# Patient Record
Sex: FEMALE | Race: WHITE | NOT HISPANIC OR LATINO | Employment: OTHER | URBAN - METROPOLITAN AREA
[De-identification: names, ages, dates, MRNs, and addresses within clinical notes are randomized per-mention and may not be internally consistent; named-entity substitution may affect disease eponyms.]

---

## 2017-01-01 ENCOUNTER — APPOINTMENT (OUTPATIENT)
Dept: GENERAL RADIOLOGY | Facility: HOSPITAL | Age: 62
End: 2017-01-01
Attending: INTERNAL MEDICINE

## 2017-01-01 ENCOUNTER — HOSPITAL ENCOUNTER (INPATIENT)
Facility: HOSPITAL | Age: 62
LOS: 14 days | Discharge: HOME HEALTH | End: 2017-04-20
Attending: EMERGENCY MEDICINE | Admitting: INTERNAL MEDICINE

## 2017-01-01 ENCOUNTER — TELEPHONE (OUTPATIENT)
Dept: FAMILY MEDICINE CLINIC | Facility: CLINIC | Age: 62
End: 2017-01-01

## 2017-01-01 ENCOUNTER — APPOINTMENT (OUTPATIENT)
Dept: CT IMAGING | Facility: HOSPITAL | Age: 62
End: 2017-01-01

## 2017-01-01 ENCOUNTER — OFFICE VISIT (OUTPATIENT)
Dept: FAMILY MEDICINE CLINIC | Facility: CLINIC | Age: 62
End: 2017-01-01

## 2017-01-01 ENCOUNTER — HOSPITAL ENCOUNTER (INPATIENT)
Facility: HOSPITAL | Age: 62
LOS: 1 days | End: 2017-08-17
Attending: INTERNAL MEDICINE | Admitting: INTERNAL MEDICINE

## 2017-01-01 ENCOUNTER — HOSPITAL ENCOUNTER (INPATIENT)
Facility: HOSPITAL | Age: 62
LOS: 5 days | Discharge: HOSPICE | End: 2017-08-16
Attending: EMERGENCY MEDICINE | Admitting: HOSPITALIST

## 2017-01-01 ENCOUNTER — APPOINTMENT (OUTPATIENT)
Dept: GENERAL RADIOLOGY | Facility: HOSPITAL | Age: 62
End: 2017-01-01

## 2017-01-01 ENCOUNTER — APPOINTMENT (OUTPATIENT)
Dept: CARDIOLOGY | Facility: HOSPITAL | Age: 62
End: 2017-01-01

## 2017-01-01 VITALS
SYSTOLIC BLOOD PRESSURE: 104 MMHG | RESPIRATION RATE: 16 BRPM | DIASTOLIC BLOOD PRESSURE: 72 MMHG | HEART RATE: 94 BPM | HEIGHT: 66 IN | TEMPERATURE: 100.9 F | OXYGEN SATURATION: 92 % | BODY MASS INDEX: 26.65 KG/M2 | WEIGHT: 165.8 LBS

## 2017-01-01 VITALS
WEIGHT: 205 LBS | DIASTOLIC BLOOD PRESSURE: 86 MMHG | HEART RATE: 111 BPM | OXYGEN SATURATION: 96 % | HEIGHT: 60 IN | SYSTOLIC BLOOD PRESSURE: 128 MMHG | BODY MASS INDEX: 40.25 KG/M2

## 2017-01-01 VITALS
SYSTOLIC BLOOD PRESSURE: 78 MMHG | RESPIRATION RATE: 28 BRPM | HEART RATE: 154 BPM | TEMPERATURE: 105.4 F | DIASTOLIC BLOOD PRESSURE: 50 MMHG | OXYGEN SATURATION: 67 %

## 2017-01-01 VITALS
RESPIRATION RATE: 16 BRPM | WEIGHT: 201.7 LBS | HEIGHT: 60 IN | SYSTOLIC BLOOD PRESSURE: 106 MMHG | HEART RATE: 79 BPM | OXYGEN SATURATION: 92 % | TEMPERATURE: 98 F | DIASTOLIC BLOOD PRESSURE: 68 MMHG | BODY MASS INDEX: 39.6 KG/M2

## 2017-01-01 VITALS
SYSTOLIC BLOOD PRESSURE: 124 MMHG | HEART RATE: 103 BPM | HEIGHT: 63 IN | WEIGHT: 227 LBS | BODY MASS INDEX: 40.22 KG/M2 | TEMPERATURE: 98.9 F | OXYGEN SATURATION: 94 % | DIASTOLIC BLOOD PRESSURE: 90 MMHG

## 2017-01-01 DIAGNOSIS — J96.01 ACUTE RESPIRATORY FAILURE WITH HYPOXIA (HCC): Primary | ICD-10-CM

## 2017-01-01 DIAGNOSIS — J96.21 ACUTE ON CHRONIC RESPIRATORY FAILURE WITH HYPOXIA (HCC): ICD-10-CM

## 2017-01-01 DIAGNOSIS — R41.82 ALTERED MENTAL STATUS, UNSPECIFIED ALTERED MENTAL STATUS TYPE: Primary | ICD-10-CM

## 2017-01-01 DIAGNOSIS — J18.8 OTHER PNEUMONIA, UNSPECIFIED ORGANISM: ICD-10-CM

## 2017-01-01 DIAGNOSIS — J44.1 COPD EXACERBATION (HCC): ICD-10-CM

## 2017-01-01 DIAGNOSIS — N39.0 URINARY TRACT INFECTION WITHOUT HEMATURIA, SITE UNSPECIFIED: ICD-10-CM

## 2017-01-01 DIAGNOSIS — G93.41 METABOLIC ENCEPHALOPATHY: ICD-10-CM

## 2017-01-01 DIAGNOSIS — J43.9 PULMONARY EMPHYSEMA, UNSPECIFIED EMPHYSEMA TYPE (HCC): ICD-10-CM

## 2017-01-01 DIAGNOSIS — F17.200 SMOKING: ICD-10-CM

## 2017-01-01 DIAGNOSIS — R09.02 HYPOXIA: ICD-10-CM

## 2017-01-01 DIAGNOSIS — R09.02 HYPOXEMIA: ICD-10-CM

## 2017-01-01 DIAGNOSIS — A41.9 SEPSIS, DUE TO UNSPECIFIED ORGANISM: Primary | ICD-10-CM

## 2017-01-01 DIAGNOSIS — R13.10 SWALLOWING IMPAIRED: ICD-10-CM

## 2017-01-01 DIAGNOSIS — A41.9 SEPSIS, DUE TO UNSPECIFIED ORGANISM: ICD-10-CM

## 2017-01-01 DIAGNOSIS — R13.10 DYSPHAGIA, UNSPECIFIED TYPE: ICD-10-CM

## 2017-01-01 DIAGNOSIS — R50.9 FEVER, UNSPECIFIED FEVER CAUSE: ICD-10-CM

## 2017-01-01 DIAGNOSIS — C79.9 METASTATIC DISEASE (HCC): ICD-10-CM

## 2017-01-01 DIAGNOSIS — L30.9 DERMATITIS: Primary | ICD-10-CM

## 2017-01-01 LAB
ALBUMIN SERPL-MCNC: 2 G/DL (ref 2.9–4.4)
ALBUMIN SERPL-MCNC: 2.9 G/DL (ref 3.5–5.2)
ALBUMIN SERPL-MCNC: 3.2 G/DL (ref 3.5–5.2)
ALBUMIN SERPL-MCNC: 3.6 G/DL (ref 3.5–5.2)
ALBUMIN/GLOB SERPL: 0.5 {RATIO} (ref 0.7–1.7)
ALBUMIN/GLOB SERPL: 0.6 G/DL
ALBUMIN/GLOB SERPL: 0.9 G/DL
ALBUMIN/GLOB SERPL: 0.9 G/DL
ALP SERPL-CCNC: 109 U/L (ref 39–117)
ALP SERPL-CCNC: 131 U/L (ref 39–117)
ALP SERPL-CCNC: 198 U/L (ref 39–117)
ALPHA1 GLOB FLD ELPH-MCNC: 0.3 G/DL (ref 0–0.4)
ALPHA2 GLOB SERPL ELPH-MCNC: 1.2 G/DL (ref 0.4–1)
ALT SERPL W P-5'-P-CCNC: 17 U/L (ref 1–33)
ALT SERPL W P-5'-P-CCNC: 25 U/L (ref 1–33)
ALT SERPL W P-5'-P-CCNC: 5 U/L (ref 1–33)
AMMONIA BLD-SCNC: 32 UMOL/L (ref 11–51)
AMPHET+METHAMPHET UR QL: NEGATIVE
ANION GAP SERPL CALCULATED.3IONS-SCNC: 10.7 MMOL/L
ANION GAP SERPL CALCULATED.3IONS-SCNC: 11.5 MMOL/L
ANION GAP SERPL CALCULATED.3IONS-SCNC: 11.6 MMOL/L
ANION GAP SERPL CALCULATED.3IONS-SCNC: 12.5 MMOL/L
ANION GAP SERPL CALCULATED.3IONS-SCNC: 14.1 MMOL/L
ANION GAP SERPL CALCULATED.3IONS-SCNC: 15.1 MMOL/L
ANION GAP SERPL CALCULATED.3IONS-SCNC: 16.1 MMOL/L
ANISOCYTOSIS BLD QL: NORMAL
ARTERIAL PATENCY WRIST A: ABNORMAL
ARTERIAL PATENCY WRIST A: POSITIVE
AST SERPL-CCNC: 18 U/L (ref 1–32)
AST SERPL-CCNC: 27 U/L (ref 1–32)
AST SERPL-CCNC: 45 U/L (ref 1–32)
ATMOSPHERIC PRESS: 745.5 MMHG
ATMOSPHERIC PRESS: 746.4 MMHG
ATMOSPHERIC PRESS: 749.3 MMHG
ATMOSPHERIC PRESS: 751.3 MMHG
ATMOSPHERIC PRESS: 751.8 MMHG
B-GLOBULIN SERPL ELPH-MCNC: 1.2 G/DL (ref 0.7–1.3)
BACTERIA SPEC AEROBE CULT: ABNORMAL
BACTERIA SPEC AEROBE CULT: NO GROWTH
BACTERIA SPEC AEROBE CULT: NORMAL
BACTERIA UR QL AUTO: ABNORMAL /HPF
BACTERIA UR QL AUTO: ABNORMAL /HPF
BARBITURATES UR QL SCN: NEGATIVE
BASE EXCESS BLDA CALC-SCNC: 10.2 MMOL/L (ref 0–2)
BASE EXCESS BLDA CALC-SCNC: 2.4 MMOL/L (ref 0–2)
BASE EXCESS BLDA CALC-SCNC: 6.1 MMOL/L (ref 0–2)
BASE EXCESS BLDA CALC-SCNC: 7.2 MMOL/L (ref 0–2)
BASE EXCESS BLDA CALC-SCNC: 7.6 MMOL/L (ref 0–2)
BASOPHILS # BLD AUTO: 0 10*3/MM3 (ref 0–0.2)
BASOPHILS # BLD AUTO: 0.01 10*3/MM3 (ref 0–0.2)
BASOPHILS # BLD AUTO: 0.02 10*3/MM3 (ref 0–0.2)
BASOPHILS # BLD AUTO: 0.05 10*3/MM3 (ref 0–0.2)
BASOPHILS # BLD AUTO: 0.09 10*3/MM3 (ref 0–0.2)
BASOPHILS NFR BLD AUTO: 0 % (ref 0–1.5)
BASOPHILS NFR BLD AUTO: 0.1 % (ref 0–1.5)
BASOPHILS NFR BLD AUTO: 0.3 % (ref 0–1.5)
BASOPHILS NFR BLD AUTO: 0.5 % (ref 0–1.5)
BASOPHILS NFR BLD AUTO: 0.9 % (ref 0–1.5)
BDY SITE: ABNORMAL
BENZODIAZ UR QL SCN: NEGATIVE
BH CV LOWER VASCULAR LEFT COMMON FEMORAL AUGMENT: NORMAL
BH CV LOWER VASCULAR LEFT COMMON FEMORAL COMPETENT: NORMAL
BH CV LOWER VASCULAR LEFT COMMON FEMORAL COMPRESS: NORMAL
BH CV LOWER VASCULAR LEFT COMMON FEMORAL PHASIC: NORMAL
BH CV LOWER VASCULAR LEFT COMMON FEMORAL SPONT: NORMAL
BH CV LOWER VASCULAR LEFT DISTAL FEMORAL COMPRESS: NORMAL
BH CV LOWER VASCULAR LEFT GASTRONEMIUS COMPRESS: NORMAL
BH CV LOWER VASCULAR LEFT GREATER SAPH AK COMPRESS: NORMAL
BH CV LOWER VASCULAR LEFT GREATER SAPH BK COMPRESS: NORMAL
BH CV LOWER VASCULAR LEFT MID FEMORAL AUGMENT: NORMAL
BH CV LOWER VASCULAR LEFT MID FEMORAL COMPETENT: NORMAL
BH CV LOWER VASCULAR LEFT MID FEMORAL COMPRESS: NORMAL
BH CV LOWER VASCULAR LEFT MID FEMORAL PHASIC: NORMAL
BH CV LOWER VASCULAR LEFT MID FEMORAL SPONT: NORMAL
BH CV LOWER VASCULAR LEFT PERONEAL COMPRESS: NORMAL
BH CV LOWER VASCULAR LEFT POPLITEAL AUGMENT: NORMAL
BH CV LOWER VASCULAR LEFT POPLITEAL COMPETENT: NORMAL
BH CV LOWER VASCULAR LEFT POPLITEAL COMPRESS: NORMAL
BH CV LOWER VASCULAR LEFT POPLITEAL PHASIC: NORMAL
BH CV LOWER VASCULAR LEFT POPLITEAL SPONT: NORMAL
BH CV LOWER VASCULAR LEFT POSTERIOR TIBIAL COMPRESS: NORMAL
BH CV LOWER VASCULAR LEFT PROXIMAL FEMORAL COMPRESS: NORMAL
BH CV LOWER VASCULAR LEFT SAPHENOFEMORAL JUNCTION AUGMENT: NORMAL
BH CV LOWER VASCULAR LEFT SAPHENOFEMORAL JUNCTION COMPETENT: NORMAL
BH CV LOWER VASCULAR LEFT SAPHENOFEMORAL JUNCTION COMPRESS: NORMAL
BH CV LOWER VASCULAR LEFT SAPHENOFEMORAL JUNCTION PHASIC: NORMAL
BH CV LOWER VASCULAR LEFT SAPHENOFEMORAL JUNCTION SPONT: NORMAL
BH CV LOWER VASCULAR RIGHT COMMON FEMORAL AUGMENT: NORMAL
BH CV LOWER VASCULAR RIGHT COMMON FEMORAL COMPETENT: NORMAL
BH CV LOWER VASCULAR RIGHT COMMON FEMORAL COMPRESS: NORMAL
BH CV LOWER VASCULAR RIGHT COMMON FEMORAL PHASIC: NORMAL
BH CV LOWER VASCULAR RIGHT COMMON FEMORAL SPONT: NORMAL
BH CV LOWER VASCULAR RIGHT DISTAL FEMORAL COMPRESS: NORMAL
BH CV LOWER VASCULAR RIGHT GASTRONEMIUS COMPRESS: NORMAL
BH CV LOWER VASCULAR RIGHT GREATER SAPH AK COMPRESS: NORMAL
BH CV LOWER VASCULAR RIGHT GREATER SAPH BK COMPRESS: NORMAL
BH CV LOWER VASCULAR RIGHT MID FEMORAL AUGMENT: NORMAL
BH CV LOWER VASCULAR RIGHT MID FEMORAL COMPETENT: NORMAL
BH CV LOWER VASCULAR RIGHT MID FEMORAL COMPRESS: NORMAL
BH CV LOWER VASCULAR RIGHT MID FEMORAL PHASIC: NORMAL
BH CV LOWER VASCULAR RIGHT MID FEMORAL SPONT: NORMAL
BH CV LOWER VASCULAR RIGHT PERONEAL COMPRESS: NORMAL
BH CV LOWER VASCULAR RIGHT POPLITEAL AUGMENT: NORMAL
BH CV LOWER VASCULAR RIGHT POPLITEAL COMPETENT: NORMAL
BH CV LOWER VASCULAR RIGHT POPLITEAL COMPRESS: NORMAL
BH CV LOWER VASCULAR RIGHT POPLITEAL PHASIC: NORMAL
BH CV LOWER VASCULAR RIGHT POPLITEAL SPONT: NORMAL
BH CV LOWER VASCULAR RIGHT POSTERIOR TIBIAL COMPRESS: NORMAL
BH CV LOWER VASCULAR RIGHT PROXIMAL FEMORAL COMPRESS: NORMAL
BH CV LOWER VASCULAR RIGHT SAPHENOFEMORAL JUNCTION AUGMENT: NORMAL
BH CV LOWER VASCULAR RIGHT SAPHENOFEMORAL JUNCTION COMPETENT: NORMAL
BH CV LOWER VASCULAR RIGHT SAPHENOFEMORAL JUNCTION COMPRESS: NORMAL
BH CV LOWER VASCULAR RIGHT SAPHENOFEMORAL JUNCTION PHASIC: NORMAL
BH CV LOWER VASCULAR RIGHT SAPHENOFEMORAL JUNCTION SPONT: NORMAL
BILIRUB SERPL-MCNC: 0.2 MG/DL (ref 0.1–1.2)
BILIRUB SERPL-MCNC: 0.3 MG/DL (ref 0.1–1.2)
BILIRUB SERPL-MCNC: 0.4 MG/DL (ref 0.1–1.2)
BILIRUB UR QL STRIP: NEGATIVE
BUN BLD-MCNC: 10 MG/DL (ref 6–20)
BUN BLD-MCNC: 12 MG/DL (ref 6–20)
BUN BLD-MCNC: 12 MG/DL (ref 6–20)
BUN BLD-MCNC: 21 MG/DL (ref 8–23)
BUN BLD-MCNC: 26 MG/DL (ref 8–23)
BUN BLD-MCNC: 27 MG/DL (ref 8–23)
BUN BLD-MCNC: 9 MG/DL (ref 6–20)
BUN/CREAT SERPL: 13 (ref 7–25)
BUN/CREAT SERPL: 16.7 (ref 7–25)
BUN/CREAT SERPL: 18 (ref 7–25)
BUN/CREAT SERPL: 24 (ref 7–25)
BUN/CREAT SERPL: 28.8 (ref 7–25)
BUN/CREAT SERPL: 42.9 (ref 7–25)
BUN/CREAT SERPL: 53.1 (ref 7–25)
CALCIUM SPEC-SCNC: 7.8 MG/DL (ref 8.6–10.5)
CALCIUM SPEC-SCNC: 8.6 MG/DL (ref 8.6–10.5)
CALCIUM SPEC-SCNC: 8.7 MG/DL (ref 8.6–10.5)
CALCIUM SPEC-SCNC: 8.8 MG/DL (ref 8.6–10.5)
CALCIUM SPEC-SCNC: 8.9 MG/DL (ref 8.6–10.5)
CALCIUM SPEC-SCNC: 9 MG/DL (ref 8.6–10.5)
CALCIUM SPEC-SCNC: 9.3 MG/DL (ref 8.6–10.5)
CANNABINOIDS SERPL QL: NEGATIVE
CHLORIDE SERPL-SCNC: 100 MMOL/L (ref 98–107)
CHLORIDE SERPL-SCNC: 102 MMOL/L (ref 98–107)
CHLORIDE SERPL-SCNC: 109 MMOL/L (ref 98–107)
CHLORIDE SERPL-SCNC: 96 MMOL/L (ref 98–107)
CHLORIDE SERPL-SCNC: 98 MMOL/L (ref 98–107)
CLARITY UR: ABNORMAL
CLARITY UR: CLEAR
CO2 SERPL-SCNC: 25.5 MMOL/L (ref 22–29)
CO2 SERPL-SCNC: 26.9 MMOL/L (ref 22–29)
CO2 SERPL-SCNC: 28.9 MMOL/L (ref 22–29)
CO2 SERPL-SCNC: 29.9 MMOL/L (ref 22–29)
CO2 SERPL-SCNC: 30.4 MMOL/L (ref 22–29)
CO2 SERPL-SCNC: 31.3 MMOL/L (ref 22–29)
CO2 SERPL-SCNC: 31.5 MMOL/L (ref 22–29)
COCAINE UR QL: NEGATIVE
COLOR UR: ABNORMAL
CREAT BLD-MCNC: 0.49 MG/DL (ref 0.57–1)
CREAT BLD-MCNC: 0.5 MG/DL (ref 0.57–1)
CREAT BLD-MCNC: 0.5 MG/DL (ref 0.57–1)
CREAT BLD-MCNC: 0.63 MG/DL (ref 0.57–1)
CREAT BLD-MCNC: 0.72 MG/DL (ref 0.57–1)
CREAT BLD-MCNC: 0.73 MG/DL (ref 0.57–1)
CREAT BLD-MCNC: 0.77 MG/DL (ref 0.57–1)
D DIMER PPP FEU-MCNC: 3.45 MCGFEU/ML (ref 0–0.49)
D-LACTATE SERPL-SCNC: 0.7 MMOL/L (ref 0.5–2)
D-LACTATE SERPL-SCNC: 1 MMOL/L (ref 0.5–2)
D-LACTATE SERPL-SCNC: 1.6 MMOL/L (ref 0.5–2)
DEPRECATED RDW RBC AUTO: 48.9 FL (ref 37–54)
DEPRECATED RDW RBC AUTO: 53.3 FL (ref 37–54)
DEPRECATED RDW RBC AUTO: 53.7 FL (ref 37–54)
DEPRECATED RDW RBC AUTO: 55.5 FL (ref 37–54)
DEPRECATED RDW RBC AUTO: 58.3 FL (ref 37–54)
DEPRECATED RDW RBC AUTO: 65.3 FL (ref 37–54)
DEPRECATED RDW RBC AUTO: 66.1 FL (ref 37–54)
DEPRECATED RDW RBC AUTO: 67.7 FL (ref 37–54)
EOSINOPHIL # BLD AUTO: 0 10*3/MM3 (ref 0–0.7)
EOSINOPHIL # BLD AUTO: 0.01 10*3/MM3 (ref 0–0.7)
EOSINOPHIL # BLD AUTO: 0.02 10*3/MM3 (ref 0–0.7)
EOSINOPHIL # BLD AUTO: 0.15 10*3/MM3 (ref 0–0.7)
EOSINOPHIL # BLD AUTO: 0.31 10*3/MM3 (ref 0–0.7)
EOSINOPHIL # BLD MANUAL: 0.1 10*3/MM3 (ref 0–0.7)
EOSINOPHIL NFR BLD AUTO: 0 % (ref 0.3–6.2)
EOSINOPHIL NFR BLD AUTO: 0.1 % (ref 0.3–6.2)
EOSINOPHIL NFR BLD AUTO: 0.3 % (ref 0.3–6.2)
EOSINOPHIL NFR BLD AUTO: 1.4 % (ref 0.3–6.2)
EOSINOPHIL NFR BLD AUTO: 3 % (ref 0.3–6.2)
EOSINOPHIL NFR BLD MANUAL: 1 % (ref 0.3–6.2)
ERYTHROCYTE [DISTWIDTH] IN BLOOD BY AUTOMATED COUNT: 15.9 % (ref 11.7–13)
ERYTHROCYTE [DISTWIDTH] IN BLOOD BY AUTOMATED COUNT: 16.8 % (ref 11.7–13)
ERYTHROCYTE [DISTWIDTH] IN BLOOD BY AUTOMATED COUNT: 17 % (ref 11.7–13)
ERYTHROCYTE [DISTWIDTH] IN BLOOD BY AUTOMATED COUNT: 17.6 % (ref 11.7–13)
ERYTHROCYTE [DISTWIDTH] IN BLOOD BY AUTOMATED COUNT: 18.1 % (ref 11.7–13)
ERYTHROCYTE [DISTWIDTH] IN BLOOD BY AUTOMATED COUNT: 19.9 % (ref 11.7–13)
ERYTHROCYTE [DISTWIDTH] IN BLOOD BY AUTOMATED COUNT: 20.2 % (ref 11.7–13)
ERYTHROCYTE [DISTWIDTH] IN BLOOD BY AUTOMATED COUNT: 20.2 % (ref 11.7–13)
FERRITIN SERPL-MCNC: 402.9 NG/ML (ref 13–150)
FLUAV AG NPH QL: NEGATIVE
FLUBV AG NPH QL IA: POSITIVE
GAMMA GLOB SERPL ELPH-MCNC: 1.7 G/DL (ref 0.4–1.8)
GAS FLOW AIRWAY: 15 LPM
GAS FLOW AIRWAY: 2 LPM
GAS FLOW AIRWAY: 3.5 LPM
GAS FLOW AIRWAY: 7 LPM
GFR SERPL CREATININE-BSD FRML MDRD: 126 ML/MIN/1.73
GFR SERPL CREATININE-BSD FRML MDRD: 126 ML/MIN/1.73
GFR SERPL CREATININE-BSD FRML MDRD: 129 ML/MIN/1.73
GFR SERPL CREATININE-BSD FRML MDRD: 77 ML/MIN/1.73
GFR SERPL CREATININE-BSD FRML MDRD: 81 ML/MIN/1.73
GFR SERPL CREATININE-BSD FRML MDRD: 83 ML/MIN/1.73
GFR SERPL CREATININE-BSD FRML MDRD: 96 ML/MIN/1.73
GLOBULIN SER CALC-MCNC: 4.4 G/DL (ref 2.2–3.9)
GLOBULIN UR ELPH-MCNC: 3.5 GM/DL
GLOBULIN UR ELPH-MCNC: 4 GM/DL
GLOBULIN UR ELPH-MCNC: 5 GM/DL
GLUCOSE BLD-MCNC: 116 MG/DL (ref 65–99)
GLUCOSE BLD-MCNC: 117 MG/DL (ref 65–99)
GLUCOSE BLD-MCNC: 140 MG/DL (ref 65–99)
GLUCOSE BLD-MCNC: 85 MG/DL (ref 65–99)
GLUCOSE BLD-MCNC: 87 MG/DL (ref 65–99)
GLUCOSE BLD-MCNC: 97 MG/DL (ref 65–99)
GLUCOSE BLD-MCNC: 98 MG/DL (ref 65–99)
GLUCOSE BLDC GLUCOMTR-MCNC: 100 MG/DL (ref 70–130)
GLUCOSE BLDC GLUCOMTR-MCNC: 101 MG/DL (ref 70–130)
GLUCOSE BLDC GLUCOMTR-MCNC: 105 MG/DL (ref 70–130)
GLUCOSE BLDC GLUCOMTR-MCNC: 107 MG/DL (ref 70–130)
GLUCOSE BLDC GLUCOMTR-MCNC: 109 MG/DL (ref 70–130)
GLUCOSE BLDC GLUCOMTR-MCNC: 110 MG/DL (ref 70–130)
GLUCOSE BLDC GLUCOMTR-MCNC: 115 MG/DL (ref 70–130)
GLUCOSE BLDC GLUCOMTR-MCNC: 116 MG/DL (ref 70–130)
GLUCOSE BLDC GLUCOMTR-MCNC: 120 MG/DL (ref 70–130)
GLUCOSE BLDC GLUCOMTR-MCNC: 123 MG/DL (ref 70–130)
GLUCOSE BLDC GLUCOMTR-MCNC: 79 MG/DL (ref 70–130)
GLUCOSE BLDC GLUCOMTR-MCNC: 84 MG/DL (ref 70–130)
GLUCOSE BLDC GLUCOMTR-MCNC: 85 MG/DL (ref 70–130)
GLUCOSE BLDC GLUCOMTR-MCNC: 87 MG/DL (ref 70–130)
GLUCOSE BLDC GLUCOMTR-MCNC: 89 MG/DL (ref 70–130)
GLUCOSE BLDC GLUCOMTR-MCNC: 90 MG/DL (ref 70–130)
GLUCOSE BLDC GLUCOMTR-MCNC: 92 MG/DL (ref 70–130)
GLUCOSE BLDC GLUCOMTR-MCNC: 92 MG/DL (ref 70–130)
GLUCOSE BLDC GLUCOMTR-MCNC: 93 MG/DL (ref 70–130)
GLUCOSE BLDC GLUCOMTR-MCNC: 94 MG/DL (ref 70–130)
GLUCOSE BLDC GLUCOMTR-MCNC: 95 MG/DL (ref 70–130)
GLUCOSE BLDC GLUCOMTR-MCNC: 95 MG/DL (ref 70–130)
GLUCOSE UR STRIP-MCNC: NEGATIVE MG/DL
HCO3 BLDA-SCNC: 27.4 MMOL/L (ref 22–28)
HCO3 BLDA-SCNC: 30.1 MMOL/L (ref 22–28)
HCO3 BLDA-SCNC: 32.3 MMOL/L (ref 22–28)
HCO3 BLDA-SCNC: 32.9 MMOL/L (ref 22–28)
HCO3 BLDA-SCNC: 35.3 MMOL/L (ref 22–28)
HCT VFR BLD AUTO: 26.8 % (ref 35.6–45.5)
HCT VFR BLD AUTO: 27.4 % (ref 35.6–45.5)
HCT VFR BLD AUTO: 29.7 % (ref 35.6–45.5)
HCT VFR BLD AUTO: 32.8 % (ref 35.6–45.5)
HCT VFR BLD AUTO: 33.3 % (ref 35.6–45.5)
HCT VFR BLD AUTO: 33.5 % (ref 35.6–45.5)
HCT VFR BLD AUTO: 34.4 % (ref 35.6–45.5)
HCT VFR BLD AUTO: 36.6 % (ref 35.6–45.5)
HGB BLD-MCNC: 10.3 G/DL (ref 11.9–15.5)
HGB BLD-MCNC: 10.4 G/DL (ref 11.9–15.5)
HGB BLD-MCNC: 10.7 G/DL (ref 11.9–15.5)
HGB BLD-MCNC: 10.9 G/DL (ref 11.9–15.5)
HGB BLD-MCNC: 11.5 G/DL (ref 11.9–15.5)
HGB BLD-MCNC: 7.5 G/DL (ref 11.9–15.5)
HGB BLD-MCNC: 7.6 G/DL (ref 11.9–15.5)
HGB BLD-MCNC: 8.9 G/DL (ref 11.9–15.5)
HGB UR QL STRIP.AUTO: NEGATIVE
HOLD SPECIMEN: NORMAL
HOLD SPECIMEN: NORMAL
HYALINE CASTS UR QL AUTO: ABNORMAL /LPF
HYALINE CASTS UR QL AUTO: ABNORMAL /LPF
HYPOCHROMIA BLD QL: ABNORMAL
HYPOCHROMIA BLD QL: NORMAL
IMM GRANULOCYTES # BLD: 0 10*3/MM3 (ref 0–0.03)
IMM GRANULOCYTES # BLD: 0.03 10*3/MM3 (ref 0–0.03)
IMM GRANULOCYTES # BLD: 0.07 10*3/MM3 (ref 0–0.03)
IMM GRANULOCYTES # BLD: 0.24 10*3/MM3 (ref 0–0.03)
IMM GRANULOCYTES # BLD: 0.3 10*3/MM3 (ref 0–0.03)
IMM GRANULOCYTES NFR BLD: 0 % (ref 0–0.5)
IMM GRANULOCYTES NFR BLD: 0.3 % (ref 0–0.5)
IMM GRANULOCYTES NFR BLD: 1.1 % (ref 0–0.5)
IMM GRANULOCYTES NFR BLD: 2.2 % (ref 0–0.5)
IMM GRANULOCYTES NFR BLD: 2.9 % (ref 0–0.5)
INR PPP: 1.1 (ref 0.9–1.1)
IRON 24H UR-MRATE: 27 MCG/DL (ref 37–145)
IRON SATN MFR SERPL: 10 % (ref 20–50)
KETONES UR QL STRIP: ABNORMAL
KETONES UR QL STRIP: ABNORMAL
KETONES UR QL STRIP: NEGATIVE
KETONES UR QL STRIP: NEGATIVE
LEUKOCYTE ESTERASE UR QL STRIP.AUTO: ABNORMAL
LEUKOCYTE ESTERASE UR QL STRIP.AUTO: ABNORMAL
LEUKOCYTE ESTERASE UR QL STRIP.AUTO: NEGATIVE
LEUKOCYTE ESTERASE UR QL STRIP.AUTO: NEGATIVE
LYMPHOCYTES # BLD AUTO: 1.19 10*3/MM3 (ref 0.9–4.8)
LYMPHOCYTES # BLD AUTO: 1.24 10*3/MM3 (ref 0.9–4.8)
LYMPHOCYTES # BLD AUTO: 1.9 10*3/MM3 (ref 0.9–4.8)
LYMPHOCYTES # BLD AUTO: 2.77 10*3/MM3 (ref 0.9–4.8)
LYMPHOCYTES # BLD AUTO: 2.77 10*3/MM3 (ref 0.9–4.8)
LYMPHOCYTES # BLD MANUAL: 2.08 10*3/MM3 (ref 0.9–4.8)
LYMPHOCYTES NFR BLD AUTO: 14 % (ref 19.6–45.3)
LYMPHOCYTES NFR BLD AUTO: 17.8 % (ref 19.6–45.3)
LYMPHOCYTES NFR BLD AUTO: 25.4 % (ref 19.6–45.3)
LYMPHOCYTES NFR BLD AUTO: 26.8 % (ref 19.6–45.3)
LYMPHOCYTES NFR BLD AUTO: 30.8 % (ref 19.6–45.3)
LYMPHOCYTES NFR BLD MANUAL: 21 % (ref 19.6–45.3)
LYMPHOCYTES NFR BLD MANUAL: 8 % (ref 5–12)
Lab: ABNORMAL
M-SPIKE: ABNORMAL G/DL
MCH RBC QN AUTO: 25.5 PG (ref 26.9–32)
MCH RBC QN AUTO: 25.8 PG (ref 26.9–32)
MCH RBC QN AUTO: 27.1 PG (ref 26.9–32)
MCH RBC QN AUTO: 27.1 PG (ref 26.9–32)
MCH RBC QN AUTO: 27.2 PG (ref 26.9–32)
MCH RBC QN AUTO: 27.4 PG (ref 26.9–32)
MCH RBC QN AUTO: 27.4 PG (ref 26.9–32)
MCH RBC QN AUTO: 27.7 PG (ref 26.9–32)
MCHC RBC AUTO-ENTMCNC: 27.7 G/DL (ref 32.4–36.3)
MCHC RBC AUTO-ENTMCNC: 28 G/DL (ref 32.4–36.3)
MCHC RBC AUTO-ENTMCNC: 30 G/DL (ref 32.4–36.3)
MCHC RBC AUTO-ENTMCNC: 30.7 G/DL (ref 32.4–36.3)
MCHC RBC AUTO-ENTMCNC: 31.2 G/DL (ref 32.4–36.3)
MCHC RBC AUTO-ENTMCNC: 31.4 G/DL (ref 32.4–36.3)
MCHC RBC AUTO-ENTMCNC: 31.7 G/DL (ref 32.4–36.3)
MCHC RBC AUTO-ENTMCNC: 32.6 G/DL (ref 32.4–36.3)
MCV RBC AUTO: 85 FL (ref 80.5–98.2)
MCV RBC AUTO: 86.4 FL (ref 80.5–98.2)
MCV RBC AUTO: 86.7 FL (ref 80.5–98.2)
MCV RBC AUTO: 87.1 FL (ref 80.5–98.2)
MCV RBC AUTO: 88.4 FL (ref 80.5–98.2)
MCV RBC AUTO: 90.3 FL (ref 80.5–98.2)
MCV RBC AUTO: 91.2 FL (ref 80.5–98.2)
MCV RBC AUTO: 92.9 FL (ref 80.5–98.2)
METHADONE UR QL SCN: NEGATIVE
MODALITY: ABNORMAL
MONOCYTES # BLD AUTO: 0.23 10*3/MM3 (ref 0.2–1.2)
MONOCYTES # BLD AUTO: 0.35 10*3/MM3 (ref 0.2–1.2)
MONOCYTES # BLD AUTO: 0.72 10*3/MM3 (ref 0.2–1.2)
MONOCYTES # BLD AUTO: 0.79 10*3/MM3 (ref 0.2–1.2)
MONOCYTES # BLD AUTO: 1.67 10*3/MM3 (ref 0.2–1.2)
MONOCYTES # BLD AUTO: 1.7 10*3/MM3 (ref 0.2–1.2)
MONOCYTES NFR BLD AUTO: 11.7 % (ref 5–12)
MONOCYTES NFR BLD AUTO: 15.3 % (ref 5–12)
MONOCYTES NFR BLD AUTO: 16.5 % (ref 5–12)
MONOCYTES NFR BLD AUTO: 2.6 % (ref 5–12)
MONOCYTES NFR BLD AUTO: 5.2 % (ref 5–12)
MUCOUS THREADS URNS QL MICRO: ABNORMAL /HPF
NEUTROPHILS # BLD AUTO: 3.43 10*3/MM3 (ref 1.9–8.1)
NEUTROPHILS # BLD AUTO: 5.11 10*3/MM3 (ref 1.9–8.1)
NEUTROPHILS # BLD AUTO: 5.16 10*3/MM3 (ref 1.9–8.1)
NEUTROPHILS # BLD AUTO: 6.02 10*3/MM3 (ref 1.9–8.1)
NEUTROPHILS # BLD AUTO: 6.92 10*3/MM3 (ref 1.9–8.1)
NEUTROPHILS # BLD AUTO: 7.35 10*3/MM3 (ref 1.9–8.1)
NEUTROPHILS NFR BLD AUTO: 49.9 % (ref 42.7–76)
NEUTROPHILS NFR BLD AUTO: 55.2 % (ref 42.7–76)
NEUTROPHILS NFR BLD AUTO: 55.8 % (ref 42.7–76)
NEUTROPHILS NFR BLD AUTO: 76.8 % (ref 42.7–76)
NEUTROPHILS NFR BLD AUTO: 83.1 % (ref 42.7–76)
NEUTROPHILS NFR BLD MANUAL: 70 % (ref 42.7–76)
NITRITE UR QL STRIP: NEGATIVE
NITRITE UR QL STRIP: NEGATIVE
NITRITE UR QL STRIP: POSITIVE
NITRITE UR QL STRIP: POSITIVE
NRBC BLD MANUAL-RTO: 0 /100 WBC (ref 0–0)
NRBC BLD MANUAL-RTO: 1.7 /100 WBC (ref 0–0)
NRBC BLD MANUAL-RTO: 2.6 /100 WBC (ref 0–0)
NRBC SPEC MANUAL: 3 /100 WBC (ref 0–0)
NT-PROBNP SERPL-MCNC: 120.9 PG/ML (ref 0–900)
NT-PROBNP SERPL-MCNC: 157.5 PG/ML (ref 5–900)
NT-PROBNP SERPL-MCNC: 71.3 PG/ML (ref 0–900)
OPIATES UR QL: NEGATIVE
OXYCODONE UR QL SCN: NEGATIVE
PCO2 BLDA: 40.5 MM HG (ref 35–45)
PCO2 BLDA: 43 MM HG (ref 35–45)
PCO2 BLDA: 45.2 MM HG (ref 35–45)
PCO2 BLDA: 48.7 MM HG (ref 35–45)
PCO2 BLDA: 49.3 MM HG (ref 35–45)
PH BLDA: 7.41 PH UNITS (ref 7.35–7.45)
PH BLDA: 7.43 PH UNITS (ref 7.35–7.45)
PH BLDA: 7.46 PH UNITS (ref 7.35–7.45)
PH BLDA: 7.47 PH UNITS (ref 7.35–7.45)
PH BLDA: 7.48 PH UNITS (ref 7.35–7.45)
PH UR STRIP.AUTO: 5.5 [PH] (ref 5–8)
PH UR STRIP.AUTO: 5.5 [PH] (ref 5–8)
PH UR STRIP.AUTO: 7 [PH] (ref 5–8)
PH UR STRIP.AUTO: 7 [PH] (ref 5–8)
PLAT MORPH BLD: NORMAL
PLAT MORPH BLD: NORMAL
PLATELET # BLD AUTO: 111 10*3/MM3 (ref 140–500)
PLATELET # BLD AUTO: 113 10*3/MM3 (ref 140–500)
PLATELET # BLD AUTO: 122 10*3/MM3 (ref 140–500)
PLATELET # BLD AUTO: 124 10*3/MM3 (ref 140–500)
PLATELET # BLD AUTO: 138 10*3/MM3 (ref 140–500)
PLATELET # BLD AUTO: 346 10*3/MM3 (ref 140–500)
PLATELET # BLD AUTO: 353 10*3/MM3 (ref 140–500)
PLATELET # BLD AUTO: 359 10*3/MM3 (ref 140–500)
PMV BLD AUTO: 10.3 FL (ref 6–12)
PMV BLD AUTO: 10.3 FL (ref 6–12)
PMV BLD AUTO: 10.5 FL (ref 6–12)
PMV BLD AUTO: 10.6 FL (ref 6–12)
PMV BLD AUTO: 10.7 FL (ref 6–12)
PMV BLD AUTO: 10.9 FL (ref 6–12)
PMV BLD AUTO: 9.8 FL (ref 6–12)
PMV BLD AUTO: 9.8 FL (ref 6–12)
PO2 BLDA: 47 MM HG (ref 80–100)
PO2 BLDA: 54.3 MM HG (ref 80–100)
PO2 BLDA: 57.8 MM HG (ref 80–100)
PO2 BLDA: 59.2 MM HG (ref 80–100)
PO2 BLDA: 60.8 MM HG (ref 80–100)
POLYCHROMASIA BLD QL SMEAR: ABNORMAL
POLYCHROMASIA BLD QL SMEAR: NORMAL
POTASSIUM BLD-SCNC: 2.9 MMOL/L (ref 3.5–5.2)
POTASSIUM BLD-SCNC: 3.1 MMOL/L (ref 3.5–5.2)
POTASSIUM BLD-SCNC: 3.2 MMOL/L (ref 3.5–5.2)
POTASSIUM BLD-SCNC: 3.3 MMOL/L (ref 3.5–5.2)
POTASSIUM BLD-SCNC: 3.4 MMOL/L (ref 3.5–5.2)
POTASSIUM BLD-SCNC: 3.5 MMOL/L (ref 3.5–5.2)
POTASSIUM BLD-SCNC: 3.8 MMOL/L (ref 3.5–5.2)
POTASSIUM BLD-SCNC: 4 MMOL/L (ref 3.5–5.2)
POTASSIUM BLD-SCNC: 4.1 MMOL/L (ref 3.5–5.2)
POTASSIUM BLD-SCNC: 4.5 MMOL/L (ref 3.5–5.2)
PROCALCITONIN SERPL-MCNC: 0.07 NG/ML (ref 0.1–0.25)
PROCALCITONIN SERPL-MCNC: 0.08 NG/ML (ref 0.1–0.25)
PROCALCITONIN SERPL-MCNC: 0.09 NG/ML (ref 0.1–0.25)
PROT PATTERN SERPL ELPH-IMP: ABNORMAL
PROT SERPL-MCNC: 6.4 G/DL (ref 6–8.5)
PROT SERPL-MCNC: 6.7 G/DL (ref 6–8.5)
PROT SERPL-MCNC: 7.6 G/DL (ref 6–8.5)
PROT SERPL-MCNC: 7.9 G/DL (ref 6–8.5)
PROT UR QL STRIP: ABNORMAL
PROT UR QL STRIP: ABNORMAL
PROT UR QL STRIP: NEGATIVE
PROT UR QL STRIP: NEGATIVE
PROTHROMBIN TIME: 13.8 SECONDS (ref 11.7–14.2)
RBC # BLD AUTO: 2.94 10*6/MM3 (ref 3.9–5.2)
RBC # BLD AUTO: 2.95 10*6/MM3 (ref 3.9–5.2)
RBC # BLD AUTO: 3.29 10*6/MM3 (ref 3.9–5.2)
RBC # BLD AUTO: 3.79 10*6/MM3 (ref 3.9–5.2)
RBC # BLD AUTO: 3.84 10*6/MM3 (ref 3.9–5.2)
RBC # BLD AUTO: 3.86 10*6/MM3 (ref 3.9–5.2)
RBC # BLD AUTO: 3.98 10*6/MM3 (ref 3.9–5.2)
RBC # BLD AUTO: 4.2 10*6/MM3 (ref 3.9–5.2)
RBC # UR: ABNORMAL /HPF
RBC # UR: ABNORMAL /HPF
REF LAB TEST METHOD: ABNORMAL
REF LAB TEST METHOD: ABNORMAL
RETICS/RBC NFR AUTO: 4.13 % (ref 0.5–1.5)
SAO2 % BLDCOA: 82.8 % (ref 92–99)
SAO2 % BLDCOA: 89.2 % (ref 92–99)
SAO2 % BLDCOA: 91.2 % (ref 92–99)
SAO2 % BLDCOA: 91.3 % (ref 92–99)
SAO2 % BLDCOA: 91.4 % (ref 92–99)
SCAN SLIDE: NORMAL
SET MECH RESP RATE: 18
SODIUM BLD-SCNC: 139 MMOL/L (ref 136–145)
SODIUM BLD-SCNC: 142 MMOL/L (ref 136–145)
SODIUM BLD-SCNC: 144 MMOL/L (ref 136–145)
SODIUM BLD-SCNC: 146 MMOL/L (ref 136–145)
SODIUM BLD-SCNC: 146 MMOL/L (ref 136–145)
SP GR UR STRIP: 1.02 (ref 1–1.03)
SP GR UR STRIP: 1.02 (ref 1–1.03)
SP GR UR STRIP: 1.03 (ref 1–1.03)
SP GR UR STRIP: >=1.03 (ref 1–1.03)
SQUAMOUS #/AREA URNS HPF: ABNORMAL /HPF
SQUAMOUS #/AREA URNS HPF: ABNORMAL /HPF
STOMATOCYTES BLD QL SMEAR: NORMAL
TIBC SERPL-MCNC: 261 MCG/DL
TOTAL RATE: 16 BREATHS/MINUTE
TOTAL RATE: 16 BREATHS/MINUTE
TOTAL RATE: 21 BREATHS/MINUTE
TOTAL RATE: 38 BREATHS/MINUTE
TRANSFERRIN SERPL-MCNC: 175 MG/DL (ref 200–360)
TROPONIN T SERPL-MCNC: <0.01 NG/ML (ref 0–0.03)
TROPONIN T SERPL-MCNC: <0.01 NG/ML (ref 0–0.03)
TSH SERPL DL<=0.05 MIU/L-ACNC: 0.78 MIU/ML (ref 0.27–4.2)
UROBILINOGEN UR QL STRIP: ABNORMAL
VALPROATE SERPL-MCNC: 40 MCG/ML (ref 50–125)
VENTILATOR MODE: ABNORMAL
WBC MORPH BLD: NORMAL
WBC MORPH BLD: NORMAL
WBC NRBC COR # BLD: 10.33 10*3/MM3 (ref 4.5–10.7)
WBC NRBC COR # BLD: 10.9 10*3/MM3 (ref 4.5–10.7)
WBC NRBC COR # BLD: 5.36 10*3/MM3 (ref 4.5–10.7)
WBC NRBC COR # BLD: 6.12 10*3/MM3 (ref 4.5–10.7)
WBC NRBC COR # BLD: 6.16 10*3/MM3 (ref 4.5–10.7)
WBC NRBC COR # BLD: 6.67 10*3/MM3 (ref 4.5–10.7)
WBC NRBC COR # BLD: 8.85 10*3/MM3 (ref 4.5–10.7)
WBC NRBC COR # BLD: 9.88 10*3/MM3 (ref 4.5–10.7)
WBC UR QL AUTO: ABNORMAL /HPF
WBC UR QL AUTO: ABNORMAL /HPF
WHOLE BLOOD HOLD SPECIMEN: NORMAL
WHOLE BLOOD HOLD SPECIMEN: NORMAL

## 2017-01-01 PROCEDURE — 93010 ELECTROCARDIOGRAM REPORT: CPT | Performed by: INTERNAL MEDICINE

## 2017-01-01 PROCEDURE — 36600 WITHDRAWAL OF ARTERIAL BLOOD: CPT

## 2017-01-01 PROCEDURE — 25810000003 SODIUM CHLORIDE 0.9 % WITH KCL 40 MEQ/L 40-0.9 MEQ/L-% SOLUTION: Performed by: INTERNAL MEDICINE

## 2017-01-01 PROCEDURE — 25010000002 LORAZEPAM PER 2 MG: Performed by: INTERNAL MEDICINE

## 2017-01-01 PROCEDURE — 99285 EMERGENCY DEPT VISIT HI MDM: CPT

## 2017-01-01 PROCEDURE — 93970 EXTREMITY STUDY: CPT

## 2017-01-01 PROCEDURE — 71010 HC CHEST PA OR AP: CPT

## 2017-01-01 PROCEDURE — 80307 DRUG TEST PRSMV CHEM ANLYZR: CPT | Performed by: INTERNAL MEDICINE

## 2017-01-01 PROCEDURE — 94799 UNLISTED PULMONARY SVC/PX: CPT

## 2017-01-01 PROCEDURE — 81003 URINALYSIS AUTO W/O SCOPE: CPT | Performed by: INTERNAL MEDICINE

## 2017-01-01 PROCEDURE — 81003 URINALYSIS AUTO W/O SCOPE: CPT | Performed by: NURSE PRACTITIONER

## 2017-01-01 PROCEDURE — 25010000002 PIPERACILLIN SOD-TAZOBACTAM PER 1 G: Performed by: HOSPITALIST

## 2017-01-01 PROCEDURE — 25010000002 HYDROMORPHONE PER 4 MG: Performed by: INTERNAL MEDICINE

## 2017-01-01 PROCEDURE — 25010000002 ENOXAPARIN PER 10 MG: Performed by: INTERNAL MEDICINE

## 2017-01-01 PROCEDURE — 82962 GLUCOSE BLOOD TEST: CPT

## 2017-01-01 PROCEDURE — 85025 COMPLETE CBC W/AUTO DIFF WBC: CPT | Performed by: EMERGENCY MEDICINE

## 2017-01-01 PROCEDURE — 93005 ELECTROCARDIOGRAM TRACING: CPT | Performed by: EMERGENCY MEDICINE

## 2017-01-01 PROCEDURE — 99253 IP/OBS CNSLTJ NEW/EST LOW 45: CPT | Performed by: INTERNAL MEDICINE

## 2017-01-01 PROCEDURE — 97110 THERAPEUTIC EXERCISES: CPT

## 2017-01-01 PROCEDURE — 82803 BLOOD GASES ANY COMBINATION: CPT

## 2017-01-01 PROCEDURE — 84155 ASSAY OF PROTEIN SERUM: CPT | Performed by: INTERNAL MEDICINE

## 2017-01-01 PROCEDURE — 81003 URINALYSIS AUTO W/O SCOPE: CPT | Performed by: EMERGENCY MEDICINE

## 2017-01-01 PROCEDURE — 25010000002 FUROSEMIDE PER 20 MG: Performed by: NURSE PRACTITIONER

## 2017-01-01 PROCEDURE — 80053 COMPREHEN METABOLIC PANEL: CPT | Performed by: EMERGENCY MEDICINE

## 2017-01-01 PROCEDURE — 84145 PROCALCITONIN (PCT): CPT | Performed by: EMERGENCY MEDICINE

## 2017-01-01 PROCEDURE — 83605 ASSAY OF LACTIC ACID: CPT | Performed by: NURSE PRACTITIONER

## 2017-01-01 PROCEDURE — 25010000002 LEVOFLOXACIN PER 250 MG: Performed by: EMERGENCY MEDICINE

## 2017-01-01 PROCEDURE — 80053 COMPREHEN METABOLIC PANEL: CPT | Performed by: INTERNAL MEDICINE

## 2017-01-01 PROCEDURE — 99213 OFFICE O/P EST LOW 20 MIN: CPT | Performed by: NURSE PRACTITIONER

## 2017-01-01 PROCEDURE — 85025 COMPLETE CBC W/AUTO DIFF WBC: CPT | Performed by: NURSE PRACTITIONER

## 2017-01-01 PROCEDURE — 25010000003 POTASSIUM CHLORIDE 10 MEQ/100ML SOLUTION: Performed by: NURSE PRACTITIONER

## 2017-01-01 PROCEDURE — 83880 ASSAY OF NATRIURETIC PEPTIDE: CPT | Performed by: EMERGENCY MEDICINE

## 2017-01-01 PROCEDURE — 25010000002 METHYLPREDNISOLONE PER 125 MG: Performed by: EMERGENCY MEDICINE

## 2017-01-01 PROCEDURE — 25010000002 ENOXAPARIN PER 10 MG: Performed by: HOSPITALIST

## 2017-01-01 PROCEDURE — 87186 SC STD MICRODIL/AGAR DIL: CPT | Performed by: EMERGENCY MEDICINE

## 2017-01-01 PROCEDURE — 87040 BLOOD CULTURE FOR BACTERIA: CPT | Performed by: NURSE PRACTITIONER

## 2017-01-01 PROCEDURE — 81001 URINALYSIS AUTO W/SCOPE: CPT | Performed by: EMERGENCY MEDICINE

## 2017-01-01 PROCEDURE — 82728 ASSAY OF FERRITIN: CPT | Performed by: INTERNAL MEDICINE

## 2017-01-01 PROCEDURE — 84466 ASSAY OF TRANSFERRIN: CPT | Performed by: INTERNAL MEDICINE

## 2017-01-01 PROCEDURE — 97530 THERAPEUTIC ACTIVITIES: CPT

## 2017-01-01 PROCEDURE — 70450 CT HEAD/BRAIN W/O DYE: CPT

## 2017-01-01 PROCEDURE — 72125 CT NECK SPINE W/O DYE: CPT

## 2017-01-01 PROCEDURE — 80048 BASIC METABOLIC PNL TOTAL CA: CPT | Performed by: INTERNAL MEDICINE

## 2017-01-01 PROCEDURE — 85379 FIBRIN DEGRADATION QUANT: CPT | Performed by: INTERNAL MEDICINE

## 2017-01-01 PROCEDURE — 83880 ASSAY OF NATRIURETIC PEPTIDE: CPT | Performed by: INTERNAL MEDICINE

## 2017-01-01 PROCEDURE — 84132 ASSAY OF SERUM POTASSIUM: CPT | Performed by: INTERNAL MEDICINE

## 2017-01-01 PROCEDURE — 85045 AUTOMATED RETICULOCYTE COUNT: CPT | Performed by: INTERNAL MEDICINE

## 2017-01-01 PROCEDURE — 25010000002 FUROSEMIDE PER 20 MG: Performed by: INTERNAL MEDICINE

## 2017-01-01 PROCEDURE — 36415 COLL VENOUS BLD VENIPUNCTURE: CPT | Performed by: INTERNAL MEDICINE

## 2017-01-01 PROCEDURE — 25010000002 VANCOMYCIN 10 G RECONSTITUTED SOLUTION: Performed by: HOSPITALIST

## 2017-01-01 PROCEDURE — 97161 PT EVAL LOW COMPLEX 20 MIN: CPT

## 2017-01-01 PROCEDURE — 87040 BLOOD CULTURE FOR BACTERIA: CPT | Performed by: EMERGENCY MEDICINE

## 2017-01-01 PROCEDURE — 85007 BL SMEAR W/DIFF WBC COUNT: CPT | Performed by: EMERGENCY MEDICINE

## 2017-01-01 PROCEDURE — 85025 COMPLETE CBC W/AUTO DIFF WBC: CPT | Performed by: HOSPITALIST

## 2017-01-01 PROCEDURE — 25010000002 ONDANSETRON PER 1 MG: Performed by: INTERNAL MEDICINE

## 2017-01-01 PROCEDURE — 80048 BASIC METABOLIC PNL TOTAL CA: CPT | Performed by: HOSPITALIST

## 2017-01-01 PROCEDURE — 25010000002 LEVOFLOXACIN PER 250 MG: Performed by: INTERNAL MEDICINE

## 2017-01-01 PROCEDURE — 25010000002 PIPERACILLIN SOD-TAZOBACTAM PER 1 G: Performed by: EMERGENCY MEDICINE

## 2017-01-01 PROCEDURE — 82140 ASSAY OF AMMONIA: CPT | Performed by: HOSPITALIST

## 2017-01-01 PROCEDURE — 85025 COMPLETE CBC W/AUTO DIFF WBC: CPT | Performed by: INTERNAL MEDICINE

## 2017-01-01 PROCEDURE — 84484 ASSAY OF TROPONIN QUANT: CPT | Performed by: EMERGENCY MEDICINE

## 2017-01-01 PROCEDURE — 83605 ASSAY OF LACTIC ACID: CPT | Performed by: EMERGENCY MEDICINE

## 2017-01-01 PROCEDURE — 92610 EVALUATE SWALLOWING FUNCTION: CPT

## 2017-01-01 PROCEDURE — 85007 BL SMEAR W/DIFF WBC COUNT: CPT | Performed by: INTERNAL MEDICINE

## 2017-01-01 PROCEDURE — 84165 PROTEIN E-PHORESIS SERUM: CPT | Performed by: INTERNAL MEDICINE

## 2017-01-01 PROCEDURE — 87086 URINE CULTURE/COLONY COUNT: CPT | Performed by: EMERGENCY MEDICINE

## 2017-01-01 PROCEDURE — 92610 EVALUATE SWALLOWING FUNCTION: CPT | Performed by: SPEECH-LANGUAGE PATHOLOGIST

## 2017-01-01 PROCEDURE — 71250 CT THORAX DX C-: CPT

## 2017-01-01 PROCEDURE — 80164 ASSAY DIPROPYLACETIC ACD TOT: CPT | Performed by: EMERGENCY MEDICINE

## 2017-01-01 PROCEDURE — 84443 ASSAY THYROID STIM HORMONE: CPT | Performed by: HOSPITALIST

## 2017-01-01 PROCEDURE — 85027 COMPLETE CBC AUTOMATED: CPT | Performed by: INTERNAL MEDICINE

## 2017-01-01 PROCEDURE — 94640 AIRWAY INHALATION TREATMENT: CPT

## 2017-01-01 PROCEDURE — 85610 PROTHROMBIN TIME: CPT | Performed by: EMERGENCY MEDICINE

## 2017-01-01 PROCEDURE — 84145 PROCALCITONIN (PCT): CPT | Performed by: INTERNAL MEDICINE

## 2017-01-01 PROCEDURE — 84145 PROCALCITONIN (PCT): CPT | Performed by: NURSE PRACTITIONER

## 2017-01-01 PROCEDURE — 87804 INFLUENZA ASSAY W/OPTIC: CPT | Performed by: EMERGENCY MEDICINE

## 2017-01-01 PROCEDURE — 93005 ELECTROCARDIOGRAM TRACING: CPT

## 2017-01-01 PROCEDURE — 99231 SBSQ HOSP IP/OBS SF/LOW 25: CPT | Performed by: INTERNAL MEDICINE

## 2017-01-01 PROCEDURE — 71020 HC CHEST PA AND LATERAL: CPT

## 2017-01-01 PROCEDURE — 25010000003 POTASSIUM CHLORIDE 10 MEQ/100ML SOLUTION: Performed by: INTERNAL MEDICINE

## 2017-01-01 PROCEDURE — 83540 ASSAY OF IRON: CPT | Performed by: INTERNAL MEDICINE

## 2017-01-01 RX ORDER — ACETAMINOPHEN 650 MG/1
650 SUPPOSITORY RECTAL EVERY 4 HOURS PRN
Status: CANCELLED | OUTPATIENT
Start: 2017-01-01

## 2017-01-01 RX ORDER — LORAZEPAM 2 MG/ML
0.5 CONCENTRATE ORAL
Status: DISCONTINUED | OUTPATIENT
Start: 2017-01-01 | End: 2017-01-01 | Stop reason: HOSPADM

## 2017-01-01 RX ORDER — LORAZEPAM 2 MG/ML
1 CONCENTRATE ORAL
Status: CANCELLED | OUTPATIENT
Start: 2017-01-01 | End: 2017-08-25

## 2017-01-01 RX ORDER — ACETAMINOPHEN 650 MG/1
650 SUPPOSITORY RECTAL ONCE
Status: COMPLETED | OUTPATIENT
Start: 2017-01-01 | End: 2017-01-01

## 2017-01-01 RX ORDER — SCOLOPAMINE TRANSDERMAL SYSTEM 1 MG/1
1 PATCH, EXTENDED RELEASE TRANSDERMAL
Status: CANCELLED | OUTPATIENT
Start: 2017-01-01

## 2017-01-01 RX ORDER — HALOPERIDOL 2 MG/ML
1 SOLUTION ORAL EVERY 4 HOURS PRN
Status: DISCONTINUED | OUTPATIENT
Start: 2017-01-01 | End: 2017-01-01 | Stop reason: HOSPADM

## 2017-01-01 RX ORDER — MELATONIN
Qty: 28 TABLET | Refills: 3 | Status: SHIPPED | OUTPATIENT
Start: 2017-01-01

## 2017-01-01 RX ORDER — HALOPERIDOL 5 MG/ML
2 INJECTION INTRAMUSCULAR EVERY 4 HOURS PRN
Status: DISCONTINUED | OUTPATIENT
Start: 2017-01-01 | End: 2017-01-01 | Stop reason: HOSPADM

## 2017-01-01 RX ORDER — SERTRALINE HYDROCHLORIDE 100 MG/1
100 TABLET, FILM COATED ORAL DAILY
Status: DISCONTINUED | OUTPATIENT
Start: 2017-01-01 | End: 2017-01-01

## 2017-01-01 RX ORDER — GLYCOPYRROLATE 0.2 MG/ML
0.2 INJECTION INTRAMUSCULAR; INTRAVENOUS
Status: DISCONTINUED | OUTPATIENT
Start: 2017-01-01 | End: 2017-01-01 | Stop reason: HOSPADM

## 2017-01-01 RX ORDER — ARFORMOTEROL TARTRATE 15 UG/2ML
15 SOLUTION RESPIRATORY (INHALATION)
Status: DISCONTINUED | OUTPATIENT
Start: 2017-01-01 | End: 2017-01-01

## 2017-01-01 RX ORDER — SCOLOPAMINE TRANSDERMAL SYSTEM 1 MG/1
1 PATCH, EXTENDED RELEASE TRANSDERMAL
Status: DISCONTINUED | OUTPATIENT
Start: 2017-01-01 | End: 2017-01-01 | Stop reason: HOSPADM

## 2017-01-01 RX ORDER — HALOPERIDOL 1 MG/1
1 TABLET ORAL EVERY 4 HOURS PRN
Status: DISCONTINUED | OUTPATIENT
Start: 2017-01-01 | End: 2017-01-01 | Stop reason: HOSPADM

## 2017-01-01 RX ORDER — POTASSIUM CHLORIDE 7.45 MG/ML
10 INJECTION INTRAVENOUS
Status: DISCONTINUED | OUTPATIENT
Start: 2017-01-01 | End: 2017-01-01 | Stop reason: HOSPADM

## 2017-01-01 RX ORDER — HYDROMORPHONE HCL 110MG/55ML
2 PATIENT CONTROLLED ANALGESIA SYRINGE INTRAVENOUS
Status: DISCONTINUED | OUTPATIENT
Start: 2017-01-01 | End: 2017-01-01 | Stop reason: HOSPADM

## 2017-01-01 RX ORDER — PANTOPRAZOLE SODIUM 40 MG/1
40 TABLET, DELAYED RELEASE ORAL DAILY
Qty: 30 TABLET | Refills: 3 | Status: SHIPPED | OUTPATIENT
Start: 2017-01-01

## 2017-01-01 RX ORDER — HALOPERIDOL 1 MG/1
1 TABLET ORAL EVERY 4 HOURS PRN
Status: CANCELLED | OUTPATIENT
Start: 2017-01-01

## 2017-01-01 RX ORDER — HYDROMORPHONE HCL 110MG/55ML
1.5 PATIENT CONTROLLED ANALGESIA SYRINGE INTRAVENOUS
Status: DISCONTINUED | OUTPATIENT
Start: 2017-01-01 | End: 2017-01-01 | Stop reason: HOSPADM

## 2017-01-01 RX ORDER — PROMETHAZINE HYDROCHLORIDE 25 MG/1
25 SUPPOSITORY RECTAL EVERY 4 HOURS PRN
Status: CANCELLED | OUTPATIENT
Start: 2017-01-01

## 2017-01-01 RX ORDER — HALOPERIDOL 1 MG/1
2 TABLET ORAL EVERY 4 HOURS PRN
Status: DISCONTINUED | OUTPATIENT
Start: 2017-01-01 | End: 2017-01-01 | Stop reason: HOSPADM

## 2017-01-01 RX ORDER — IPRATROPIUM BROMIDE AND ALBUTEROL SULFATE 2.5; .5 MG/3ML; MG/3ML
3 SOLUTION RESPIRATORY (INHALATION)
Status: DISCONTINUED | OUTPATIENT
Start: 2017-01-01 | End: 2017-01-01 | Stop reason: HOSPADM

## 2017-01-01 RX ORDER — NICOTINE 21 MG/24HR
1 PATCH, TRANSDERMAL 24 HOURS TRANSDERMAL EVERY 24 HOURS
Qty: 30 PATCH | Refills: 1 | Status: SHIPPED | OUTPATIENT
Start: 2017-01-01

## 2017-01-01 RX ORDER — PROMETHAZINE HYDROCHLORIDE 25 MG/ML
25 INJECTION, SOLUTION INTRAMUSCULAR; INTRAVENOUS EVERY 4 HOURS PRN
Status: CANCELLED | OUTPATIENT
Start: 2017-01-01

## 2017-01-01 RX ORDER — MELATONIN
Qty: 30 TABLET | Refills: 3 | Status: SHIPPED | OUTPATIENT
Start: 2017-01-01 | End: 2017-01-01 | Stop reason: SDUPTHER

## 2017-01-01 RX ORDER — SERTRALINE HYDROCHLORIDE 100 MG/1
100 TABLET, FILM COATED ORAL DAILY
Status: DISCONTINUED | OUTPATIENT
Start: 2017-01-01 | End: 2017-01-01 | Stop reason: HOSPADM

## 2017-01-01 RX ORDER — LORAZEPAM 2 MG/ML
2 INJECTION INTRAMUSCULAR
Status: DISCONTINUED | OUTPATIENT
Start: 2017-01-01 | End: 2017-01-01 | Stop reason: HOSPADM

## 2017-01-01 RX ORDER — HYDROMORPHONE HCL 110MG/55ML
2 PATIENT CONTROLLED ANALGESIA SYRINGE INTRAVENOUS
Status: CANCELLED | OUTPATIENT
Start: 2017-01-01 | End: 2017-08-25

## 2017-01-01 RX ORDER — POTASSIUM CHLORIDE 7.45 MG/ML
10 INJECTION INTRAVENOUS
Status: DISCONTINUED | OUTPATIENT
Start: 2017-01-01 | End: 2017-01-01

## 2017-01-01 RX ORDER — LORAZEPAM 2 MG/ML
2 INJECTION INTRAMUSCULAR
Status: CANCELLED | OUTPATIENT
Start: 2017-01-01 | End: 2017-08-25

## 2017-01-01 RX ORDER — HALOPERIDOL 2 MG/ML
2 SOLUTION ORAL EVERY 4 HOURS PRN
Status: CANCELLED | OUTPATIENT
Start: 2017-01-01

## 2017-01-01 RX ORDER — SODIUM CHLORIDE 0.9 % (FLUSH) 0.9 %
1-10 SYRINGE (ML) INJECTION AS NEEDED
Status: DISCONTINUED | OUTPATIENT
Start: 2017-01-01 | End: 2017-01-01

## 2017-01-01 RX ORDER — LORAZEPAM 2 MG/ML
1 CONCENTRATE ORAL
Status: DISCONTINUED | OUTPATIENT
Start: 2017-01-01 | End: 2017-01-01 | Stop reason: HOSPADM

## 2017-01-01 RX ORDER — DEXTROSE AND SODIUM CHLORIDE 5; .45 G/100ML; G/100ML
100 INJECTION, SOLUTION INTRAVENOUS CONTINUOUS
Status: DISCONTINUED | OUTPATIENT
Start: 2017-01-01 | End: 2017-01-01

## 2017-01-01 RX ORDER — PROMETHAZINE HYDROCHLORIDE 25 MG/1
25 TABLET ORAL EVERY 4 HOURS PRN
Status: DISCONTINUED | OUTPATIENT
Start: 2017-01-01 | End: 2017-01-01 | Stop reason: HOSPADM

## 2017-01-01 RX ORDER — GLYCOPYRROLATE 0.2 MG/ML
0.4 INJECTION INTRAMUSCULAR; INTRAVENOUS
Status: CANCELLED | OUTPATIENT
Start: 2017-01-01

## 2017-01-01 RX ORDER — LORAZEPAM 1 MG/1
2 TABLET ORAL
Status: DISCONTINUED | OUTPATIENT
Start: 2017-01-01 | End: 2017-01-01 | Stop reason: HOSPADM

## 2017-01-01 RX ORDER — OMEPRAZOLE 20 MG/1
20 CAPSULE, DELAYED RELEASE ORAL DAILY
COMMUNITY

## 2017-01-01 RX ORDER — LORAZEPAM 2 MG/ML
0.5 INJECTION INTRAMUSCULAR
Status: CANCELLED | OUTPATIENT
Start: 2017-01-01 | End: 2017-08-25

## 2017-01-01 RX ORDER — LORAZEPAM 2 MG/ML
0.5 INJECTION INTRAMUSCULAR
Status: DISCONTINUED | OUTPATIENT
Start: 2017-01-01 | End: 2017-01-01 | Stop reason: HOSPADM

## 2017-01-01 RX ORDER — FERROUS SULFATE 325(65) MG
325 TABLET ORAL
COMMUNITY

## 2017-01-01 RX ORDER — SODIUM CHLORIDE AND POTASSIUM CHLORIDE 300; 900 MG/100ML; MG/100ML
100 INJECTION, SOLUTION INTRAVENOUS CONTINUOUS
Status: DISCONTINUED | OUTPATIENT
Start: 2017-01-01 | End: 2017-01-01

## 2017-01-01 RX ORDER — PROMETHAZINE HYDROCHLORIDE 6.25 MG/5ML
12.5 SYRUP ORAL EVERY 4 HOURS PRN
Status: DISCONTINUED | OUTPATIENT
Start: 2017-01-01 | End: 2017-01-01 | Stop reason: HOSPADM

## 2017-01-01 RX ORDER — ACETAMINOPHEN 325 MG/1
650 TABLET ORAL EVERY 4 HOURS PRN
Status: DISCONTINUED | OUTPATIENT
Start: 2017-01-01 | End: 2017-01-01

## 2017-01-01 RX ORDER — PROMETHAZINE HYDROCHLORIDE 12.5 MG/1
12.5 SUPPOSITORY RECTAL EVERY 4 HOURS PRN
Status: DISCONTINUED | OUTPATIENT
Start: 2017-01-01 | End: 2017-01-01 | Stop reason: HOSPADM

## 2017-01-01 RX ORDER — POTASSIUM CHLORIDE 1.5 G/1.77G
40 POWDER, FOR SOLUTION ORAL AS NEEDED
Status: DISCONTINUED | OUTPATIENT
Start: 2017-01-01 | End: 2017-01-01 | Stop reason: CLARIF

## 2017-01-01 RX ORDER — PROMETHAZINE HYDROCHLORIDE 25 MG/ML
25 INJECTION, SOLUTION INTRAMUSCULAR; INTRAVENOUS EVERY 4 HOURS PRN
Status: DISCONTINUED | OUTPATIENT
Start: 2017-01-01 | End: 2017-01-01 | Stop reason: HOSPADM

## 2017-01-01 RX ORDER — GLYCOPYRROLATE 0.2 MG/ML
0.4 INJECTION INTRAMUSCULAR; INTRAVENOUS
Status: DISCONTINUED | OUTPATIENT
Start: 2017-01-01 | End: 2017-01-01 | Stop reason: HOSPADM

## 2017-01-01 RX ORDER — SODIUM CHLORIDE 0.9 % (FLUSH) 0.9 %
1-10 SYRINGE (ML) INJECTION AS NEEDED
Status: DISCONTINUED | OUTPATIENT
Start: 2017-01-01 | End: 2017-01-01 | Stop reason: HOSPADM

## 2017-01-01 RX ORDER — BENZTROPINE MESYLATE 1 MG/1
1 TABLET ORAL 2 TIMES DAILY
Status: DISCONTINUED | OUTPATIENT
Start: 2017-01-01 | End: 2017-01-01 | Stop reason: HOSPADM

## 2017-01-01 RX ORDER — ACETAMINOPHEN 160 MG/5ML
650 SOLUTION ORAL EVERY 4 HOURS PRN
Status: CANCELLED | OUTPATIENT
Start: 2017-01-01

## 2017-01-01 RX ORDER — LORATADINE 10 MG/1
TABLET ORAL
Qty: 30 TABLET | Refills: 3 | Status: SHIPPED | OUTPATIENT
Start: 2017-01-01 | End: 2017-01-01 | Stop reason: HOSPADM

## 2017-01-01 RX ORDER — LORAZEPAM 2 MG/ML
1 INJECTION INTRAMUSCULAR
Status: DISCONTINUED | OUTPATIENT
Start: 2017-01-01 | End: 2017-01-01 | Stop reason: HOSPADM

## 2017-01-01 RX ORDER — PROMETHAZINE HYDROCHLORIDE 6.25 MG/5ML
12.5 SYRUP ORAL EVERY 4 HOURS PRN
Status: CANCELLED | OUTPATIENT
Start: 2017-01-01

## 2017-01-01 RX ORDER — LETROZOLE 2.5 MG/1
2.5 TABLET, FILM COATED ORAL DAILY
Status: DISCONTINUED | OUTPATIENT
Start: 2017-01-01 | End: 2017-01-01

## 2017-01-01 RX ORDER — NICOTINE 21 MG/24HR
1 PATCH, TRANSDERMAL 24 HOURS TRANSDERMAL EVERY 24 HOURS
Status: CANCELLED | OUTPATIENT
Start: 2017-01-01

## 2017-01-01 RX ORDER — LORATADINE 10 MG/1
10 TABLET ORAL DAILY
Qty: 30 TABLET | Refills: 2 | Status: SHIPPED | OUTPATIENT
Start: 2017-01-01 | End: 2017-01-01 | Stop reason: SDUPTHER

## 2017-01-01 RX ORDER — SODIUM CHLORIDE 0.9 % (FLUSH) 0.9 %
10 SYRINGE (ML) INJECTION AS NEEDED
Status: DISCONTINUED | OUTPATIENT
Start: 2017-01-01 | End: 2017-01-01

## 2017-01-01 RX ORDER — HYDROMORPHONE HCL 110MG/55ML
1.5 PATIENT CONTROLLED ANALGESIA SYRINGE INTRAVENOUS
Status: CANCELLED | OUTPATIENT
Start: 2017-01-01 | End: 2017-08-25

## 2017-01-01 RX ORDER — HALOPERIDOL 5 MG/ML
1 INJECTION INTRAMUSCULAR EVERY 4 HOURS PRN
Status: CANCELLED | OUTPATIENT
Start: 2017-01-01

## 2017-01-01 RX ORDER — ACETAMINOPHEN 325 MG/1
650 TABLET ORAL EVERY 4 HOURS PRN
Status: DISCONTINUED | OUTPATIENT
Start: 2017-01-01 | End: 2017-01-01 | Stop reason: HOSPADM

## 2017-01-01 RX ORDER — PROMETHAZINE HYDROCHLORIDE 25 MG/1
25 SUPPOSITORY RECTAL EVERY 4 HOURS PRN
Status: DISCONTINUED | OUTPATIENT
Start: 2017-01-01 | End: 2017-01-01 | Stop reason: HOSPADM

## 2017-01-01 RX ORDER — PROMETHAZINE HYDROCHLORIDE 6.25 MG/5ML
25 SYRUP ORAL EVERY 4 HOURS PRN
Status: CANCELLED | OUTPATIENT
Start: 2017-01-01

## 2017-01-01 RX ORDER — HALOPERIDOL 2 MG/ML
1 SOLUTION ORAL EVERY 4 HOURS PRN
Status: CANCELLED | OUTPATIENT
Start: 2017-01-01

## 2017-01-01 RX ORDER — LORAZEPAM 1 MG/1
1 TABLET ORAL
Status: DISCONTINUED | OUTPATIENT
Start: 2017-01-01 | End: 2017-01-01 | Stop reason: HOSPADM

## 2017-01-01 RX ORDER — GLYCOPYRROLATE 0.2 MG/ML
0.2 INJECTION INTRAMUSCULAR; INTRAVENOUS
Status: CANCELLED | OUTPATIENT
Start: 2017-01-01

## 2017-01-01 RX ORDER — POTASSIUM CHLORIDE 1.5 G/1.77G
40 POWDER, FOR SOLUTION ORAL AS NEEDED
Status: DISCONTINUED | OUTPATIENT
Start: 2017-01-01 | End: 2017-01-01 | Stop reason: HOSPADM

## 2017-01-01 RX ORDER — MIDODRINE HYDROCHLORIDE 5 MG/1
5 TABLET ORAL 3 TIMES DAILY
Status: DISCONTINUED | OUTPATIENT
Start: 2017-01-01 | End: 2017-01-01

## 2017-01-01 RX ORDER — LORAZEPAM 2 MG/ML
2 CONCENTRATE ORAL
Status: DISCONTINUED | OUTPATIENT
Start: 2017-01-01 | End: 2017-01-01 | Stop reason: HOSPADM

## 2017-01-01 RX ORDER — ANASTROZOLE 1 MG/1
TABLET ORAL
Qty: 30 TABLET | Refills: 1 | Status: SHIPPED | OUTPATIENT
Start: 2017-01-01 | End: 2017-01-01 | Stop reason: SDUPTHER

## 2017-01-01 RX ORDER — LORAZEPAM 2 MG/ML
2 CONCENTRATE ORAL
Status: CANCELLED | OUTPATIENT
Start: 2017-01-01 | End: 2017-08-25

## 2017-01-01 RX ORDER — LORAZEPAM 0.5 MG/1
0.5 TABLET ORAL
Status: DISCONTINUED | OUTPATIENT
Start: 2017-01-01 | End: 2017-01-01 | Stop reason: HOSPADM

## 2017-01-01 RX ORDER — OSELTAMIVIR PHOSPHATE 6 MG/ML
75 FOR SUSPENSION ORAL EVERY 12 HOURS SCHEDULED
Status: DISCONTINUED | OUTPATIENT
Start: 2017-01-01 | End: 2017-01-01

## 2017-01-01 RX ORDER — BUDESONIDE 0.25 MG/2ML
0.5 INHALANT ORAL 2 TIMES DAILY
COMMUNITY

## 2017-01-01 RX ORDER — FUROSEMIDE 10 MG/ML
20 INJECTION INTRAMUSCULAR; INTRAVENOUS ONCE
Status: COMPLETED | OUTPATIENT
Start: 2017-01-01 | End: 2017-01-01

## 2017-01-01 RX ORDER — ASPIRIN 81 MG/1
TABLET, CHEWABLE ORAL
Qty: 30 TABLET | Refills: 6 | Status: SHIPPED | OUTPATIENT
Start: 2017-01-01

## 2017-01-01 RX ORDER — ATORVASTATIN CALCIUM 20 MG/1
40 TABLET, FILM COATED ORAL DAILY
Status: DISCONTINUED | OUTPATIENT
Start: 2017-01-01 | End: 2017-01-01

## 2017-01-01 RX ORDER — NICOTINE 21 MG/24HR
1 PATCH, TRANSDERMAL 24 HOURS TRANSDERMAL EVERY 24 HOURS
Status: DISCONTINUED | OUTPATIENT
Start: 2017-01-01 | End: 2017-01-01 | Stop reason: HOSPADM

## 2017-01-01 RX ORDER — CLOZAPINE 100 MG/1
100 TABLET ORAL NIGHTLY
Qty: 30 TABLET | Refills: 0 | Status: SHIPPED | OUTPATIENT
Start: 2017-01-01

## 2017-01-01 RX ORDER — PROMETHAZINE HYDROCHLORIDE 25 MG/1
12.5 TABLET ORAL EVERY 4 HOURS PRN
Status: CANCELLED | OUTPATIENT
Start: 2017-01-01

## 2017-01-01 RX ORDER — ACETAMINOPHEN 650 MG/1
650 SUPPOSITORY RECTAL EVERY 4 HOURS PRN
Status: DISCONTINUED | OUTPATIENT
Start: 2017-01-01 | End: 2017-01-01

## 2017-01-01 RX ORDER — HYDROCODONE BITARTRATE AND ACETAMINOPHEN 5; 325 MG/1; MG/1
1 TABLET ORAL EVERY 6 HOURS PRN
Status: DISCONTINUED | OUTPATIENT
Start: 2017-01-01 | End: 2017-01-01

## 2017-01-01 RX ORDER — HALOPERIDOL 5 MG/ML
1 INJECTION INTRAMUSCULAR EVERY 4 HOURS PRN
Status: DISCONTINUED | OUTPATIENT
Start: 2017-01-01 | End: 2017-01-01 | Stop reason: HOSPADM

## 2017-01-01 RX ORDER — PROMETHAZINE HYDROCHLORIDE 25 MG/ML
12.5 INJECTION, SOLUTION INTRAMUSCULAR; INTRAVENOUS EVERY 4 HOURS PRN
Status: CANCELLED | OUTPATIENT
Start: 2017-01-01

## 2017-01-01 RX ORDER — PROMETHAZINE HYDROCHLORIDE 25 MG/ML
12.5 INJECTION, SOLUTION INTRAMUSCULAR; INTRAVENOUS EVERY 4 HOURS PRN
Status: DISCONTINUED | OUTPATIENT
Start: 2017-01-01 | End: 2017-01-01 | Stop reason: HOSPADM

## 2017-01-01 RX ORDER — PANTOPRAZOLE SODIUM 40 MG/1
TABLET, DELAYED RELEASE ORAL
Qty: 30 TABLET | Refills: 3 | Status: SHIPPED | OUTPATIENT
Start: 2017-01-01 | End: 2017-01-01 | Stop reason: SDUPTHER

## 2017-01-01 RX ORDER — ACETAMINOPHEN 325 MG/1
650 TABLET ORAL EVERY 4 HOURS PRN
Status: CANCELLED | OUTPATIENT
Start: 2017-01-01

## 2017-01-01 RX ORDER — ACETAMINOPHEN 650 MG/1
650 SUPPOSITORY RECTAL EVERY 4 HOURS PRN
Status: DISCONTINUED | OUTPATIENT
Start: 2017-01-01 | End: 2017-01-01 | Stop reason: HOSPADM

## 2017-01-01 RX ORDER — PROMETHAZINE HYDROCHLORIDE 6.25 MG/5ML
25 SYRUP ORAL EVERY 4 HOURS PRN
Status: DISCONTINUED | OUTPATIENT
Start: 2017-01-01 | End: 2017-01-01 | Stop reason: HOSPADM

## 2017-01-01 RX ORDER — LORAZEPAM 2 MG/ML
1 INJECTION INTRAMUSCULAR
Status: CANCELLED | OUTPATIENT
Start: 2017-01-01 | End: 2017-08-25

## 2017-01-01 RX ORDER — ASPIRIN 81 MG/1
81 TABLET, CHEWABLE ORAL DAILY
Status: DISCONTINUED | OUTPATIENT
Start: 2017-01-01 | End: 2017-01-01

## 2017-01-01 RX ORDER — SODIUM CHLORIDE 0.9 % (FLUSH) 0.9 %
10 SYRINGE (ML) INJECTION AS NEEDED
Status: DISCONTINUED | OUTPATIENT
Start: 2017-01-01 | End: 2017-01-01 | Stop reason: HOSPADM

## 2017-01-01 RX ORDER — DEXTROSE AND SODIUM CHLORIDE 5; .45 G/100ML; G/100ML
75 INJECTION, SOLUTION INTRAVENOUS CONTINUOUS
Status: DISCONTINUED | OUTPATIENT
Start: 2017-01-01 | End: 2017-01-01

## 2017-01-01 RX ORDER — ATORVASTATIN CALCIUM 40 MG/1
TABLET, FILM COATED ORAL
Qty: 90 TABLET | Refills: 1 | Status: SHIPPED | OUTPATIENT
Start: 2017-01-01

## 2017-01-01 RX ORDER — PROMETHAZINE HYDROCHLORIDE 25 MG/1
12.5 TABLET ORAL EVERY 4 HOURS PRN
Status: DISCONTINUED | OUTPATIENT
Start: 2017-01-01 | End: 2017-01-01 | Stop reason: HOSPADM

## 2017-01-01 RX ORDER — OLANZAPINE 10 MG/1
7.5 INJECTION, POWDER, LYOPHILIZED, FOR SOLUTION INTRAMUSCULAR ONCE AS NEEDED
Status: DISCONTINUED | OUTPATIENT
Start: 2017-01-01 | End: 2017-01-01

## 2017-01-01 RX ORDER — LORAZEPAM 1 MG/1
1 TABLET ORAL EVERY 6 HOURS PRN
Status: DISCONTINUED | OUTPATIENT
Start: 2017-01-01 | End: 2017-01-01

## 2017-01-01 RX ORDER — CLOZAPINE 100 MG/1
100 TABLET ORAL NIGHTLY
Status: DISCONTINUED | OUTPATIENT
Start: 2017-01-01 | End: 2017-01-01 | Stop reason: HOSPADM

## 2017-01-01 RX ORDER — PANTOPRAZOLE SODIUM 40 MG/1
40 TABLET, DELAYED RELEASE ORAL EVERY MORNING
Status: CANCELLED | OUTPATIENT
Start: 2017-01-01

## 2017-01-01 RX ORDER — SODIUM CHLORIDE 9 MG/ML
100 INJECTION, SOLUTION INTRAVENOUS CONTINUOUS
Status: DISCONTINUED | OUTPATIENT
Start: 2017-01-01 | End: 2017-01-01

## 2017-01-01 RX ORDER — DIVALPROEX SODIUM 500 MG/1
500 TABLET, DELAYED RELEASE ORAL NIGHTLY
Status: DISCONTINUED | OUTPATIENT
Start: 2017-01-01 | End: 2017-01-01

## 2017-01-01 RX ORDER — LEVOFLOXACIN 5 MG/ML
500 INJECTION, SOLUTION INTRAVENOUS EVERY 24 HOURS
Status: DISCONTINUED | OUTPATIENT
Start: 2017-01-01 | End: 2017-01-01

## 2017-01-01 RX ORDER — MONTELUKAST SODIUM 10 MG/1
TABLET ORAL
Qty: 28 TABLET | Refills: 1 | Status: SHIPPED | OUTPATIENT
Start: 2017-01-01 | End: 2017-01-01 | Stop reason: HOSPADM

## 2017-01-01 RX ORDER — BENZTROPINE MESYLATE 1 MG/1
1 TABLET ORAL 2 TIMES DAILY
Status: DISCONTINUED | OUTPATIENT
Start: 2017-01-01 | End: 2017-01-01

## 2017-01-01 RX ORDER — HALOPERIDOL 5 MG/ML
2 INJECTION INTRAMUSCULAR EVERY 4 HOURS PRN
Status: CANCELLED | OUTPATIENT
Start: 2017-01-01

## 2017-01-01 RX ORDER — DIPHENOXYLATE HYDROCHLORIDE AND ATROPINE SULFATE 2.5; .025 MG/1; MG/1
1 TABLET ORAL
Status: DISCONTINUED | OUTPATIENT
Start: 2017-01-01 | End: 2017-01-01 | Stop reason: HOSPADM

## 2017-01-01 RX ORDER — MONTELUKAST SODIUM 10 MG/1
TABLET ORAL
Qty: 30 TABLET | Refills: 2 | Status: SHIPPED | OUTPATIENT
Start: 2017-01-01 | End: 2017-01-01 | Stop reason: SDUPTHER

## 2017-01-01 RX ORDER — ONDANSETRON 2 MG/ML
4 INJECTION INTRAMUSCULAR; INTRAVENOUS EVERY 6 HOURS PRN
Status: DISCONTINUED | OUTPATIENT
Start: 2017-01-01 | End: 2017-01-01 | Stop reason: HOSPADM

## 2017-01-01 RX ORDER — ALENDRONATE SODIUM 10 MG/1
TABLET ORAL
Qty: 12 TABLET | Refills: 3 | Status: SHIPPED | OUTPATIENT
Start: 2017-01-01

## 2017-01-01 RX ORDER — POTASSIUM CHLORIDE 750 MG/1
40 CAPSULE, EXTENDED RELEASE ORAL AS NEEDED
Status: DISCONTINUED | OUTPATIENT
Start: 2017-01-01 | End: 2017-01-01

## 2017-01-01 RX ORDER — LORAZEPAM 0.5 MG/1
0.5 TABLET ORAL
Status: CANCELLED | OUTPATIENT
Start: 2017-01-01 | End: 2017-08-25

## 2017-01-01 RX ORDER — ALBUTEROL SULFATE 2.5 MG/3ML
2.5 SOLUTION RESPIRATORY (INHALATION)
Status: COMPLETED | OUTPATIENT
Start: 2017-01-01 | End: 2017-01-01

## 2017-01-01 RX ORDER — IPRATROPIUM BROMIDE AND ALBUTEROL SULFATE 2.5; .5 MG/3ML; MG/3ML
3 SOLUTION RESPIRATORY (INHALATION) ONCE
Status: COMPLETED | OUTPATIENT
Start: 2017-01-01 | End: 2017-01-01

## 2017-01-01 RX ORDER — POTASSIUM CHLORIDE 750 MG/1
40 CAPSULE, EXTENDED RELEASE ORAL AS NEEDED
Status: DISCONTINUED | OUTPATIENT
Start: 2017-01-01 | End: 2017-01-01 | Stop reason: HOSPADM

## 2017-01-01 RX ORDER — OSELTAMIVIR PHOSPHATE 6 MG/ML
75 FOR SUSPENSION ORAL EVERY 12 HOURS SCHEDULED
Status: COMPLETED | OUTPATIENT
Start: 2017-01-01 | End: 2017-01-01

## 2017-01-01 RX ORDER — ACETAMINOPHEN 160 MG/5ML
650 SOLUTION ORAL EVERY 4 HOURS PRN
Status: DISCONTINUED | OUTPATIENT
Start: 2017-01-01 | End: 2017-01-01 | Stop reason: HOSPADM

## 2017-01-01 RX ORDER — PROMETHAZINE HYDROCHLORIDE 12.5 MG/1
12.5 SUPPOSITORY RECTAL EVERY 4 HOURS PRN
Status: CANCELLED | OUTPATIENT
Start: 2017-01-01

## 2017-01-01 RX ORDER — PANTOPRAZOLE SODIUM 40 MG/1
40 TABLET, DELAYED RELEASE ORAL EVERY MORNING
Status: DISCONTINUED | OUTPATIENT
Start: 2017-01-01 | End: 2017-01-01 | Stop reason: HOSPADM

## 2017-01-01 RX ORDER — AMOXICILLIN AND CLAVULANATE POTASSIUM 875; 125 MG/1; MG/1
1 TABLET, FILM COATED ORAL EVERY 12 HOURS SCHEDULED
Status: DISCONTINUED | OUTPATIENT
Start: 2017-01-01 | End: 2017-01-01

## 2017-01-01 RX ORDER — ANASTROZOLE 1 MG/1
TABLET ORAL
Qty: 30 TABLET | Refills: 2 | Status: SHIPPED | OUTPATIENT
Start: 2017-01-01

## 2017-01-01 RX ORDER — LEVOTHYROXINE SODIUM 112 UG/1
TABLET ORAL
Qty: 30 TABLET | Refills: 5 | Status: SHIPPED | OUTPATIENT
Start: 2017-01-01

## 2017-01-01 RX ORDER — DOCUSATE SODIUM 100 MG/1
CAPSULE, LIQUID FILLED ORAL
Qty: 60 CAPSULE | Refills: 1 | Status: SHIPPED | OUTPATIENT
Start: 2017-01-01

## 2017-01-01 RX ORDER — BENZTROPINE MESYLATE 1 MG/1
1 TABLET ORAL 2 TIMES DAILY
COMMUNITY

## 2017-01-01 RX ORDER — BUDESONIDE 0.25 MG/2ML
0.5 INHALANT ORAL 2 TIMES DAILY
Status: DISCONTINUED | OUTPATIENT
Start: 2017-01-01 | End: 2017-01-01 | Stop reason: CLARIF

## 2017-01-01 RX ORDER — DIPHENOXYLATE HYDROCHLORIDE AND ATROPINE SULFATE 2.5; .025 MG/1; MG/1
1 TABLET ORAL
Status: CANCELLED | OUTPATIENT
Start: 2017-01-01

## 2017-01-01 RX ORDER — HALOPERIDOL 2 MG/ML
2 SOLUTION ORAL EVERY 4 HOURS PRN
Status: DISCONTINUED | OUTPATIENT
Start: 2017-01-01 | End: 2017-01-01 | Stop reason: HOSPADM

## 2017-01-01 RX ORDER — DIVALPROEX SODIUM 500 MG/1
500 TABLET, DELAYED RELEASE ORAL DAILY
Status: DISCONTINUED | OUTPATIENT
Start: 2017-01-01 | End: 2017-01-01 | Stop reason: HOSPADM

## 2017-01-01 RX ORDER — LORAZEPAM 1 MG/1
2 TABLET ORAL
Status: CANCELLED | OUTPATIENT
Start: 2017-01-01 | End: 2017-08-25

## 2017-01-01 RX ORDER — ANASTROZOLE 1 MG/1
1 TABLET ORAL DAILY
Status: DISCONTINUED | OUTPATIENT
Start: 2017-01-01 | End: 2017-01-01

## 2017-01-01 RX ORDER — LEVOTHYROXINE SODIUM 112 UG/1
112 TABLET ORAL
Status: DISCONTINUED | OUTPATIENT
Start: 2017-01-01 | End: 2017-01-01

## 2017-01-01 RX ORDER — METHYLPREDNISOLONE SODIUM SUCCINATE 125 MG/2ML
125 INJECTION, POWDER, LYOPHILIZED, FOR SOLUTION INTRAMUSCULAR; INTRAVENOUS ONCE
Status: COMPLETED | OUTPATIENT
Start: 2017-01-01 | End: 2017-01-01

## 2017-01-01 RX ORDER — LORAZEPAM 2 MG/ML
0.5 CONCENTRATE ORAL
Status: CANCELLED | OUTPATIENT
Start: 2017-01-01 | End: 2017-08-25

## 2017-01-01 RX ORDER — LEVOFLOXACIN 5 MG/ML
750 INJECTION, SOLUTION INTRAVENOUS ONCE
Status: COMPLETED | OUTPATIENT
Start: 2017-01-01 | End: 2017-01-01

## 2017-01-01 RX ORDER — DIVALPROEX SODIUM 500 MG/1
1000 TABLET, DELAYED RELEASE ORAL NIGHTLY
Status: DISCONTINUED | OUTPATIENT
Start: 2017-01-01 | End: 2017-01-01 | Stop reason: HOSPADM

## 2017-01-01 RX ORDER — HALOPERIDOL 1 MG/1
2 TABLET ORAL EVERY 4 HOURS PRN
Status: CANCELLED | OUTPATIENT
Start: 2017-01-01

## 2017-01-01 RX ORDER — MIDODRINE HYDROCHLORIDE 5 MG/1
5 TABLET ORAL 3 TIMES DAILY
COMMUNITY

## 2017-01-01 RX ORDER — BUDESONIDE 0.5 MG/2ML
0.5 INHALANT ORAL
Status: DISCONTINUED | OUTPATIENT
Start: 2017-01-01 | End: 2017-01-01

## 2017-01-01 RX ORDER — ACETAMINOPHEN 325 MG/1
650 TABLET ORAL EVERY 6 HOURS PRN
Status: DISCONTINUED | OUTPATIENT
Start: 2017-01-01 | End: 2017-01-01 | Stop reason: HOSPADM

## 2017-01-01 RX ORDER — LORAZEPAM 1 MG/1
1 TABLET ORAL
Status: CANCELLED | OUTPATIENT
Start: 2017-01-01 | End: 2017-08-25

## 2017-01-01 RX ORDER — PROMETHAZINE HYDROCHLORIDE 25 MG/1
25 TABLET ORAL EVERY 4 HOURS PRN
Status: CANCELLED | OUTPATIENT
Start: 2017-01-01

## 2017-01-01 RX ORDER — FUROSEMIDE 10 MG/ML
40 INJECTION INTRAMUSCULAR; INTRAVENOUS ONCE
Status: COMPLETED | OUTPATIENT
Start: 2017-01-01 | End: 2017-01-01

## 2017-01-01 RX ORDER — IPRATROPIUM BROMIDE AND ALBUTEROL SULFATE 2.5; .5 MG/3ML; MG/3ML
3 SOLUTION RESPIRATORY (INHALATION)
Status: CANCELLED | OUTPATIENT
Start: 2017-01-01

## 2017-01-01 RX ADMIN — BENZTROPINE MESYLATE 1 MG: 1 TABLET ORAL at 17:31

## 2017-01-01 RX ADMIN — IPRATROPIUM BROMIDE AND ALBUTEROL SULFATE 3 ML: .5; 3 SOLUTION RESPIRATORY (INHALATION) at 03:48

## 2017-01-01 RX ADMIN — TAZOBACTAM SODIUM AND PIPERACILLIN SODIUM 3.38 G: 375; 3 INJECTION, SOLUTION INTRAVENOUS at 01:20

## 2017-01-01 RX ADMIN — POTASSIUM CHLORIDE 10 MEQ: 7.46 INJECTION, SOLUTION INTRAVENOUS at 08:38

## 2017-01-01 RX ADMIN — POTASSIUM CHLORIDE 10 MEQ: 7.46 INJECTION, SOLUTION INTRAVENOUS at 09:26

## 2017-01-01 RX ADMIN — NYSTATIN 500000 UNITS: 100000 SUSPENSION ORAL at 20:45

## 2017-01-01 RX ADMIN — IPRATROPIUM BROMIDE AND ALBUTEROL SULFATE 3 ML: .5; 3 SOLUTION RESPIRATORY (INHALATION) at 15:47

## 2017-01-01 RX ADMIN — ALBUTEROL SULFATE 2.5 MG: 2.5 SOLUTION RESPIRATORY (INHALATION) at 19:37

## 2017-01-01 RX ADMIN — ASPIRIN 81 MG: 81 TABLET, CHEWABLE ORAL at 11:35

## 2017-01-01 RX ADMIN — LORAZEPAM 1 MG: 1 TABLET ORAL at 11:37

## 2017-01-01 RX ADMIN — ACETAMINOPHEN 650 MG: 325 TABLET ORAL at 12:13

## 2017-01-01 RX ADMIN — LORAZEPAM 2 MG: 2 INJECTION INTRAMUSCULAR; INTRAVENOUS at 19:26

## 2017-01-01 RX ADMIN — NYSTATIN 500000 UNITS: 100000 SUSPENSION ORAL at 18:10

## 2017-01-01 RX ADMIN — IPRATROPIUM BROMIDE AND ALBUTEROL SULFATE 3 ML: .5; 3 SOLUTION RESPIRATORY (INHALATION) at 08:15

## 2017-01-01 RX ADMIN — IPRATROPIUM BROMIDE AND ALBUTEROL SULFATE 3 ML: .5; 3 SOLUTION RESPIRATORY (INHALATION) at 11:00

## 2017-01-01 RX ADMIN — TAZOBACTAM SODIUM AND PIPERACILLIN SODIUM 3.38 G: 375; 3 INJECTION, SOLUTION INTRAVENOUS at 08:55

## 2017-01-01 RX ADMIN — CLOZAPINE 100 MG: 100 TABLET ORAL at 20:21

## 2017-01-01 RX ADMIN — NICOTINE 1 PATCH: 21 PATCH, EXTENDED RELEASE TRANSDERMAL at 14:02

## 2017-01-01 RX ADMIN — BENZTROPINE MESYLATE 1 MG: 1 TABLET ORAL at 17:15

## 2017-01-01 RX ADMIN — MIDODRINE HYDROCHLORIDE 5 MG: 5 TABLET ORAL at 10:42

## 2017-01-01 RX ADMIN — LEVOFLOXACIN 500 MG: 500 INJECTION, SOLUTION INTRAVENOUS at 18:44

## 2017-01-01 RX ADMIN — DEXTROSE AND SODIUM CHLORIDE 75 ML/HR: 5; 450 INJECTION, SOLUTION INTRAVENOUS at 06:20

## 2017-01-01 RX ADMIN — IPRATROPIUM BROMIDE AND ALBUTEROL SULFATE 3 ML: .5; 3 SOLUTION RESPIRATORY (INHALATION) at 11:43

## 2017-01-01 RX ADMIN — NYSTATIN 500000 UNITS: 100000 SUSPENSION ORAL at 12:03

## 2017-01-01 RX ADMIN — IPRATROPIUM BROMIDE AND ALBUTEROL SULFATE 3 ML: .5; 3 SOLUTION RESPIRATORY (INHALATION) at 23:12

## 2017-01-01 RX ADMIN — ARFORMOTEROL TARTRATE 15 MCG: 15 SOLUTION RESPIRATORY (INHALATION) at 19:40

## 2017-01-01 RX ADMIN — BENZTROPINE MESYLATE 1 MG: 1 TABLET ORAL at 11:36

## 2017-01-01 RX ADMIN — SERTRALINE 100 MG: 100 TABLET, FILM COATED ORAL at 09:22

## 2017-01-01 RX ADMIN — IPRATROPIUM BROMIDE AND ALBUTEROL SULFATE 3 ML: .5; 3 SOLUTION RESPIRATORY (INHALATION) at 03:54

## 2017-01-01 RX ADMIN — SERTRALINE 100 MG: 100 TABLET, FILM COATED ORAL at 10:32

## 2017-01-01 RX ADMIN — NYSTATIN 500000 UNITS: 100000 SUSPENSION ORAL at 09:19

## 2017-01-01 RX ADMIN — IPRATROPIUM BROMIDE AND ALBUTEROL SULFATE 3 ML: .5; 3 SOLUTION RESPIRATORY (INHALATION) at 06:55

## 2017-01-01 RX ADMIN — LORAZEPAM 2 MG: 2 INJECTION INTRAMUSCULAR; INTRAVENOUS at 16:44

## 2017-01-01 RX ADMIN — NYSTATIN 500000 UNITS: 100000 SUSPENSION ORAL at 13:17

## 2017-01-01 RX ADMIN — ACETAMINOPHEN 650 MG: 325 TABLET ORAL at 21:07

## 2017-01-01 RX ADMIN — LORAZEPAM 2 MG: 2 INJECTION INTRAMUSCULAR; INTRAVENOUS at 09:36

## 2017-01-01 RX ADMIN — TAZOBACTAM SODIUM AND PIPERACILLIN SODIUM 4.5 G: 500; 4 INJECTION, SOLUTION INTRAVENOUS at 21:00

## 2017-01-01 RX ADMIN — BENZTROPINE MESYLATE 1 MG: 1 TABLET ORAL at 17:34

## 2017-01-01 RX ADMIN — LORAZEPAM 2 MG: 2 INJECTION INTRAMUSCULAR; INTRAVENOUS at 21:37

## 2017-01-01 RX ADMIN — NYSTATIN 500000 UNITS: 100000 SUSPENSION ORAL at 12:06

## 2017-01-01 RX ADMIN — ARFORMOTEROL TARTRATE 15 MCG: 15 SOLUTION RESPIRATORY (INHALATION) at 08:45

## 2017-01-01 RX ADMIN — BENZTROPINE MESYLATE 1 MG: 1 TABLET ORAL at 17:21

## 2017-01-01 RX ADMIN — LEVOTHYROXINE SODIUM 112 MCG: 112 TABLET ORAL at 06:07

## 2017-01-01 RX ADMIN — VANCOMYCIN HYDROCHLORIDE 2500 MG: 1 INJECTION, POWDER, LYOPHILIZED, FOR SOLUTION INTRAVENOUS at 04:24

## 2017-01-01 RX ADMIN — NYSTATIN 500000 UNITS: 100000 SUSPENSION ORAL at 12:22

## 2017-01-01 RX ADMIN — HYDROCODONE BITARTRATE AND ACETAMINOPHEN 1 TABLET: 5; 325 TABLET ORAL at 15:40

## 2017-01-01 RX ADMIN — GLYCOPYRROLATE 0.4 MG: 0.2 INJECTION, SOLUTION INTRAMUSCULAR; INTRAVENOUS at 23:44

## 2017-01-01 RX ADMIN — IPRATROPIUM BROMIDE AND ALBUTEROL SULFATE 3 ML: .5; 3 SOLUTION RESPIRATORY (INHALATION) at 03:23

## 2017-01-01 RX ADMIN — BENZTROPINE MESYLATE 1 MG: 1 TABLET ORAL at 10:32

## 2017-01-01 RX ADMIN — DEXTROSE AND SODIUM CHLORIDE 100 ML/HR: 5; 450 INJECTION, SOLUTION INTRAVENOUS at 14:09

## 2017-01-01 RX ADMIN — ARFORMOTEROL TARTRATE 15 MCG: 15 SOLUTION RESPIRATORY (INHALATION) at 09:56

## 2017-01-01 RX ADMIN — MIDODRINE HYDROCHLORIDE 5 MG: 5 TABLET ORAL at 16:07

## 2017-01-01 RX ADMIN — OSELTAMIVIR PHOSPHATE 75 MG: 6 POWDER, FOR SUSPENSION ORAL at 11:22

## 2017-01-01 RX ADMIN — ACETAMINOPHEN 650 MG: 650 SUPPOSITORY RECTAL at 19:43

## 2017-01-01 RX ADMIN — ACETAMINOPHEN 650 MG: 325 TABLET ORAL at 23:45

## 2017-01-01 RX ADMIN — IPRATROPIUM BROMIDE AND ALBUTEROL SULFATE 3 ML: .5; 3 SOLUTION RESPIRATORY (INHALATION) at 12:00

## 2017-01-01 RX ADMIN — MIDODRINE HYDROCHLORIDE 5 MG: 5 TABLET ORAL at 22:08

## 2017-01-01 RX ADMIN — IPRATROPIUM BROMIDE AND ALBUTEROL SULFATE 3 ML: .5; 3 SOLUTION RESPIRATORY (INHALATION) at 20:17

## 2017-01-01 RX ADMIN — IPRATROPIUM BROMIDE AND ALBUTEROL SULFATE 3 ML: .5; 3 SOLUTION RESPIRATORY (INHALATION) at 16:23

## 2017-01-01 RX ADMIN — NYSTATIN 500000 UNITS: 100000 SUSPENSION ORAL at 20:27

## 2017-01-01 RX ADMIN — NYSTATIN 500000 UNITS: 100000 SUSPENSION ORAL at 17:03

## 2017-01-01 RX ADMIN — HYDROMORPHONE HYDROCHLORIDE 2 MG: 2 INJECTION, SOLUTION INTRAMUSCULAR; INTRAVENOUS; SUBCUTANEOUS at 03:14

## 2017-01-01 RX ADMIN — IPRATROPIUM BROMIDE AND ALBUTEROL SULFATE 3 ML: .5; 3 SOLUTION RESPIRATORY (INHALATION) at 04:12

## 2017-01-01 RX ADMIN — POTASSIUM CHLORIDE 40 MEQ: 750 CAPSULE, EXTENDED RELEASE ORAL at 16:06

## 2017-01-01 RX ADMIN — BENZTROPINE MESYLATE 1 MG: 1 TABLET ORAL at 08:31

## 2017-01-01 RX ADMIN — MIDODRINE HYDROCHLORIDE 5 MG: 5 TABLET ORAL at 11:36

## 2017-01-01 RX ADMIN — IPRATROPIUM BROMIDE AND ALBUTEROL SULFATE 3 ML: .5; 3 SOLUTION RESPIRATORY (INHALATION) at 14:33

## 2017-01-01 RX ADMIN — IPRATROPIUM BROMIDE AND ALBUTEROL SULFATE 3 ML: .5; 3 SOLUTION RESPIRATORY (INHALATION) at 02:40

## 2017-01-01 RX ADMIN — IPRATROPIUM BROMIDE AND ALBUTEROL SULFATE 3 ML: .5; 3 SOLUTION RESPIRATORY (INHALATION) at 20:54

## 2017-01-01 RX ADMIN — DIVALPROEX SODIUM 1000 MG: 500 TABLET, DELAYED RELEASE ORAL at 20:45

## 2017-01-01 RX ADMIN — GLYCOPYRROLATE 0.4 MG: 0.2 INJECTION, SOLUTION INTRAMUSCULAR; INTRAVENOUS at 03:13

## 2017-01-01 RX ADMIN — GLYCOPYRROLATE 0.2 MG: 0.2 INJECTION, SOLUTION INTRAMUSCULAR; INTRAVENOUS at 11:17

## 2017-01-01 RX ADMIN — IPRATROPIUM BROMIDE AND ALBUTEROL SULFATE 3 ML: .5; 3 SOLUTION RESPIRATORY (INHALATION) at 08:33

## 2017-01-01 RX ADMIN — AMOXICILLIN AND CLAVULANATE POTASSIUM 1 TABLET: 875; 125 TABLET, FILM COATED ORAL at 08:31

## 2017-01-01 RX ADMIN — NYSTATIN 500000 UNITS: 100000 SUSPENSION ORAL at 17:10

## 2017-01-01 RX ADMIN — BENZTROPINE MESYLATE 1 MG: 1 TABLET ORAL at 09:12

## 2017-01-01 RX ADMIN — BUDESONIDE 0.5 MG: 0.25 INHALANT RESPIRATORY (INHALATION) at 19:40

## 2017-01-01 RX ADMIN — ENOXAPARIN SODIUM 40 MG: 40 INJECTION SUBCUTANEOUS at 09:11

## 2017-01-01 RX ADMIN — HYDROMORPHONE HYDROCHLORIDE 2 MG: 2 INJECTION, SOLUTION INTRAMUSCULAR; INTRAVENOUS; SUBCUTANEOUS at 21:37

## 2017-01-01 RX ADMIN — METHYLPREDNISOLONE SODIUM SUCCINATE 125 MG: 125 INJECTION, POWDER, FOR SOLUTION INTRAMUSCULAR; INTRAVENOUS at 19:43

## 2017-01-01 RX ADMIN — IPRATROPIUM BROMIDE AND ALBUTEROL SULFATE 3 ML: .5; 3 SOLUTION RESPIRATORY (INHALATION) at 03:05

## 2017-01-01 RX ADMIN — SERTRALINE 100 MG: 100 TABLET, FILM COATED ORAL at 09:11

## 2017-01-01 RX ADMIN — SODIUM CHLORIDE 2721 ML: 9 INJECTION, SOLUTION INTRAVENOUS at 18:32

## 2017-01-01 RX ADMIN — BUDESONIDE 0.5 MG: 0.5 INHALANT RESPIRATORY (INHALATION) at 08:49

## 2017-01-01 RX ADMIN — AMOXICILLIN AND CLAVULANATE POTASSIUM 1 TABLET: 875; 125 TABLET, FILM COATED ORAL at 21:32

## 2017-01-01 RX ADMIN — HYDROMORPHONE HYDROCHLORIDE 1 MG: 1 INJECTION, SOLUTION INTRAMUSCULAR; INTRAVENOUS; SUBCUTANEOUS at 08:42

## 2017-01-01 RX ADMIN — POTASSIUM CHLORIDE 40 MEQ: 1.5 POWDER, FOR SOLUTION ORAL at 19:17

## 2017-01-01 RX ADMIN — ONDANSETRON 4 MG: 2 INJECTION INTRAMUSCULAR; INTRAVENOUS at 21:19

## 2017-01-01 RX ADMIN — OSELTAMIVIR PHOSPHATE 75 MG: 6 POWDER, FOR SUSPENSION ORAL at 09:11

## 2017-01-01 RX ADMIN — BENZTROPINE MESYLATE 1 MG: 1 TABLET ORAL at 18:04

## 2017-01-01 RX ADMIN — BENZTROPINE MESYLATE 1 MG: 1 TABLET ORAL at 10:42

## 2017-01-01 RX ADMIN — IPRATROPIUM BROMIDE AND ALBUTEROL SULFATE 3 ML: .5; 3 SOLUTION RESPIRATORY (INHALATION) at 04:00

## 2017-01-01 RX ADMIN — IPRATROPIUM BROMIDE AND ALBUTEROL SULFATE 3 ML: .5; 3 SOLUTION RESPIRATORY (INHALATION) at 11:13

## 2017-01-01 RX ADMIN — ENOXAPARIN SODIUM 40 MG: 40 INJECTION SUBCUTANEOUS at 12:05

## 2017-01-01 RX ADMIN — NYSTATIN 500000 UNITS: 100000 SUSPENSION ORAL at 20:25

## 2017-01-01 RX ADMIN — ACETAMINOPHEN 650 MG: 325 TABLET ORAL at 22:04

## 2017-01-01 RX ADMIN — DIVALPROEX SODIUM 1000 MG: 500 TABLET, DELAYED RELEASE ORAL at 22:35

## 2017-01-01 RX ADMIN — ENOXAPARIN SODIUM 30 MG: 30 INJECTION SUBCUTANEOUS at 10:43

## 2017-01-01 RX ADMIN — IPRATROPIUM BROMIDE AND ALBUTEROL SULFATE 3 ML: .5; 3 SOLUTION RESPIRATORY (INHALATION) at 19:01

## 2017-01-01 RX ADMIN — IPRATROPIUM BROMIDE AND ALBUTEROL SULFATE 3 ML: .5; 3 SOLUTION RESPIRATORY (INHALATION) at 07:08

## 2017-01-01 RX ADMIN — IPRATROPIUM BROMIDE AND ALBUTEROL SULFATE 3 ML: .5; 3 SOLUTION RESPIRATORY (INHALATION) at 11:28

## 2017-01-01 RX ADMIN — IPRATROPIUM BROMIDE AND ALBUTEROL SULFATE 3 ML: .5; 3 SOLUTION RESPIRATORY (INHALATION) at 15:25

## 2017-01-01 RX ADMIN — POTASSIUM CHLORIDE 10 MEQ: 7.46 INJECTION, SOLUTION INTRAVENOUS at 00:00

## 2017-01-01 RX ADMIN — NYSTATIN 500000 UNITS: 100000 SUSPENSION ORAL at 07:45

## 2017-01-01 RX ADMIN — OSELTAMIVIR PHOSPHATE 75 MG: 6 POWDER, FOR SUSPENSION ORAL at 20:52

## 2017-01-01 RX ADMIN — ACETAMINOPHEN 650 MG: 325 TABLET ORAL at 01:21

## 2017-01-01 RX ADMIN — ARFORMOTEROL TARTRATE 15 MCG: 15 SOLUTION RESPIRATORY (INHALATION) at 21:18

## 2017-01-01 RX ADMIN — MIDODRINE HYDROCHLORIDE 5 MG: 5 TABLET ORAL at 21:32

## 2017-01-01 RX ADMIN — MIDODRINE HYDROCHLORIDE 5 MG: 5 TABLET ORAL at 16:41

## 2017-01-01 RX ADMIN — HYDROCODONE BITARTRATE AND ACETAMINOPHEN 1 TABLET: 5; 325 TABLET ORAL at 18:14

## 2017-01-01 RX ADMIN — BUDESONIDE 0.5 MG: 0.25 INHALANT RESPIRATORY (INHALATION) at 22:36

## 2017-01-01 RX ADMIN — LORAZEPAM 2 MG: 2 SOLUTION, CONCENTRATE ORAL at 17:31

## 2017-01-01 RX ADMIN — BENZTROPINE MESYLATE 1 MG: 1 TABLET ORAL at 17:30

## 2017-01-01 RX ADMIN — NYSTATIN 500000 UNITS: 100000 SUSPENSION ORAL at 11:22

## 2017-01-01 RX ADMIN — ENOXAPARIN SODIUM 40 MG: 40 INJECTION SUBCUTANEOUS at 21:00

## 2017-01-01 RX ADMIN — ACETAMINOPHEN 650 MG: 325 TABLET ORAL at 20:58

## 2017-01-01 RX ADMIN — IPRATROPIUM BROMIDE AND ALBUTEROL SULFATE 3 ML: .5; 3 SOLUTION RESPIRATORY (INHALATION) at 22:51

## 2017-01-01 RX ADMIN — IPRATROPIUM BROMIDE AND ALBUTEROL SULFATE 3 ML: .5; 3 SOLUTION RESPIRATORY (INHALATION) at 15:49

## 2017-01-01 RX ADMIN — IPRATROPIUM BROMIDE AND ALBUTEROL SULFATE 3 ML: .5; 3 SOLUTION RESPIRATORY (INHALATION) at 20:23

## 2017-01-01 RX ADMIN — MIDODRINE HYDROCHLORIDE 5 MG: 5 TABLET ORAL at 21:37

## 2017-01-01 RX ADMIN — OSELTAMIVIR PHOSPHATE 75 MG: 6 POWDER, FOR SUSPENSION ORAL at 23:20

## 2017-01-01 RX ADMIN — AMOXICILLIN AND CLAVULANATE POTASSIUM 1 TABLET: 875; 125 TABLET, FILM COATED ORAL at 10:43

## 2017-01-01 RX ADMIN — NYSTATIN 500000 UNITS: 100000 SUSPENSION ORAL at 10:32

## 2017-01-01 RX ADMIN — IPRATROPIUM BROMIDE AND ALBUTEROL SULFATE 3 ML: .5; 3 SOLUTION RESPIRATORY (INHALATION) at 03:45

## 2017-01-01 RX ADMIN — TAZOBACTAM SODIUM AND PIPERACILLIN SODIUM 3.38 G: 375; 3 INJECTION, SOLUTION INTRAVENOUS at 11:36

## 2017-01-01 RX ADMIN — ACETAMINOPHEN 650 MG: 325 TABLET ORAL at 20:28

## 2017-01-01 RX ADMIN — IPRATROPIUM BROMIDE AND ALBUTEROL SULFATE 3 ML: .5; 3 SOLUTION RESPIRATORY (INHALATION) at 16:17

## 2017-01-01 RX ADMIN — ARFORMOTEROL TARTRATE 15 MCG: 15 SOLUTION RESPIRATORY (INHALATION) at 09:04

## 2017-01-01 RX ADMIN — DEXTROSE AND SODIUM CHLORIDE 75 ML/HR: 5; 450 INJECTION, SOLUTION INTRAVENOUS at 06:18

## 2017-01-01 RX ADMIN — POTASSIUM CHLORIDE 40 MEQ: 1.5 POWDER, FOR SOLUTION ORAL at 15:02

## 2017-01-01 RX ADMIN — HYDROMORPHONE HYDROCHLORIDE 2 MG: 2 INJECTION, SOLUTION INTRAMUSCULAR; INTRAVENOUS; SUBCUTANEOUS at 09:36

## 2017-01-01 RX ADMIN — DEXTROSE AND SODIUM CHLORIDE 75 ML/HR: 5; 450 INJECTION, SOLUTION INTRAVENOUS at 16:44

## 2017-01-01 RX ADMIN — NYSTATIN 500000 UNITS: 100000 SUSPENSION ORAL at 09:22

## 2017-01-01 RX ADMIN — POTASSIUM CHLORIDE 40 MEQ: 750 CAPSULE, EXTENDED RELEASE ORAL at 11:47

## 2017-01-01 RX ADMIN — OSELTAMIVIR PHOSPHATE 75 MG: 6 POWDER, FOR SUSPENSION ORAL at 10:46

## 2017-01-01 RX ADMIN — IPRATROPIUM BROMIDE AND ALBUTEROL SULFATE 3 ML: .5; 3 SOLUTION RESPIRATORY (INHALATION) at 14:53

## 2017-01-01 RX ADMIN — DIVALPROEX SODIUM 1000 MG: 500 TABLET, DELAYED RELEASE ORAL at 20:21

## 2017-01-01 RX ADMIN — ENOXAPARIN SODIUM 40 MG: 40 INJECTION SUBCUTANEOUS at 08:00

## 2017-01-01 RX ADMIN — SERTRALINE 100 MG: 100 TABLET, FILM COATED ORAL at 08:31

## 2017-01-01 RX ADMIN — AMOXICILLIN AND CLAVULANATE POTASSIUM 1 TABLET: 875; 125 TABLET, FILM COATED ORAL at 22:08

## 2017-01-01 RX ADMIN — DIVALPROEX SODIUM 500 MG: 500 TABLET, DELAYED RELEASE ORAL at 10:32

## 2017-01-01 RX ADMIN — POTASSIUM CHLORIDE 10 MEQ: 7.46 INJECTION, SOLUTION INTRAVENOUS at 21:20

## 2017-01-01 RX ADMIN — NYSTATIN 500000 UNITS: 100000 SUSPENSION ORAL at 08:02

## 2017-01-01 RX ADMIN — GLYCOPYRROLATE 0.4 MG: 0.2 INJECTION, SOLUTION INTRAMUSCULAR; INTRAVENOUS at 19:26

## 2017-01-01 RX ADMIN — IPRATROPIUM BROMIDE AND ALBUTEROL SULFATE 3 ML: .5; 3 SOLUTION RESPIRATORY (INHALATION) at 19:47

## 2017-01-01 RX ADMIN — NYSTATIN 500000 UNITS: 100000 SUSPENSION ORAL at 12:29

## 2017-01-01 RX ADMIN — LORAZEPAM 2 MG: 2 INJECTION INTRAMUSCULAR; INTRAVENOUS at 15:24

## 2017-01-01 RX ADMIN — ARFORMOTEROL TARTRATE 15 MCG: 15 SOLUTION RESPIRATORY (INHALATION) at 08:49

## 2017-01-01 RX ADMIN — ENOXAPARIN SODIUM 30 MG: 30 INJECTION SUBCUTANEOUS at 08:38

## 2017-01-01 RX ADMIN — NYSTATIN 500000 UNITS: 100000 SUSPENSION ORAL at 20:46

## 2017-01-01 RX ADMIN — IPRATROPIUM BROMIDE AND ALBUTEROL SULFATE 3 ML: .5; 3 SOLUTION RESPIRATORY (INHALATION) at 07:46

## 2017-01-01 RX ADMIN — PANTOPRAZOLE SODIUM 40 MG: 40 TABLET, DELAYED RELEASE ORAL at 07:05

## 2017-01-01 RX ADMIN — IPRATROPIUM BROMIDE AND ALBUTEROL SULFATE 3 ML: .5; 3 SOLUTION RESPIRATORY (INHALATION) at 00:09

## 2017-01-01 RX ADMIN — GLYCOPYRROLATE 0.4 MG: 0.2 INJECTION, SOLUTION INTRAMUSCULAR; INTRAVENOUS at 15:43

## 2017-01-01 RX ADMIN — SERTRALINE 100 MG: 100 TABLET, FILM COATED ORAL at 08:14

## 2017-01-01 RX ADMIN — POTASSIUM CHLORIDE 10 MEQ: 7.46 INJECTION, SOLUTION INTRAVENOUS at 06:44

## 2017-01-01 RX ADMIN — IPRATROPIUM BROMIDE AND ALBUTEROL SULFATE 3 ML: .5; 3 SOLUTION RESPIRATORY (INHALATION) at 03:40

## 2017-01-01 RX ADMIN — CLOZAPINE 100 MG: 100 TABLET ORAL at 20:45

## 2017-01-01 RX ADMIN — NYSTATIN 500000 UNITS: 100000 SUSPENSION ORAL at 12:09

## 2017-01-01 RX ADMIN — BENZTROPINE MESYLATE 1 MG: 1 TABLET ORAL at 08:14

## 2017-01-01 RX ADMIN — IPRATROPIUM BROMIDE AND ALBUTEROL SULFATE 3 ML: .5; 3 SOLUTION RESPIRATORY (INHALATION) at 00:22

## 2017-01-01 RX ADMIN — IPRATROPIUM BROMIDE AND ALBUTEROL SULFATE 3 ML: .5; 3 SOLUTION RESPIRATORY (INHALATION) at 15:29

## 2017-01-01 RX ADMIN — LETROZOLE 2.5 MG: 2.5 TABLET ORAL at 14:09

## 2017-01-01 RX ADMIN — OSELTAMIVIR PHOSPHATE 75 MG: 6 POWDER, FOR SUSPENSION ORAL at 20:47

## 2017-01-01 RX ADMIN — NYSTATIN 500000 UNITS: 100000 SUSPENSION ORAL at 20:22

## 2017-01-01 RX ADMIN — PROMETHAZINE HYDROCHLORIDE 25 MG: 25 SUPPOSITORY RECTAL at 17:26

## 2017-01-01 RX ADMIN — ENOXAPARIN SODIUM 40 MG: 40 INJECTION SUBCUTANEOUS at 08:14

## 2017-01-01 RX ADMIN — IPRATROPIUM BROMIDE AND ALBUTEROL SULFATE 3 ML: .5; 3 SOLUTION RESPIRATORY (INHALATION) at 08:40

## 2017-01-01 RX ADMIN — NYSTATIN 500000 UNITS: 100000 SUSPENSION ORAL at 11:56

## 2017-01-01 RX ADMIN — MIDODRINE HYDROCHLORIDE 5 MG: 5 TABLET ORAL at 08:31

## 2017-01-01 RX ADMIN — SERTRALINE 100 MG: 100 TABLET, FILM COATED ORAL at 10:42

## 2017-01-01 RX ADMIN — HYDROMORPHONE HYDROCHLORIDE 2 MG: 2 INJECTION, SOLUTION INTRAMUSCULAR; INTRAVENOUS; SUBCUTANEOUS at 07:39

## 2017-01-01 RX ADMIN — LORAZEPAM 2 MG: 2 INJECTION INTRAMUSCULAR; INTRAVENOUS at 07:39

## 2017-01-01 RX ADMIN — LETROZOLE 2.5 MG: 2.5 TABLET ORAL at 11:36

## 2017-01-01 RX ADMIN — IPRATROPIUM BROMIDE AND ALBUTEROL SULFATE 3 ML: .5; 3 SOLUTION RESPIRATORY (INHALATION) at 08:29

## 2017-01-01 RX ADMIN — POTASSIUM CHLORIDE 40 MEQ: 750 CAPSULE, EXTENDED RELEASE ORAL at 22:08

## 2017-01-01 RX ADMIN — HYDROMORPHONE HYDROCHLORIDE 2 MG: 2 INJECTION, SOLUTION INTRAMUSCULAR; INTRAVENOUS; SUBCUTANEOUS at 23:43

## 2017-01-01 RX ADMIN — ENOXAPARIN SODIUM 40 MG: 40 INJECTION SUBCUTANEOUS at 08:02

## 2017-01-01 RX ADMIN — LEVOFLOXACIN 750 MG: 5 INJECTION, SOLUTION INTRAVENOUS at 21:01

## 2017-01-01 RX ADMIN — HYDROMORPHONE HYDROCHLORIDE 1 MG: 1 INJECTION, SOLUTION INTRAMUSCULAR; INTRAVENOUS; SUBCUTANEOUS at 04:41

## 2017-01-01 RX ADMIN — POTASSIUM CHLORIDE 40 MEQ: 1.5 POWDER, FOR SOLUTION ORAL at 21:22

## 2017-01-01 RX ADMIN — GLYCOPYRROLATE 0.4 MG: 0.2 INJECTION, SOLUTION INTRAMUSCULAR; INTRAVENOUS at 05:54

## 2017-01-01 RX ADMIN — BENZTROPINE MESYLATE 1 MG: 1 TABLET ORAL at 22:34

## 2017-01-01 RX ADMIN — SCOPOLAMINE 1 PATCH: 1 PATCH, EXTENDED RELEASE TRANSDERMAL at 15:42

## 2017-01-01 RX ADMIN — NYSTATIN 500000 UNITS: 100000 SUSPENSION ORAL at 21:22

## 2017-01-01 RX ADMIN — IPRATROPIUM BROMIDE AND ALBUTEROL SULFATE 3 ML: .5; 3 SOLUTION RESPIRATORY (INHALATION) at 07:42

## 2017-01-01 RX ADMIN — BUDESONIDE 0.5 MG: 0.25 INHALANT RESPIRATORY (INHALATION) at 08:45

## 2017-01-01 RX ADMIN — ARFORMOTEROL TARTRATE 15 MCG: 15 SOLUTION RESPIRATORY (INHALATION) at 22:33

## 2017-01-01 RX ADMIN — NYSTATIN 500000 UNITS: 100000 SUSPENSION ORAL at 22:09

## 2017-01-01 RX ADMIN — ATORVASTATIN CALCIUM 40 MG: 20 TABLET, FILM COATED ORAL at 10:42

## 2017-01-01 RX ADMIN — IPRATROPIUM BROMIDE AND ALBUTEROL SULFATE 3 ML: .5; 3 SOLUTION RESPIRATORY (INHALATION) at 07:20

## 2017-01-01 RX ADMIN — ENOXAPARIN SODIUM 40 MG: 40 INJECTION SUBCUTANEOUS at 10:20

## 2017-01-01 RX ADMIN — ENOXAPARIN SODIUM 40 MG: 40 INJECTION SUBCUTANEOUS at 09:19

## 2017-01-01 RX ADMIN — IPRATROPIUM BROMIDE AND ALBUTEROL SULFATE 3 ML: .5; 3 SOLUTION RESPIRATORY (INHALATION) at 23:28

## 2017-01-01 RX ADMIN — IPRATROPIUM BROMIDE AND ALBUTEROL SULFATE 3 ML: .5; 3 SOLUTION RESPIRATORY (INHALATION) at 23:38

## 2017-01-01 RX ADMIN — NYSTATIN 500000 UNITS: 100000 SUSPENSION ORAL at 13:25

## 2017-01-01 RX ADMIN — IPRATROPIUM BROMIDE AND ALBUTEROL SULFATE 3 ML: .5; 3 SOLUTION RESPIRATORY (INHALATION) at 12:23

## 2017-01-01 RX ADMIN — IPRATROPIUM BROMIDE AND ALBUTEROL SULFATE 3 ML: .5; 3 SOLUTION RESPIRATORY (INHALATION) at 23:58

## 2017-01-01 RX ADMIN — ENOXAPARIN SODIUM 30 MG: 30 INJECTION SUBCUTANEOUS at 10:13

## 2017-01-01 RX ADMIN — SODIUM CHLORIDE 100 ML/HR: 9 INJECTION, SOLUTION INTRAVENOUS at 00:19

## 2017-01-01 RX ADMIN — NYSTATIN 500000 UNITS: 100000 SUSPENSION ORAL at 17:33

## 2017-01-01 RX ADMIN — IPRATROPIUM BROMIDE AND ALBUTEROL SULFATE 3 ML: .5; 3 SOLUTION RESPIRATORY (INHALATION) at 10:54

## 2017-01-01 RX ADMIN — IPRATROPIUM BROMIDE AND ALBUTEROL SULFATE 3 ML: .5; 3 SOLUTION RESPIRATORY (INHALATION) at 11:46

## 2017-01-01 RX ADMIN — HYDROCODONE BITARTRATE AND ACETAMINOPHEN 1 TABLET: 5; 325 TABLET ORAL at 17:30

## 2017-01-01 RX ADMIN — NYSTATIN 500000 UNITS: 100000 SUSPENSION ORAL at 17:21

## 2017-01-01 RX ADMIN — IPRATROPIUM BROMIDE AND ALBUTEROL SULFATE 3 ML: .5; 3 SOLUTION RESPIRATORY (INHALATION) at 13:21

## 2017-01-01 RX ADMIN — FUROSEMIDE 40 MG: 10 INJECTION, SOLUTION INTRAMUSCULAR; INTRAVENOUS at 14:57

## 2017-01-01 RX ADMIN — LORAZEPAM 2 MG: 2 INJECTION INTRAMUSCULAR; INTRAVENOUS at 19:52

## 2017-01-01 RX ADMIN — PANTOPRAZOLE SODIUM 40 MG: 40 TABLET, DELAYED RELEASE ORAL at 06:07

## 2017-01-01 RX ADMIN — GLYCOPYRROLATE 0.4 MG: 0.2 INJECTION, SOLUTION INTRAMUSCULAR; INTRAVENOUS at 21:37

## 2017-01-01 RX ADMIN — GLYCOPYRROLATE 0.4 MG: 0.2 INJECTION, SOLUTION INTRAMUSCULAR; INTRAVENOUS at 09:36

## 2017-01-01 RX ADMIN — HYDROMORPHONE HYDROCHLORIDE 2 MG: 2 INJECTION, SOLUTION INTRAMUSCULAR; INTRAVENOUS; SUBCUTANEOUS at 05:54

## 2017-01-01 RX ADMIN — ATORVASTATIN CALCIUM 40 MG: 20 TABLET, FILM COATED ORAL at 11:36

## 2017-01-01 RX ADMIN — IPRATROPIUM BROMIDE AND ALBUTEROL SULFATE 3 ML: .5; 3 SOLUTION RESPIRATORY (INHALATION) at 19:56

## 2017-01-01 RX ADMIN — DIVALPROEX SODIUM 1000 MG: 500 TABLET, DELAYED RELEASE ORAL at 22:08

## 2017-01-01 RX ADMIN — ENOXAPARIN SODIUM 30 MG: 30 INJECTION SUBCUTANEOUS at 08:55

## 2017-01-01 RX ADMIN — IPRATROPIUM BROMIDE AND ALBUTEROL SULFATE 3 ML: .5; 3 SOLUTION RESPIRATORY (INHALATION) at 06:59

## 2017-01-01 RX ADMIN — HYDROMORPHONE HYDROCHLORIDE 1 MG: 1 INJECTION, SOLUTION INTRAMUSCULAR; INTRAVENOUS; SUBCUTANEOUS at 19:52

## 2017-01-01 RX ADMIN — HYDROMORPHONE HYDROCHLORIDE 1 MG: 1 INJECTION, SOLUTION INTRAMUSCULAR; INTRAVENOUS; SUBCUTANEOUS at 15:24

## 2017-01-01 RX ADMIN — IPRATROPIUM BROMIDE AND ALBUTEROL SULFATE 3 ML: .5; 3 SOLUTION RESPIRATORY (INHALATION) at 19:31

## 2017-01-01 RX ADMIN — OSELTAMIVIR PHOSPHATE 75 MG: 6 POWDER, FOR SUSPENSION ORAL at 20:17

## 2017-01-01 RX ADMIN — NYSTATIN 500000 UNITS: 100000 SUSPENSION ORAL at 21:11

## 2017-01-01 RX ADMIN — NYSTATIN 500000 UNITS: 100000 SUSPENSION ORAL at 19:17

## 2017-01-01 RX ADMIN — IPRATROPIUM BROMIDE AND ALBUTEROL SULFATE 3 ML: .5; 3 SOLUTION RESPIRATORY (INHALATION) at 11:48

## 2017-01-01 RX ADMIN — NYSTATIN 500000 UNITS: 100000 SUSPENSION ORAL at 10:20

## 2017-01-01 RX ADMIN — LORAZEPAM 2 MG: 2 INJECTION INTRAMUSCULAR; INTRAVENOUS at 08:42

## 2017-01-01 RX ADMIN — DIVALPROEX SODIUM 500 MG: 500 TABLET, DELAYED RELEASE ORAL at 08:14

## 2017-01-01 RX ADMIN — HYDROMORPHONE HYDROCHLORIDE 2 MG: 2 INJECTION, SOLUTION INTRAMUSCULAR; INTRAVENOUS; SUBCUTANEOUS at 17:20

## 2017-01-01 RX ADMIN — NYSTATIN 500000 UNITS: 100000 SUSPENSION ORAL at 17:15

## 2017-01-01 RX ADMIN — IPRATROPIUM BROMIDE AND ALBUTEROL SULFATE 3 ML: .5; 3 SOLUTION RESPIRATORY (INHALATION) at 23:30

## 2017-01-01 RX ADMIN — IPRATROPIUM BROMIDE AND ALBUTEROL SULFATE 3 ML: .5; 3 SOLUTION RESPIRATORY (INHALATION) at 07:03

## 2017-01-01 RX ADMIN — BUDESONIDE 0.25 MG: 0.25 INHALANT RESPIRATORY (INHALATION) at 07:07

## 2017-01-01 RX ADMIN — CLOZAPINE 100 MG: 100 TABLET ORAL at 22:35

## 2017-01-01 RX ADMIN — POTASSIUM CHLORIDE 10 MEQ: 7.46 INJECTION, SOLUTION INTRAVENOUS at 02:23

## 2017-01-01 RX ADMIN — HYDROMORPHONE HYDROCHLORIDE 2 MG: 2 INJECTION, SOLUTION INTRAMUSCULAR; INTRAVENOUS; SUBCUTANEOUS at 19:26

## 2017-01-01 RX ADMIN — IPRATROPIUM BROMIDE AND ALBUTEROL SULFATE 3 ML: .5; 3 SOLUTION RESPIRATORY (INHALATION) at 20:12

## 2017-01-01 RX ADMIN — ASPIRIN 81 MG: 81 TABLET, CHEWABLE ORAL at 08:31

## 2017-01-01 RX ADMIN — NYSTATIN 500000 UNITS: 100000 SUSPENSION ORAL at 22:34

## 2017-01-01 RX ADMIN — DIVALPROEX SODIUM 500 MG: 500 TABLET, DELAYED RELEASE ORAL at 09:11

## 2017-01-01 RX ADMIN — IPRATROPIUM BROMIDE AND ALBUTEROL SULFATE 3 ML: .5; 3 SOLUTION RESPIRATORY (INHALATION) at 06:49

## 2017-01-01 RX ADMIN — LORAZEPAM 2 MG: 2 INJECTION INTRAMUSCULAR; INTRAVENOUS at 23:44

## 2017-01-01 RX ADMIN — NYSTATIN 500000 UNITS: 100000 SUSPENSION ORAL at 18:23

## 2017-01-01 RX ADMIN — LEVOTHYROXINE SODIUM 112 MCG: 112 TABLET ORAL at 07:05

## 2017-01-01 RX ADMIN — NYSTATIN 500000 UNITS: 100000 SUSPENSION ORAL at 08:13

## 2017-01-01 RX ADMIN — OSELTAMIVIR PHOSPHATE 75 MG: 6 POWDER, FOR SUSPENSION ORAL at 21:22

## 2017-01-01 RX ADMIN — VANCOMYCIN HYDROCHLORIDE 1250 MG: 10 INJECTION, POWDER, LYOPHILIZED, FOR SOLUTION INTRAVENOUS at 21:37

## 2017-01-01 RX ADMIN — LEVOFLOXACIN 500 MG: 500 INJECTION, SOLUTION INTRAVENOUS at 18:17

## 2017-01-01 RX ADMIN — ENOXAPARIN SODIUM 40 MG: 40 INJECTION SUBCUTANEOUS at 09:23

## 2017-01-01 RX ADMIN — SERTRALINE 100 MG: 100 TABLET, FILM COATED ORAL at 11:36

## 2017-01-01 RX ADMIN — FUROSEMIDE 20 MG: 10 INJECTION, SOLUTION INTRAMUSCULAR; INTRAVENOUS at 14:09

## 2017-01-01 RX ADMIN — ENOXAPARIN SODIUM 40 MG: 40 INJECTION SUBCUTANEOUS at 10:32

## 2017-01-01 RX ADMIN — IPRATROPIUM BROMIDE AND ALBUTEROL SULFATE 3 ML: .5; 3 SOLUTION RESPIRATORY (INHALATION) at 19:35

## 2017-01-01 RX ADMIN — TAZOBACTAM SODIUM AND PIPERACILLIN SODIUM 3.38 G: 375; 3 INJECTION, SOLUTION INTRAVENOUS at 17:31

## 2017-01-01 RX ADMIN — IPRATROPIUM BROMIDE AND ALBUTEROL SULFATE 3 ML: .5; 3 SOLUTION RESPIRATORY (INHALATION) at 15:26

## 2017-01-01 RX ADMIN — NICOTINE 1 PATCH: 21 PATCH, EXTENDED RELEASE TRANSDERMAL at 11:20

## 2017-01-01 RX ADMIN — IPRATROPIUM BROMIDE AND ALBUTEROL SULFATE 3 ML: .5; 3 SOLUTION RESPIRATORY (INHALATION) at 16:33

## 2017-01-01 RX ADMIN — IPRATROPIUM BROMIDE AND ALBUTEROL SULFATE 3 ML: .5; 3 SOLUTION RESPIRATORY (INHALATION) at 07:07

## 2017-01-01 RX ADMIN — ATORVASTATIN CALCIUM 40 MG: 20 TABLET, FILM COATED ORAL at 08:31

## 2017-01-01 RX ADMIN — ENOXAPARIN SODIUM 40 MG: 40 INJECTION SUBCUTANEOUS at 11:21

## 2017-01-01 RX ADMIN — LETROZOLE 2.5 MG: 2.5 TABLET ORAL at 10:43

## 2017-01-01 RX ADMIN — SERTRALINE 100 MG: 100 TABLET, FILM COATED ORAL at 22:35

## 2017-01-01 RX ADMIN — SODIUM CHLORIDE 1000 ML: 9 INJECTION, SOLUTION INTRAVENOUS at 17:32

## 2017-01-01 RX ADMIN — IPRATROPIUM BROMIDE AND ALBUTEROL SULFATE 3 ML: .5; 3 SOLUTION RESPIRATORY (INHALATION) at 20:27

## 2017-01-01 RX ADMIN — LORAZEPAM 2 MG: 2 INJECTION INTRAMUSCULAR; INTRAVENOUS at 04:41

## 2017-01-01 RX ADMIN — IPRATROPIUM BROMIDE AND ALBUTEROL SULFATE 3 ML: .5; 3 SOLUTION RESPIRATORY (INHALATION) at 11:26

## 2017-01-01 RX ADMIN — IPRATROPIUM BROMIDE AND ALBUTEROL SULFATE 3 ML: .5; 3 SOLUTION RESPIRATORY (INHALATION) at 07:16

## 2017-01-01 RX ADMIN — BUDESONIDE 0.5 MG: 0.25 INHALANT RESPIRATORY (INHALATION) at 07:10

## 2017-01-01 RX ADMIN — MIDODRINE HYDROCHLORIDE 5 MG: 5 TABLET ORAL at 17:31

## 2017-01-01 RX ADMIN — LORAZEPAM 2 MG: 2 INJECTION INTRAMUSCULAR; INTRAVENOUS at 11:17

## 2017-01-01 RX ADMIN — PROMETHAZINE HYDROCHLORIDE 25 MG: 25 SUPPOSITORY RECTAL at 09:36

## 2017-01-01 RX ADMIN — IPRATROPIUM BROMIDE AND ALBUTEROL SULFATE 3 ML: .5; 3 SOLUTION RESPIRATORY (INHALATION) at 11:17

## 2017-01-01 RX ADMIN — LETROZOLE 2.5 MG: 2.5 TABLET ORAL at 08:31

## 2017-01-01 RX ADMIN — IPRATROPIUM BROMIDE AND ALBUTEROL SULFATE 3 ML: .5; 3 SOLUTION RESPIRATORY (INHALATION) at 15:56

## 2017-01-01 RX ADMIN — CLOZAPINE 100 MG: 100 TABLET ORAL at 22:09

## 2017-01-01 RX ADMIN — IPRATROPIUM BROMIDE AND ALBUTEROL SULFATE 3 ML: .5; 3 SOLUTION RESPIRATORY (INHALATION) at 07:10

## 2017-01-01 RX ADMIN — IPRATROPIUM BROMIDE AND ALBUTEROL SULFATE 3 ML: .5; 3 SOLUTION RESPIRATORY (INHALATION) at 19:15

## 2017-01-01 RX ADMIN — ENOXAPARIN SODIUM 40 MG: 40 INJECTION SUBCUTANEOUS at 10:40

## 2017-01-01 RX ADMIN — GLYCOPYRROLATE 0.4 MG: 0.2 INJECTION, SOLUTION INTRAMUSCULAR; INTRAVENOUS at 07:39

## 2017-01-01 RX ADMIN — BENZTROPINE MESYLATE 1 MG: 1 TABLET ORAL at 09:22

## 2017-01-01 RX ADMIN — IPRATROPIUM BROMIDE AND ALBUTEROL SULFATE 3 ML: .5; 3 SOLUTION RESPIRATORY (INHALATION) at 20:40

## 2017-01-01 RX ADMIN — DIVALPROEX SODIUM 500 MG: 500 TABLET, DELAYED RELEASE ORAL at 09:23

## 2017-01-01 RX ADMIN — IPRATROPIUM BROMIDE AND ALBUTEROL SULFATE 3 ML: .5; 3 SOLUTION RESPIRATORY (INHALATION) at 03:26

## 2017-01-01 RX ADMIN — HYDROCODONE BITARTRATE AND ACETAMINOPHEN 1 TABLET: 5; 325 TABLET ORAL at 04:27

## 2017-01-01 RX ADMIN — IPRATROPIUM BROMIDE AND ALBUTEROL SULFATE 3 ML: .5; 3 SOLUTION RESPIRATORY (INHALATION) at 08:31

## 2017-01-01 RX ADMIN — IPRATROPIUM BROMIDE AND ALBUTEROL SULFATE 3 ML: .5; 3 SOLUTION RESPIRATORY (INHALATION) at 11:58

## 2017-01-01 RX ADMIN — IPRATROPIUM BROMIDE AND ALBUTEROL SULFATE 3 ML: .5; 3 SOLUTION RESPIRATORY (INHALATION) at 19:19

## 2017-01-01 RX ADMIN — OSELTAMIVIR PHOSPHATE 75 MG: 6 POWDER, FOR SUSPENSION ORAL at 10:20

## 2017-01-01 RX ADMIN — ENOXAPARIN SODIUM 40 MG: 40 INJECTION SUBCUTANEOUS at 10:16

## 2017-01-01 RX ADMIN — IPRATROPIUM BROMIDE AND ALBUTEROL SULFATE 3 ML: .5; 3 SOLUTION RESPIRATORY (INHALATION) at 16:58

## 2017-01-01 RX ADMIN — ACETAMINOPHEN 650 MG: 650 SUPPOSITORY RECTAL at 17:29

## 2017-01-01 RX ADMIN — NYSTATIN 500000 UNITS: 100000 SUSPENSION ORAL at 20:56

## 2017-01-01 RX ADMIN — VANCOMYCIN HYDROCHLORIDE 1250 MG: 10 INJECTION, POWDER, LYOPHILIZED, FOR SOLUTION INTRAVENOUS at 06:07

## 2017-01-01 RX ADMIN — IPRATROPIUM BROMIDE AND ALBUTEROL SULFATE 3 ML: .5; 3 SOLUTION RESPIRATORY (INHALATION) at 00:07

## 2017-01-01 RX ADMIN — AMOXICILLIN AND CLAVULANATE POTASSIUM 1 TABLET: 875; 125 TABLET, FILM COATED ORAL at 16:06

## 2017-01-01 RX ADMIN — ASPIRIN 81 MG: 81 TABLET, CHEWABLE ORAL at 10:42

## 2017-01-01 RX ADMIN — IPRATROPIUM BROMIDE AND ALBUTEROL SULFATE 3 ML: .5; 3 SOLUTION RESPIRATORY (INHALATION) at 23:31

## 2017-01-01 RX ADMIN — PANTOPRAZOLE SODIUM 40 MG: 40 TABLET, DELAYED RELEASE ORAL at 07:19

## 2017-01-01 RX ADMIN — POTASSIUM CHLORIDE 40 MEQ: 1.5 POWDER, FOR SOLUTION ORAL at 17:10

## 2017-01-01 RX ADMIN — POTASSIUM CHLORIDE 10 MEQ: 7.46 INJECTION, SOLUTION INTRAVENOUS at 04:41

## 2017-01-01 RX ADMIN — BUDESONIDE 0.5 MG: 0.25 INHALANT RESPIRATORY (INHALATION) at 21:18

## 2017-01-01 RX ADMIN — IPRATROPIUM BROMIDE AND ALBUTEROL SULFATE 3 ML: .5; 3 SOLUTION RESPIRATORY (INHALATION) at 11:03

## 2017-01-01 RX ADMIN — NYSTATIN 500000 UNITS: 100000 SUSPENSION ORAL at 17:34

## 2017-01-01 RX ADMIN — LEVOTHYROXINE SODIUM 112 MCG: 112 TABLET ORAL at 07:19

## 2017-01-01 RX ADMIN — POTASSIUM CHLORIDE 10 MEQ: 7.46 INJECTION, SOLUTION INTRAVENOUS at 22:42

## 2017-01-01 RX ADMIN — NYSTATIN 500000 UNITS: 100000 SUSPENSION ORAL at 09:11

## 2017-01-01 RX ADMIN — NYSTATIN 500000 UNITS: 100000 SUSPENSION ORAL at 12:21

## 2017-01-01 RX ADMIN — HYDROMORPHONE HYDROCHLORIDE 1 MG: 1 INJECTION, SOLUTION INTRAMUSCULAR; INTRAVENOUS; SUBCUTANEOUS at 11:17

## 2017-01-01 RX ADMIN — NICOTINE 1 PATCH: 21 PATCH, EXTENDED RELEASE TRANSDERMAL at 12:02

## 2017-01-01 RX ADMIN — POTASSIUM CHLORIDE AND SODIUM CHLORIDE 100 ML/HR: 900; 300 INJECTION, SOLUTION INTRAVENOUS at 01:17

## 2017-01-01 RX ADMIN — ACETAMINOPHEN 650 MG: 325 TABLET ORAL at 04:00

## 2017-01-01 RX ADMIN — ALBUTEROL SULFATE 2.5 MG: 2.5 SOLUTION RESPIRATORY (INHALATION) at 20:08

## 2017-01-01 RX ADMIN — IPRATROPIUM BROMIDE AND ALBUTEROL SULFATE 3 ML: .5; 3 SOLUTION RESPIRATORY (INHALATION) at 12:22

## 2017-01-01 RX ADMIN — OSELTAMIVIR PHOSPHATE 75 MG: 6 POWDER, FOR SUSPENSION ORAL at 08:03

## 2017-01-01 RX ADMIN — IPRATROPIUM BROMIDE AND ALBUTEROL SULFATE 3 ML: .5; 3 SOLUTION RESPIRATORY (INHALATION) at 11:25

## 2017-01-27 NOTE — MR AVS SNAPSHOT
Bharti Roche   1/27/2017 10:40 AM   Office Visit    Dept Phone:  [phone]   Encounter #:  [id number]    Provider:  James Epley, APRN   Department:  [facility]                Your Full Care Plan              Today's Medication Changes          These changes are accurate as of: 1/27/17 11:46 AM.  If you have any questions, ask your nurse or doctor.               New Medication(s)Ordered:     mupirocin 2 % ointment   Commonly known as:  BACTROBAN   Apply  topically 2 (Two) Times a Day. Small amount to sores around mouth, until cleared   Started by:  James Epley, APRN            Where to Get Your Medications      These medications were sent to [facility] - [place], [place] - [address] [facility] - [phone]  - [phone]   [address] [facility] [address]     Phone:  [phone]     mupirocin 2 % ointment                  Your Updated Medication List          This list is accurate as of: 1/27/17 11:46 AM.  Always use your most recent med list.                * albuterol 108 (90 BASE) MCG/ACT inhaler   Commonly known as:  PROVENTIL HFA;VENTOLIN HFA       * albuterol (2.5 MG/3ML) 0.083% nebulizer solution   Commonly known as:  PROVENTIL       alendronate 10 MG tablet   Commonly known as:  FOSAMAX   TAKE THREE TABLETS BY MOUTH EVERY WEDNESDAY ON EMPTY STOMACH; MUST SIT UP RIGHT FOR 30 MINUTES       anastrozole 1 MG tablet   Commonly known as:  ARIMIDEX   TAKE 1 TABLET BY MOUTH DAILY       aspirin 81 MG chewable tablet   CHEW ONE TABLET BY MOUTH DAILY       atorvastatin 40 MG tablet   Commonly known as:  LIPITOR   TAKE ONE TABLET BY MOUTH DAILY AS DIRECTED FOR HIGH CHOLESTEROL       benztropine 0.5 MG tablet   Commonly known as:  COGENTIN       budesonide-formoterol 160-4.5 MCG/ACT inhaler   Commonly known as:  SYMBICORT       calcium carbonate 600 MG tablet   Commonly known as:  OS-KATHLEEN   TAKE 1 TABLET BY MOUTH DAILY       cholecalciferol 1000 UNITS tablet   Commonly known as:  VITAMIN D3   TAKE ONE TABLET BY MOUTH DAILY       clobetasol propionate 0.05 % shampoo   Commonly known as:  CLOBEX       * cloZAPine 100 MG tablet   Commonly known as:  CLOZARIL       * cloZAPine 50 MG tablet   Commonly known as:  CLOZARIL       diphenhydrAMINE 25 MG tablet   Commonly known as:  BENADRYL       divalproex 500 MG DR tablet   Commonly known as:  DEPAKOTE       docusate sodium 100 MG capsule   Commonly known as:  COLACE   TAKE ONE CAPSULE BY MOUTH TWICE A DAY AS NEEDED       ferrous sulfate 325 (65 FE) MG tablet       ipratropium-albuterol 0.5-2.5 mg/mL nebulizer   Commonly known as:  DUO-NEB       ketotifen 0.025 % ophthalmic solution   Commonly known as:  ZADITOR   Administer 1 drop to both eyes 2 (two) times a day. As needed eye allergies, itchy irritated eyes       levothyroxine 112 MCG tablet   Commonly known as:  SYNTHROID, LEVOTHROID   TAKE ONE TABLET BY MOUTH DAILY       loratadine 10 MG tablet   Commonly known as:  CLARITIN   TAKE 1 TABLET BY MOUTH DAILY       montelukast 10 MG tablet   Commonly known as:  SINGULAIR   TAKE ONE TABLET BY MOUTH EVERY NIGHT AT BEDTIME       mupirocin 2 % ointment   Commonly known as:  BACTROBAN   Apply  topically 2 (Two) Times a Day. Small amount to sores around mouth, until cleared       nicotine 21 MG/24HR patch   Commonly known as:  NICODERM CQ   Place 1 patch on the skin every day. Do not smoke with patch, call before running out for decreasing dose       pantoprazole 40 MG EC tablet   Commonly known as:  PROTONIX   TAKE ONE TABLET BY MOUTH DAILY       PERMETHRIN EX       QUEtiapine 50 MG tablet   Commonly known as:  SEROquel       risperiDONE 1 MG tablet   Commonly known as:  risperDAL       sertraline 100 MG tablet   Commonly known as:  ZOLOFT       triamcinolone 0.1 % cream   Commonly known as:  KENALOG   Apply  topically 2 (two) times a day. For dermatitis fingers use until improved Maximum use 12  "weeks avoid face       * Notice:  This list has 4 medication(s) that are the same as other medications prescribed for you. Read the directions carefully, and ask your doctor or other care provider to review them with you.            Instructions     None    Patient Instructions History      Upcoming Appointments     Visit Type Date Time Department    OFFICE VISIT 1/27/2017 10:40 AM RUSTY GARCIA      [facility] Signup     Our records indicate that your [facility] account has been deactivated. If you would like to reactivate your account, please email [email or web address] or call [phone] to talk to our [facility] staff.             Other Info from Your Visit           Allergies     Other        Reason for Visit     Mouth Lesions sore on the chin, and a area on her upper lip      Vital Signs     Blood Pressure Pulse Temperature Height Weight Oxygen Saturation    124/90 (BP Location: Left arm, Patient Position: Sitting, Cuff Size: Large Adult) 103 98.9 °F (37.2 °C) (Oral) 63\" (160 cm) 227 lb (103 kg) 94%    Body Mass Index Smoking Status                40.21 kg/m2 Current Every Day Smoker            "

## 2017-01-28 NOTE — PROGRESS NOTES
Subjective   Bharti Roche is a 61 y.o. female.     HPI Comments: Rash left upper lip right lower lip several weeks  No pain no itching no problem  Frequently picks      Continues to smoke 2 packs a day  History of recurrent aspiration pneumonia    Continues thickened liquid does pretty good not always constant reminder with staff    Does not want to quit smoking           The following portions of the patient's history were reviewed and updated as appropriate: allergies, past family history, past medical history, past social history, past surgical history and problem list.    Review of Systems   Constitutional: Negative.    Skin: Positive for rash.   All other systems reviewed and are negative.      Objective   Physical Exam   Constitutional: She is oriented to person, place, and time.   HENT:   Several small scabs left upper lip right lower lip  Mild    Nicotine stains upper lip moderate   Eyes: Pupils are equal, round, and reactive to light.   Pulmonary/Chest: Effort normal.   Neurological: She is alert and oriented to person, place, and time.   Psychiatric:   Flat affect  Pleasant  Patient's baseline   Vitals reviewed.      Assessment/Plan   Bharti was seen today for mouth lesions.    Diagnoses and all orders for this visit:    Dermatitis    Smoking  Comments:  2 packs per day    Dysphagia, unspecified type  Comments:  Recurrent aspiration pneumonia, intermittent compliance problems with thickened liquids    Other orders  -     mupirocin (BACTROBAN) 2 % ointment; Apply  topically 2 (Two) Times a Day. Small amount to sores around mouth, until cleared                  Counseled to quit smoking  Showed  patient a video, of what 20 cigarettes does to the lungs  Recheck if worsens or not improved routine follow-up obtained evidence of Pneumovax vaccines

## 2017-04-06 PROBLEM — A41.9 SEPSIS (HCC): Status: ACTIVE | Noted: 2017-01-01

## 2017-04-06 NOTE — ED NOTES
Patient transported to ED 9. Report given and care transferred to Adriana., RN     Kristy Mejia, KARTHIKEYAN  04/06/17 3675

## 2017-04-06 NOTE — ED PROVIDER NOTES
EMERGENCY DEPARTMENT ENCOUNTER    CHIEF COMPLAINT  Chief Complaint: SOA, AMS  History given by: patient, family  History limited by: NAHOMY  Room Number: 09/09  PMD: James Epley, APRN      HPI:  Pt is a 61 y.o. female who presents complaining of SOA per family. Pt's sister states she was told by caregivers at pts small group home pt was lethargic and had low O2 sats for unknown period of time by group home staff, and states that pt told her that she had fallen. Pt's sister states that pt not at her baseline and seems less responsive than usual. Per family pt has hx of aspiration pneumonia and uses O2 and inhalers at home PRN. Family states pt has hx of schizophrenia and lives in a limited group home where someone else controls her medications.    Pt c/o vomiting but denies SOA.    Duration:  unknown  Onset: unknown  Timing: constant  Location: generalized  Radiation: n/a  Quality: unknown  Intensity/Severity: unknown  Progression: unknown  Associated Symptoms: vomiting, lethargy, decreased responsiveness  Aggravating Factors: unknown  Alleviating Factors: unknown  Previous Episodes: none  Treatment before arrival: none    PAST MEDICAL HISTORY  Active Ambulatory Problems     Diagnosis Date Noted   • Atopic rhinitis 02/09/2016   • Anemia 02/09/2016   • Aspiration pneumonia 02/09/2016   • Chronic obstructive pulmonary disease 02/09/2016   • Dysphagia 02/09/2016   • Gastroesophageal reflux disease with esophagitis 02/09/2016   • Heart murmur 02/09/2016   • Hyperglycemia 02/09/2016   • Hyperlipidemia 02/09/2016   • Hypothyroidism 02/09/2016   • Pediculosis capitis 02/09/2016   • Nicotine dependence 02/09/2016   • Osteoporosis 02/09/2016   • Shortness of breath 02/09/2016   • Swallowing impaired 05/12/2016     Resolved Ambulatory Problems     Diagnosis Date Noted   • No Resolved Ambulatory Problems     Past Medical History:   Diagnosis Date   • Abdominal pain    • Abscess of chest wall    • Acute bronchitis with  bronchospasm    • Asthma    • Breast cancer    • Chronic bronchitis    • Cryptogenic organizing pneumonia    • Diabetes mellitus    • Esophageal reflux    • High blood cholesterol level    • Influenza    • Pneumonia        PAST SURGICAL HISTORY  Past Surgical History:   Procedure Laterality Date   • MASTECTOMY RADICAL Bilateral     Breast cancer   • OTHER SURGICAL HISTORY      breats surgery mastectomy       FAMILY HISTORY  Family History   Problem Relation Age of Onset   • Other Mother      malignant neoplasm   • Other Father      malignant neoplasm of prostate       SOCIAL HISTORY  Social History     Social History   • Marital status: Single     Spouse name: N/A   • Number of children: 2   • Years of education: N/A     Occupational History   • disabled      Social History Main Topics   • Smoking status: Current Every Day Smoker     Packs/day: 2.00     Types: Cigarettes   • Smokeless tobacco: Not on file      Comment: starting smoking again   • Alcohol use No   • Drug use: No   • Sexual activity: No     Other Topics Concern   • Not on file     Social History Narrative       ALLERGIES  Other    REVIEW OF SYSTEMS  Review of Systems   Unable to perform ROS: Mental status change   Constitutional: Positive for fatigue (per group home staff).   Respiratory: Positive for shortness of breath (per group home staff).    Gastrointestinal: Positive for vomiting.       PHYSICAL EXAM  ED Triage Vitals   Temp Heart Rate Resp BP SpO2   04/06/17 1718 04/06/17 1718 04/06/17 1718 04/06/17 1718 04/06/17 1718   100.7 °F (38.2 °C) 129 22 122/75 94 %      Temp src Heart Rate Source Patient Position BP Location FiO2 (%)   04/06/17 1718 -- -- -- --   Tympanic           Physical Exam   Constitutional: She appears distressed (mild).   Somnolent but rouses to voice.   HENT:   Head: Normocephalic and atraumatic.   Mouth/Throat: Mucous membranes are dry.   Eyes: EOM are normal. Pupils are equal, round, and reactive to light.   Neck: Normal  range of motion. Neck supple.   Cardiovascular: Regular rhythm, normal heart sounds and intact distal pulses.  Tachycardia present.    No murmur heard.  Pulses:       Posterior tibial pulses are 2+ on the right side, and 2+ on the left side.   HR in 110s   Pulmonary/Chest: Effort normal. Tachypnea noted. No respiratory distress. She has decreased breath sounds in the right lower field and the left lower field. She has rhonchi in the right lower field and the left lower field.   Abdominal: Soft. Bowel sounds are normal. There is no tenderness. There is no rebound and no guarding.   Musculoskeletal: Normal range of motion. She exhibits no edema.   Neurological: She is alert. She has normal sensation and intact cranial nerves. She displays weakness (generalized, nonfocal).   Oriented to self, not time, event, place   Skin: Skin is warm and dry. Abrasion (L anterior forehead and R knee) noted.   Psychiatric: Affect normal.   Nursing note and vitals reviewed.      LAB RESULTS  Lab Results (last 24 hours)     Procedure Component Value Units Date/Time    CBC & Differential [[id number]] Collected:  04/06/17 1749    Specimen:  Blood Updated:  04/06/17 1816    Narrative:       The following orders were created for panel order CBC & Differential.  Procedure                               Abnormality         Status                     ---------                               -----------         ------                     CBC Auto Differential[[id number]]         Abnormal            Final result                 Please view results for these tests on the individual orders.    Comprehensive Metabolic Panel [[id number]]  (Abnormal) Collected:  04/06/17 1749    Specimen:  Blood Updated:  04/06/17 1828     Glucose 116 (H) mg/dL      BUN 10 mg/dL      Creatinine 0.77 mg/dL      Sodium 142 mmol/L      Potassium 3.3 (L) mmol/L      Chloride 100 mmol/L      CO2 26.9 mmol/L      Calcium 9.0 mg/dL      Total Protein 7.6 g/dL      Albumin 3.60 g/dL       ALT (SGPT) 25 U/L      AST (SGOT) 45 (H) U/L      Alkaline Phosphatase 131 (H) U/L      Total Bilirubin 0.4 mg/dL      eGFR Non African Amer 77 mL/min/1.73      Globulin 4.0 gm/dL      A/G Ratio 0.9 g/dL      BUN/Creatinine Ratio 13.0     Anion Gap 15.1 mmol/L     Lactic Acid, Plasma [[id number]]  (Normal) Collected:  04/06/17 1749    Specimen:  Blood Updated:  04/06/17 1823     Lactate 1.6 mmol/L     CBC Auto Differential [[id number]]  (Abnormal) Collected:  04/06/17 1749    Specimen:  Blood Updated:  04/06/17 1816     WBC 6.67 10*3/mm3      RBC 4.20 10*6/mm3      Hemoglobin 11.5 (L) g/dL      Hematocrit 36.6 %      MCV 87.1 fL      MCH 27.4 pg      MCHC 31.4 (L) g/dL      RDW 17.6 (H) %      RDW-SD 55.5 (H) fl      MPV 10.3 fL      Platelets 124 (L) 10*3/mm3      Neutrophil % 76.8 (H) %      Lymphocyte % 17.8 (L) %      Monocyte % 5.2 %      Eosinophil % 0.1 (L) %      Basophil % 0.1 %      Immature Grans % 0.0 %      Neutrophils, Absolute 5.11 10*3/mm3      Lymphocytes, Absolute 1.19 10*3/mm3      Monocytes, Absolute 0.35 10*3/mm3      Eosinophils, Absolute 0.01 10*3/mm3      Basophils, Absolute 0.01 10*3/mm3      Immature Grans, Absolute 0.00 10*3/mm3     BNP [[id number]]  (Normal) Collected:  04/06/17 1749    Specimen:  Blood Updated:  04/06/17 1929     proBNP 157.5 pg/mL     Narrative:       Among patients with dyspnea, NT-proBNP is highly sensitive for the detection of acute congestive heart failure. In addition NT-proBNP of <300 pg/ml effectively rules out acute congestive heart failure with 99% negative predictive value.    Protime-INR [[id number]]  (Normal) Collected:  04/06/17 1749    Specimen:  Blood Updated:  04/06/17 1940     Protime 13.8 Seconds      INR 1.10    Troponin [[id number]]  (Normal) Collected:  04/06/17 1749    Specimen:  Blood Updated:  04/06/17 1929     Troponin T <0.010 ng/mL     Narrative:       Troponin T Reference Ranges:  Less than 0.03 ng/mL:    Negative for AMI  0.03 to 0.09 ng/mL:       Indeterminant for AMI  Greater than 0.09 ng/mL: Positive for AMI    Urinalysis With / Culture If Indicated [[id number]]  (Abnormal) Collected:  04/06/17 1752    Specimen:  Urine from Urine, Catheter Updated:  04/06/17 1818     Color, UA Dark Yellow (A)     Appearance, UA Cloudy (A)     pH, UA 7.0     Specific Gravity, UA 1.020     Glucose, UA Negative     Ketones, UA Trace (A)     Bilirubin, UA Negative     Blood, UA Negative     Protein, UA 30 mg/dL (1+) (A)     Leuk Esterase, UA Moderate (2+) (A)     Nitrite, UA Positive (A)     Urobilinogen, UA 1.0 E.U./dL    Urinalysis, Microscopic Only [[id number]]  (Abnormal) Collected:  04/06/17 1752    Specimen:  Urine from Urine, Catheter Updated:  04/06/17 1831     RBC, UA None Seen /HPF      WBC, UA 6-12 (A) /HPF      Bacteria, UA 1+ (A) /HPF      Squamous Epithelial Cells, UA 0-2 /HPF      Hyaline Casts, UA None Seen /LPF      Methodology Manual Light Microscopy    Urine Culture [[id number]] Collected:  04/06/17 1752    Specimen:  Urine from Urine, Catheter Updated:  04/06/17 1815    Blood Culture [[id number]] Collected:  04/06/17 1759    Specimen:  Blood from Arm, Left Updated:  04/06/17 1759    Blood Gas, Arterial [[id number]]  (Abnormal) Collected:  04/06/17 1932    Specimen:  Arterial Blood Updated:  04/06/17 1939     Site Arterial: right radial     Ayaan's Test Positive     pH, Arterial 7.411 pH units      pCO2, Arterial 43.0 mm Hg      pO2, Arterial 47.0 (C) mm Hg       Critical:Notify KARTHIKEYAN cardona RN (06-Apr-17 19:36:47)Read back ok        HCO3, Arterial 27.4 mmol/L      Base Excess, Arterial 2.4 (H) mmol/L      O2 Saturation Calculated 82.8 (L) %      Barometric Pressure for Blood Gas 745.5 mmHg      Modality Room air     Set Mech Resp Rate 18    Narrative:       sat 84 patient came back from CT without oxygen Meter: [id number] :         I ordered the above labs and reviewed the results      RADIOLOGY  XR Chest 1 View   Final Result   FINDINGS:  1. Morbid  obesity and AP supine portable technique limit evaluation.  2. Interstitial and vascular prominence probably chronic.  3. Right axillary dissection.  4. No focal airspace process, cannot rule out mild hydrostatic edema.   CT Cervical Spine Without Contrast   Final Result   No evidence of cervical spine fracture.       This report was finalized on 4/6/2017 7:45 PM by Dr. Kalpesh Pillai MD.          CT Head Without Contrast   Final Result   Unremarkable CT scan of the head without contrast.       This report was finalized on 4/6/2017 7:32 PM by Dr. Kalpesh Pillai MD.            I ordered the above noted radiological studies. Interpreted by radiologist. Reviewed by me in PACS.       PROCEDURES  Procedures  EKG           EKG time: 1757  Rhythm/Rate: Sinus tach rate in 110s  P waves and IA: normal  QRS, axis: normal   ST and T waves: non-specific ST findings diffuse     Interpreted Contemporaneously by me, independently viewed  ST findings more prominent compared to prior 8/19/15      PROGRESS AND CONSULTS  ED Course     1857  Ordered BNP, troponin, CXR, blood cultures, PT-INR, ABG, CT head, CT c-spine for further evaluation. Ordered albuterol and solu-medrol for SOA. Ordered IA tylenol for fever.    1954  Ordered flu swab for further evaluation. Ordered Zosyn and Levaquin for presumed pneumonia/UTI.    1958  Placed call to pulmonology to admit pt.  Rechecked pt who is sleeping and is 90% on 4L O2. D/w pt and family UTI, negative CT head and C-spine, and clinical signs of pneumonia despite no pneumonia seen on CXR and plan to admit pt for further evaluation and tx. Pt and family understand and agree with the plan. All questions answered.    2010  Call returned from Dr. Thornton, pulmonology, who agrees to admit pt to telemetry.    MEDICAL DECISION MAKING  Results were reviewed/discussed with the patient and they were also made aware of online access. Pt also made aware that some labs, such as cultures, will not be  resulted during ER visit and follow up with PMD is necessary.     MDM  Number of Diagnoses or Management Options  COPD exacerbation:   Fever, unspecified fever cause:   Hypoxia:   Sepsis, due to unspecified organism:   Urinary tract infection without hematuria, site unspecified:      Amount and/or Complexity of Data Reviewed  Clinical lab tests: ordered and reviewed (paO2 = 47.0)  Tests in the radiology section of CPT®: ordered and reviewed (CT head and c-spine are negative acute.)  Tests in the medicine section of CPT®: ordered and reviewed (See EKG procedure note.)  Decide to obtain previous medical records or to obtain history from someone other than the patient: yes  Obtain history from someone other than the patient: yes  Review and summarize past medical records: yes (Last admitted 02/2016 for pneumonia.)  Discuss the patient with other providers: yes (Dr. Thornton, pulmonology)  Independent visualization of images, tracings, or specimens: yes    Patient Progress  Patient progress: stable         DIAGNOSIS  Final diagnoses:   Sepsis, due to unspecified organism   Hypoxia   Urinary tract infection without hematuria, site unspecified   Fever, unspecified fever cause   COPD exacerbation       DISPOSITION  ADMISSION TO TELEMETRY    Discussed treatment plan and reason for admission with pt/family and admitting physician.  Pt/family voiced understanding of the plan for admission for further testing/treatment as needed.       Latest Documented Vital Signs:  As of 8:10 PM  BP- 105/73 HR- 74 Temp- 98.6 °F (37 °C) (Oral) O2 sat- 94%    --  Documentation assistance provided by gonzalo Crenshaw for Dr. Palacios.  Information recorded by the scribe was done at my direction and has been verified and validated by me.     Wilman Crenshaw  04/06/17 2012       Darby Palacios MD  04/07/17 6661

## 2017-04-06 NOTE — ED NOTES
EMS reports the patient fell today and has been experiencing SOA     Kristy Mejia RN  04/06/17 5652

## 2017-04-06 NOTE — ED NOTES
"Patient exhibiting AMS. Will not answer questions and all patient states is \"I fell\"     Kristy Mejia RN  04/06/17 1044    "

## 2017-04-06 NOTE — ED NOTES
EMS reports the patient's roommate stated the patient fell backwards onto her bottom.     Kristy Mejia RN  04/06/17 7148

## 2017-04-07 NOTE — PLAN OF CARE
Problem: Patient Care Overview (Adult)  Goal: Plan of Care Review  Outcome: Ongoing (interventions implemented as appropriate)    04/07/17 1520   Coping/Psychosocial Response Interventions   Plan Of Care Reviewed With patient

## 2017-04-07 NOTE — PLAN OF CARE
Problem: Inpatient SLP  Goal: Dysphagia- Patient will safely consume diet as per recommendation with no signs/symptoms of aspiration    04/07/17 1520   Safely Consume Diet   Safely Consume Diet- SLP, Date Established 04/07/17   Safely Consume Diet- SLP, Time to Achieve by discharge

## 2017-04-07 NOTE — THERAPY DISCHARGE NOTE
Acute Care - Speech Language Pathology   Swallow Eval/Discharge [facility]     Patient Name: Bharti Roche  : 1955  MRN: [id number]  Today's Date: 2017               Admit Date: 2017    SPEECH-LANGUAGE PATHOLOGY EVALUATION - SWALLOW  Subjective: The patient was seen on this date for a Clinical Swallow evaluation.  Patient was alert and cooperative.    The patient's history is significant for long history of dysphagia with last VFSS  showing silent aspiration of thin liquids recommending mech soft with nectar thick liquids.  Seen bedside 2016 with reported non-compliance with diet modifications.   Objective: Textures given included thin liquid, nectar thick liquid, puree consistency and mechanical soft consistency.  Pt stated dentures were not present in hospital.  She reported she normally wears them to eat, but sometimes would eat without them.  Assessment: Difficulties were noted with mixed consistency and thin liquid, characterized by inconsistent wet vocal quality.  Incomplete mastication of soft solids.  SLP Findings:  Patient presents with mild to moderate oropharyngeal dysphagia, without esophageal component.   Recommendations: Diet Textures: nectar thick liquid, puree with some mashed (fork mashed) consistency food.  Medications should be taken crushed with puree. May have water and ice between meals after oral care, under staff or family supervision and with the recommended strategies for safe swallowing.  Due to history of non-compliance, patient and sister may make decision for quality of life with diet per choice.  Recommended Strategies: Upright for PO, small bites and sips and no straw. Oral care before breakfast, after all meals and PRN.      Visit Dx:    ICD-10-CM ICD-9-CM   1. Sepsis, due to unspecified organism A41.9 038.9     995.91   2. Hypoxia R09.02 799.02   3. Urinary tract infection without hematuria, site unspecified N39.0 599.0   4. Fever, unspecified fever cause  R50.9 780.60   5. COPD exacerbation J44.1 491.21     Patient Active Problem List   Diagnosis   • Atopic rhinitis   • Anemia   • Aspiration pneumonia   • Chronic obstructive pulmonary disease   • Dysphagia   • Gastroesophageal reflux disease with esophagitis   • Heart murmur   • Hyperglycemia   • Hyperlipidemia   • Hypothyroidism   • Pediculosis capitis   • Nicotine dependence   • Osteoporosis   • Shortness of breath   • Swallowing impaired   • Sepsis     Past Medical History:   Diagnosis Date   • Abdominal pain    • Abscess of chest wall    • Acute bronchitis with bronchospasm    • Asthma    • Breast cancer    • Chronic bronchitis    • Cryptogenic organizing pneumonia    • Diabetes mellitus    • Esophageal reflux    • High blood cholesterol level     reported cholesterol level was high   • Influenza    • Pneumonia      Past Surgical History:   Procedure Laterality Date   • MASTECTOMY RADICAL Bilateral     Breast cancer   • OTHER SURGICAL HISTORY      breats surgery mastectomy          SWALLOW EVALUATION (last 72 hours)      Swallow Evaluation       04/07/17 1514                Rehab Evaluation    Document Type evaluation  -SA        Symptoms Noted During/After Treatment none  -SA        General Information    Patient Profile Review yes  -SA        Pertinent History Of Current Problem long h/o dysphagia, last VFSS 2014 w/ silent asp thin and mixed; last seen 1/2016 with noncompliance with modified diet  -SA        Current Diet Limitations NPO  -SA        Prior Level of Function- Swallowing diet modifications- liquid;diet modifications- foods;other (comment)   non-compliance  -SA        Plans/Goals Discussed With patient  -SA        Barriers to Rehab previous functional deficit  -SA        Clinical Impression    Patient's Goals For Discharge return to PO diet  -SA        SLP Swallowing Diagnosis mild dysphagia;moderate dysphagia  -SA        Criteria for Skilled Therapeutic Interventions Met no significant expected  improvement in functional status  -        Therapy Frequency evaluation only  -        Predicted Duration Therapy Interv (days) until discharge  -        SLP Diet Recommendation III - pureed with some mashed;nectar/syrup-thick liquids  -        Recommended Feeding/Eating Techniques maintain upright posture during/after eating for 30 mins;no straws;small sips/bites  -        SLP Rec. for Method of Medication Administration meds crushed in pudding/applesauce  -        Monitor For Signs Of Aspiration pneumonia;right lower lobe infiltrates  -        Anticipated Discharge Disposition [facility] ([facility])  -        Pain Assessment    Pain Assessment No/denies pain  -        Cognitive Assessment/Intervention    Current Cognitive/Communication Assessment impaired  -        Oral Motor Structure and Function    Oral Motor Anatomy and Physiology patient demonstrates anatomy that is WNL  -        Dentition Assessment other (see comments)   usually eats w/ dentures; dentures not present  -          User Key  (r) = Recorded By, (t) = Taken By, (c) = Cosigned By    Initials Name Effective Dates     Elizabeth Mckeon MS [facility]-SLP 04/13/15 -         EDUCATION  The patient has been educated in the following areas:   Dysphagia (Swallowing Impairment) Modified Diet Instruction.    SLP Recommendation and Plan  SLP Swallowing Diagnosis: mild dysphagia, moderate dysphagia  SLP Diet Recommendation: III - pureed with some mashed, nectar/syrup-thick liquids  Recommended Feeding/Eating Techniques: maintain upright posture during/after eating for 30 mins, no straws, small sips/bites  SLP Rec. for Method of Medication Administration: meds crushed in pudding/applesauce  Monitor For Signs Of Aspiration: pneumonia, right lower lobe infiltrates     Criteria for Skilled Therapeutic Interventions Met: no significant expected improvement in functional status  Anticipated Discharge Disposition:  [facility] ([facility])     Therapy Frequency: evaluation only             Plan of Care Review  Plan Of Care Reviewed With: patient          IP SLP Goals       04/07/17 1520          Safely Consume Diet    Safely Consume Diet- SLP, Date Established 04/07/17  -SA      Safely Consume Diet- SLP, Time to Achieve by discharge  -        User Key  (r) = Recorded By, (t) = Taken By, (c) = Cosigned By    Initials Name Provider Type    SA Elizabeth Mckeon MS CCC-SLP Speech and Language Pathologist             SLP Outcome Measures (last 72 hours)      SLP Outcome Measures       04/07/17 1500          SLP Outcome Measures    Outcome Measure Used? Adult [facility]      FCM Scores    Swallowing FCM Score 4  -        User Key  (r) = Recorded By, (t) = Taken By, (c) = Cosigned By    Initials Name Effective Dates    MS PASCUAL Hammer 04/13/15 -            Time Calculation:         Time Calculation- SLP       04/07/17 1522          Time Calculation- SLP    SLP Start Time 1425  -      SLP Stop Time 1525  -      SLP Time Calculation (min) 60 min  -        User Key  (r) = Recorded By, (t) = Taken By, (c) = Cosigned By    Initials Name Provider Type    SA Elizabeth Mckeon MS CCC-SLP Speech and Language Pathologist          Therapy Charges for Today     Code Description Service Date Service Provider Modifiers Qty    [id number] HC ST EVAL ORAL PHARYNG SWALLOW 4 4/7/2017 Elizabeth Mckeon MS CCC-SLP GN 1               SLP Discharge Summary  Anticipated Discharge Disposition: [facility] ([facility])    MS PASCUAL Kwong  4/7/2017

## 2017-04-07 NOTE — PROGRESS NOTES
Discharge Planning Assessment  [facility]     Patient Name: Bharti Roche  MRN: [id number]  Today's Date: 4/7/2017    Admit Date: 4/6/2017          Discharge Needs Assessment       04/07/17 1130    Living Environment    Lives With other (see comments)   Lives in group home    Living Arrangements apartment    Quality Of Family Relationships supportive   Has a sister Barrie Lyman but she does not know her phone number    Able to Return to Prior Living Arrangements yes    Discharge Needs Assessment    Concerns To Be Addressed denies needs/concerns at this time    Anticipated Changes Related to Illness none    Equipment Currently Used at Home oxygen   Says she uses O2 at night time.    Equipment Needed After Discharge none    Discharge Facility/Level Of Care Needs adult foster care/group home    Transportation Available car;family or friend will provide    Discharge Planning Comments [facility] ([address]) [phone]            Discharge Plan       04/07/17 1132    Case Management/Social Work Plan    Plan Return to group home    Patient/Family In Agreement With Plan yes    Additional Comments Spoke with patient at bedside.  Patient states she lives in an apartment with 2 other people - group home with [address]).  She states she has a sister (Barrie Lyman) but does not know her phone number.  Spoke with rep at [facility] ([phone]).  She states to call that number with any questions or concerns.  She states patient is ambulatory, takes her own medicines.  But liason with [facility] checks on patient and roommates at least twice a week. Patient uses O2 at night.  CCP will follow.        Discharge Placement     No information found                Demographic Summary       04/07/17 1112    Referral Information    Admission Type inpatient    Arrived From admitted as an inpatient;home or self-care    Referral Source admission list    Reason For Consult discharge  planning    Record Reviewed history and physical    Primary Care Physician Information    Name James Epley APRN            Functional Status       04/07/17 1115    Functional Status Current    Ambulation 2-->assistive person    Transferring 2-->assistive person    Toileting 2-->assistive person    Bathing 2-->assistive person    Dressing 2-->assistive person    Eating 0-->independent    Communication 0-->understands/communicates without difficulty    Change in Functional Status Since Onset of Current Illness/Injury yes    Functional Status Prior    Ambulation 0-->independent    Transferring 0-->independent    Toileting 0-->independent    Bathing 0-->independent    Dressing 0-->independent    Eating 0-->independent    Communication 0-->understands/communicates without difficulty    IADL    Medications independent    Meal Preparation assistive person    Housekeeping assistive person    Laundry assistive person    Shopping assistive person    Oral Care independent    Activity Tolerance    Current Activity Limitations none    Usual Activity Tolerance moderate    Current Activity Tolerance fair    Cognitive/Perceptual/Developmental    Current Mental Status/Cognitive Functioning recall memory is not intact    Recent Changes in Mental Status/Cognitive Functioning no changes            Psychosocial     None            Abuse/Neglect     None            Legal     None            Substance Abuse     None            Patient Forms     None          Becky S. [place], RN

## 2017-04-07 NOTE — PLAN OF CARE
Problem: Patient Care Overview (Adult)  Goal: Plan of Care Review  Outcome: Ongoing (interventions implemented as appropriate)    04/07/17 1728   Coping/Psychosocial Response Interventions   Plan Of Care Reviewed With patient;sibling   Patient Care Overview   Progress improving   Outcome Evaluation   Outcome Summary/Follow up Plan remains on 7 liters high flow. speech saw and placed on fork mash diet with ntl. meds to be crushed with applesauce. changed to po tamiflu. will start tonight. remains in droplet isolation. infection control stated okay to take out of contact isolation. resting comfortably.         04/07/17 1728   Coping/Psychosocial Response Interventions   Plan Of Care Reviewed With patient;sibling   Patient Care Overview   Progress improving   Outcome Evaluation   Outcome Summary/Follow up Plan remains on 7 liters high flow. speech saw and placed on fork mash diet with ntl. meds to be crushed with applesauce. changed to po tamiflu. will start tonight. remains in droplet isolation. infection control stated okay to take out of contact isolation. resting comfortably.         Problem: Fall Risk (Adult)  Goal: Identify Related Risk Factors and Signs and Symptoms  Outcome: Outcome(s) achieved Date Met:  04/07/17  Goal: Absence of Falls  Outcome: Ongoing (interventions implemented as appropriate)    Problem: Respiratory Insufficiency (Adult)  Goal: Identify Related Risk Factors and Signs and Symptoms  Outcome: Ongoing (interventions implemented as appropriate)  Goal: Acid/Base Balance  Outcome: Ongoing (interventions implemented as appropriate)  Goal: Effective Ventilation  Outcome: Ongoing (interventions implemented as appropriate)

## 2017-04-07 NOTE — PROGRESS NOTES
PROGRESS NOTE   LOS: 1 day   Patient Care Team:  James Epley, APRN as PCP - General  James Epley, APRN as PCP - Family Medicine    Chief Complaint: altered mental status    Interval History: Patient was admitted on 4/6/17 with altered mental status, fall, decreased O2 sats.  In the ER she was found to be tachycardic with heart rate up to 129 and febrile with a temp of 100.7.  She was also found to be hypoxic with a normal pH and PO2 of 47 and has a history of mixed obstructive and short of lung disease.  She has a history of aspiration and is on a dysphagia diet and has previously refused PEG tube placement.  She was found to have a urinary tract infection and was treated with Levaquin to cover UTI and aspiration.  Chest x-ray showed interstitial changes, but no obvious sign of pneumonia.  She did test positive for influenza B and was treated with Tamiflu.    She did sustain a fall prior to admission and reports that she hit her head, but did not lose consciousness.  CT of head and cervical spine were unremarkable.    REVIEW OF SYSTEMS:   CARDIOVASCULAR: No chest pain, chest pressure or chest discomfort. No palpitations or edema.   RESPIRATORY: Shortness of breath.  Cough with no sputum production.  GASTROINTESTINAL: No anorexia, nausea, vomiting or diarrhea. No abdominal pain or blood.   HEMATOLOGIC: No bleeding or bruising.   When I asked the patient about why she was in the hospital, she reported that she had fallen.  She reported that she had felt dizzy and nauseated prior to falling.  She denies losing any consciousness, but reports that she did hit her head.    Ventilator/Non-Invasive Ventilation Settings     None        Vital Signs  Temp:  [98.2 °F (36.8 °C)-100.7 °F (38.2 °C)] 98.2 °F (36.8 °C)  Heart Rate:  [] 75  Resp:  [18-25] 18  BP: ()/() 124/77  SpO2:  [84 %-94 %] 93 %  on  Flow (L/min):  [4-9] 9 O2 Device: high-flow nasal cannula    Intake/Output Summary (Last 24 hours) at 04/07/17  "1343  Last data filed at 04/07/17 0117   Gross per 24 hour   Intake             1000 ml   Output              200 ml   Net              800 ml     Flowsheet Rows         First Filed Value    Admission Height  68\" (172.7 cm) Documented at 04/06/2017 1718    Admission Weight  200 lb (90.7 kg) Documented at 04/06/2017 1718        Last 3 weights    04/06/17  1718 04/06/17  2336   Weight: 200 lb (90.7 kg) 214 lb 14.4 oz (97.5 kg)       Physical Exam:  GEN:  No acute distress, alert and oriented ×3, cooperative, well developed on oxygen per nasal cannula  EYES:   Sclera clear. No icterus. PERRL. Normal EOM, dried blood on upper lip  ENT:   External ears/nose normal, no oral lesions, no thrush, mucous membranes moist  NECK:  Supple, midline trachea, no JVD  LUNGS: Normal chest on inspection,  no wheezes.  Diminished breath sounds with bilateral rhonchi. No crackles. Respirations regular, even and unlabored.   CV:  Regular rhythm and rate. Normal S1/S2. No murmurs, gallops, or rubs noted.  ABD:  Soft, non-tender and non-distended. Normal bowel sounds. No guarding  EXT:  Moves all extremities well. No cyanosis. No redness. No edema.   Skin: dry, intact, no bleeding    Results Review:        Results from last 7 days  Lab Units 04/07/17  0639 04/06/17  1749   SODIUM mmol/L 146* 142   POTASSIUM mmol/L 4.1 3.3*   CHLORIDE mmol/L 109* 100   TOTAL CO2 mmol/L 25.5 26.9   BUN mg/dL 9 10   CREATININE mg/dL 0.50* 0.77   CALCIUM mg/dL 7.8* 9.0   AST (SGOT) U/L 27 45*   ALT (SGPT) U/L 17 25       Results from last 7 days  Lab Units 04/06/17  1749   TROPONIN T ng/mL <0.010       Results from last 7 days  Lab Units 04/07/17  0639 04/06/17  1749   WBC 10*3/mm3 8.85 6.67   HEMOGLOBIN g/dL 10.3* 11.5*   HEMATOCRIT % 33.5* 36.6   PLATELETS 10*3/mm3 111* 124*   NEUTROPHIL % % 83.1* 76.8*   LYMPHOCYTE % % 14.0* 17.8*   MONOCYTES % % 2.6* 5.2   EOSINOPHIL % % 0.0* 0.1*   BASOPHIL % % 0.0 0.1   IMM GRAN % % 0.3 0.0       Results from last 7 " days  Lab Units 04/06/17  1749   INR  1.10               Results from last 7 days  Lab Units 04/06/17  1932   PH, ARTERIAL pH units 7.411   PO2 ART mm Hg 47.0*   PCO2, ARTERIAL mm Hg 43.0   HCO3 ART mmol/L 27.4         No results found for: POCGLU    Results from last 7 days  Lab Units 04/06/17  1749   LACTATE mmol/L 1.6       Results from last 7 days  Lab Units 04/06/17  1749   PROBNP pg/mL 157.5       Results from last 7 days  Lab Units 04/06/17  1759 04/06/17  1752   BLOODCX  No growth at less than 24 hours  --    URINECX   --  >100,000 CFU/mL Gram Negative Bacilli*       Results from last 7 days  Lab Units 04/06/17  1752   NITRITE UA  Positive*   WBC UA /HPF 6-12*   BACTERIA UA /HPF 1+*   SQUAM EPITHEL UA /HPF 0-2   URINECX  >100,000 CFU/mL Gram Negative Bacilli*   Results for LISET GILLIAM CHAI (MRN [id number]) as of 4/7/2017 09:24   Ref. Range 8/20/2015 01:14   Strep pneumoniae Ag, URINE Unknown Negative                Imaging Results (last 24 hours)     Procedure Component Value Units Date/Time    CT Head Without Contrast [[id number]] Collected:  04/06/17 1929     Updated:  04/06/17 1935    Narrative:       CT SCAN OF THE HEAD WITHOUT CONTRAST     CLINICAL HISTORY: Patient fell. Confusion.     TECHNIQUE: Transverse 3 mm thick images were acquired from the base of  the skull to the vertex without IV contrast.     COMPARISON: CT scan of the head dated 06/17/2011.     FINDINGS: The brain and ventricular system appears structurally normal.  There is no evidence of recent or old intracranial hemorrhage or  infarction. There are no masses. Bone window images demonstrate no  evidence of skull fracture.       Impression:       Unremarkable CT scan of the head without contrast.     This report was finalized on 4/6/2017 7:32 PM by Dr. Kalpesh Pillai MD.       CT Cervical Spine Without Contrast [[id number]] Collected:  04/06/17 1940     Updated:  04/06/17 1948    Narrative:       CT SCAN OF THE CERVICAL SPINE WITHOUT  CONTRAST     CLINICAL HISTORY: Fell. Neck pain.     TECHNIQUE: Multiple axial 2 mm thick images were acquired through the  cervical spine. Coronal and sagittal reconstructions were produced.     COMPARISON: None     FINDINGS: All of the vertebral bodies are normal in height. The facet  joints appear intact. There is normal alignment. There is no evidence of  fracture or subluxation. Mild degenerative disc changes are noted at  several levels with anterior bony spurring. There are no obvious disc  herniations.       Impression:       No evidence of cervical spine fracture.     This report was finalized on 4/6/2017 7:45 PM by Dr. Kalpesh Pillai MD.       XR Chest 1 View [[id number]] Collected:  04/06/17 1949     Updated:  04/06/17 1954    Narrative:       PORTABLE CHEST SINGLE VIEW 19:07     HISTORY: 61-year-old obese female with COPD breast cancer diabetes  tobacco abuse     COMPARISON: 01/25/2016     FINDINGS:  1. Morbid obesity and AP supine portable technique limit evaluation.  2. Interstitial and vascular prominence probably chronic.  3. Right axillary dissection.  4. No focal airspace process, cannot rule out mild hydrostatic edema.     This report was finalized on 4/6/2017 7:51 PM by Dr. Marin Chery MD.             I reviewed the patient's new clinical results.  I personally viewed and interpreted the patient's imaging results:  CXR 4/6/17 compared to 8/19/15 shows increased interstitial markings.  No obvious sign of pneumonia, no cardiomegaly, effusion or pneumothorax.    Medication Review:     enoxaparin 40 mg Subcutaneous Daily   ipratropium-albuterol 3 mL Nebulization Q4H - RT   levoFLOXacin 500 mg Intravenous Q24H   oseltamivir 75 mg Oral Q12H         sodium chloride 0.9 % with KCl 40 mEq/L 100 mL/hr Last Rate: 100 mL/hr (04/07/17 0117)       ASSESSMENT:   1. Acute hypoxic respiratory failure  2. Acute urinary tract infection  3. Influenza B positive  4. Toxic metabolic encephalopathy  5. History of  aspiration  6. Mixed obstructive and restrictive lung disease  7. Dysphagia- was on a dysphagia diet and had previously refused PEG tube placement  8. Schizophrenia  9. Suspected obstructive sleep apnea  10. Hypokalemia-improved  11. Hypernatremia  12. Anemia    PLAN:  Continue IV Levaquin   monitor urine and blood cultures.  No sign of sepsis at this time  Continue bronchodilators  Encourage pulmonary toileting with TCDB  Lovenox for DVT prophylaxis  Await swallow evaluation by speech therapy- if patient fails bedside evaluation will place score track to administer medications.  Patient's mentation is greatly improved today and she may be a little too swallow better than her bedside evaluation done previously.  If she is able to take oral medications will switch Tamiflu to capsule.  Continue IV hydration until able to pass a swallow evaluation, but switch fluids to D5W with 0.45% saline   DNR/DNI  Allow ice chips otherwise NPO    Discussed with patient and staff    Disposition: Back to nursing home.  Will need to be set up a follow-up with pulmonary.    Adelina Andres MD  04/07/17  1:43 PM    EMR Dragon/Transcription disclaimer:   Much of this encounter note is an electronic transcription/translation of spoken language to printed text. The electronic translation of spoken language may permit erroneous, or at times, nonsensical words or phrases to be inadvertently transcribed; Although I have reviewed the note for such errors, some may still exist.

## 2017-04-07 NOTE — PAYOR COMM NOTE
"Liset Roche (61 y.o. Female)                                                                                               REQUEST FOR INPATIENT                                                           CONTACT=   AVILA BELCHER                                                           FAX   [phone]                                                            [phone]        Date of Birth Social Security Number Address Home Phone MRN    1955  [id number] [place], [place] [id number] [phone] [id number]    Mandaeism Marital Status          Anglican Single       Admission Date Admission Type Admitting Provider Attending Provider Department, Room/Bed    4/6/17 Emergency Malini Thornton MD Jambeih, Rami, MD [address], 613/1    Discharge Date Discharge Disposition Discharge Destination                      Attending Provider: Malini Thornton MD     Allergies:  No Known Allergies    Isolation:  Droplet   Infection:  MRSA/History Only (04/07/17), Influenza (04/06/17)   Code Status:  Conditional    Ht:  60.3\" (153.2 cm)   Wt:  214 lb 14.4 oz (97.5 kg)    Admission Cmt:  None   Principal Problem:  None                Active Insurance as of 4/6/2017     Primary Coverage     Payor Plan Insurance Group Employer/Plan Group    PASSPORT PASSPORT      Payor Plan Address Payor Plan Phone Number Effective From Effective To    [address] [phone] 1/1/1998     [place] [address]       Subscriber Name Subscriber Birth Date Member ID       TAWANALISET CHAI [id number]                 Emergency Contacts      (Rel.) Home Phone Work Phone Mobile Phone    [place], [place] -- -- [phone]    LymanBarrie (Sister) -- -- [phone]    Heather Leyva () -- -- [phone]            Insurance Information                PASSPORT/PASSPORT Phone: [phone]    Subscriber: Liset Roche Subscriber#: [id number]    Group#:  Precert#:            "   History & Physical      Malini Thornton MD at 4/6/2017  8:09 PM              Patient Care Team:  James Epley, APRN as PCP - General  James Epley, APRN as PCP - Family Medicine    Chief complaint:  Altered mental status    History of present illness:  This is a 61-year-old female patient, smoker, known to have severe schizophrenia, who lives in a group home. She was found poorly responsive today by a visiting nurse.   There is no report of overdose.  She had no prior complaints preceeding this event. She's currently;y somnolent and could not provide with history.  Her sister is at bedside.  She does not use Oxygen at home.   She was last hospitalized in January 2016 for viral pneumonia when she was found to have dysphagia and was noted to be noncompliant with her dysphagia diet.    Upon arrival to the ED, she was noted to be tachycardic with HR up to 129 and febrile at 100.7.     Labs:  K=3.3; Bicar=27; YAC=813; ABG=7.41/43/47; WBC=6.6; Hb=11.5; Venus=76    Review of Systems:  Cannot obtain due to altered mental status    History  Past Medical History:   Diagnosis Date   • Abdominal pain    • Abscess of chest wall    • Acute bronchitis with bronchospasm    • Asthma    • Breast cancer    • Chronic bronchitis    • Cryptogenic organizing pneumonia    • Diabetes mellitus    • Esophageal reflux    • High blood cholesterol level     reported cholesterol level was high   • Influenza    • Pneumonia      Past Surgical History:   Procedure Laterality Date   • MASTECTOMY RADICAL Bilateral     Breast cancer   • OTHER SURGICAL HISTORY      breats surgery mastectomy     Family History   Problem Relation Age of Onset   • Other Mother      malignant neoplasm   • Other Father      malignant neoplasm of prostate     Social History   Substance Use Topics   • Smoking status: Current Every Day Smoker     Packs/day: 2.00     Types: Cigarettes   • Smokeless tobacco: None      Comment: starting smoking again   • Alcohol use No       (Not in a  hospital admission)  Allergies:  Other    Objective     Vital Signs  Temp:  [100.7 °F (38.2 °C)] 100.7 °F (38.2 °C)  Heart Rate:  [104-129] 107  Resp:  [19-25] 19  BP: (105-137)/() 105/73    Physical Exam:  Constitutional: Not in acute distress.  Eyes: Injected conjunctiva, EOMI.  ENMT: Caicedo 3.  Large tongue  Heart: RRR, no murmur  Lungs: Bilateral rhonchi.  No wheezing or crackles       Abdomen: Obese. Soft. No tenderness or dullness.  Extremities: No cyanosis, clubbing or pitting edema. Moves all extremities.  Neuro: Somnolent, oriented to place but not to time   Integumentary: No rash  Lymphatic: No palpable cervical or supraclavicular lymph nodes.      Diagnostic imaging:  I personally and independently reviewed the following images:   CXR 4/6/17: Increased interstitial markings.       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     Malini Thornton MD  04/06/17  8:09 PM        EMR Dragon/Transcription disclaimer:   Much of this encounter note is an electronic transcription/translation of spoken language to printed text. The electronic translation of spoken language may permit erroneous, or at times, nonsensical words or phrases to be inadvertently transcribed; Although I have reviewed the note for such errors, some  "may still exist.      Electronically signed by Malini Thornton MD at 4/6/2017 10:24 PM           Emergency Department Notes      Kristy Mejia RN at 4/6/2017  5:17 PM          EMS reports the patient fell today and has been experiencing SOA     Kristy Mejia RN  04/06/17 1718       Electronically signed by Kristy Mejia RN at 4/6/2017  5:18 PM      Kristy Mejia RN at 4/6/2017  5:20 PM          Patient exhibiting AMS. Will not answer questions and all patient states is \"I fell\"     Kristy Mejia RN  04/06/17 1720       Electronically signed by Kristy Mejia RN at 4/6/2017  5:20 PM      Kristy Mejia RN at 4/6/2017  5:21 PM          EMS reports the patient's roommate stated the patient fell backwards onto her bottom.     Kristy Mejia RN  04/06/17 1721       Electronically signed by Kristy Mejia RN at 4/6/2017  5:21 PM      Kristy Mejia RN at 4/6/2017  5:25 PM          Patient transported to ED 9. Report given and care transferred to Adriana.KARTHIKEYAN RN  04/06/17 1726       Electronically signed by Kristy Mejia RN at 4/6/2017  5:26 PM        Vital Signs (last 24 hours)       04/06 0700  -  04/07 0659 04/07 0700  -  04/07 1438   Most Recent    Temp (°F) 98.2 -  (!)100.7      98.6     98.6 (37)    Heart Rate 75 -  (!)129    75 -  82     82    Resp 18 -  25      18     18    BP 98/58 -  144/96    124/77 -  147/88     147/88    SpO2 (%) (!)84 -  94    (!)89 -  93     93          Hospital Medications (active)       Dose Frequency Start End    acetaminophen (TYLENOL) suppository 650 mg 650 mg Once 4/6/2017 4/6/2017    Sig - Route: Insert 1 suppository into the rectum 1 (One) Time. - Rectal    albuterol (PROVENTIL) nebulizer solution 0.083% 2.5 mg/3mL 2.5 mg Every 15 Minutes 4/6/2017 4/6/2017    Sig - Route: Take 2.5 mg by nebulization Every 15 (Fifteen) Minutes. - Nebulization    dextrose 5 % and sodium chloride 0.45 % infusion 75 mL/hr Continuous 4/7/2017     Sig - Route: Infuse 75 mL/hr into a " venous catheter Continuous. - Intravenous    enoxaparin (LOVENOX) syringe 40 mg 40 mg Daily 4/6/2017     Sig - Route: Inject 0.4 mL under the skin Daily. - Subcutaneous    ipratropium-albuterol (DUO-NEB) nebulizer solution 3 mL 3 mL Once 4/6/2017 4/6/2017    Sig - Route: Take 3 mL by nebulization 1 (One) Time. - Nebulization    ipratropium-albuterol (DUO-NEB) nebulizer solution 3 mL 3 mL Every 4 Hours - RT 4/6/2017     Sig - Route: Take 3 mL by nebulization Every 4 (Four) Hours. - Nebulization    levoFLOXacin (LEVAQUIN) 500 mg/100 mL D5W (premix) (LEVAQUIN) 500 mg 500 mg Every 24 Hours 4/7/2017 4/11/2017    Sig - Route: Infuse 100 mL into a venous catheter Daily. - Intravenous    levoFLOXacin (LEVAQUIN) 750 mg/150 mL D5W (premix) (LEVAQUIN) 750 mg 750 mg Once 4/6/2017 4/6/2017    Sig - Route: Infuse 150 mL into a venous catheter 1 (One) Time. - Intravenous    methylPREDNISolone sodium succinate (SOLU-Medrol) injection 125 mg 125 mg Once 4/6/2017 4/6/2017    Sig - Route: Infuse 2 mL into a venous catheter 1 (One) Time. - Intravenous    oseltamivir (TAMIFLU) 6 MG/ML suspension 75 mg 75 mg Every 12 Hours Scheduled 4/7/2017 4/11/2017    Sig - Route: Take 12.5 mL by mouth Every 12 (Twelve) Hours. - Oral    piperacillin-tazobactam (ZOSYN) 4.5 g in iso-osmotic dextrose 100 mL IVPB (premix) 4.5 g Once 4/6/2017 4/6/2017    Sig - Route: Infuse 100 mL into a venous catheter 1 (One) Time. - Intravenous    sodium chloride 0.9 % bolus 2,721 mL 30 mL/kg × 90.7 kg Once 4/6/2017 4/6/2017    Sig - Route: Infuse 2,721 mL into a venous catheter 1 (One) Time. - Intravenous    sodium chloride 0.9 % flush 1-10 mL 1-10 mL As Needed 4/6/2017     Sig - Route: Infuse 1-10 mL into a venous catheter As Needed for Line Care. - Intravenous    sodium chloride 0.9 % flush 10 mL 10 mL As Needed 4/6/2017     Sig - Route: Infuse 10 mL into a venous catheter As Needed for Line Care. - Intravenous    Cosign for Ordering: Accepted by Darby Palacios MD on  4/6/2017  7:54 PM    sodium chloride 0.9 % with KCl 40 mEq/L infusion (Discontinued) 100 mL/hr Continuous 4/6/2017 4/7/2017    Sig - Route: Infuse 100 mL/hr into a venous catheter Continuous. - Intravenous            Lab Results (last 24 hours)     Procedure Component Value Units Date/Time    CBC & Differential [[id number]] Collected:  04/06/17 1749    Specimen:  Blood Updated:  04/06/17 1816    Narrative:       The following orders were created for panel order CBC & Differential.  Procedure                               Abnormality         Status                     ---------                               -----------         ------                     CBC Auto Differential[[id number]]         Abnormal            Final result                 Please view results for these tests on the individual orders.    CBC Auto Differential [[id number]]  (Abnormal) Collected:  04/06/17 1749    Specimen:  Blood Updated:  04/06/17 1816     WBC 6.67 10*3/mm3      RBC 4.20 10*6/mm3      Hemoglobin 11.5 (L) g/dL      Hematocrit 36.6 %      MCV 87.1 fL      MCH 27.4 pg      MCHC 31.4 (L) g/dL      RDW 17.6 (H) %      RDW-SD 55.5 (H) fl      MPV 10.3 fL      Platelets 124 (L) 10*3/mm3      Neutrophil % 76.8 (H) %      Lymphocyte % 17.8 (L) %      Monocyte % 5.2 %      Eosinophil % 0.1 (L) %      Basophil % 0.1 %      Immature Grans % 0.0 %      Neutrophils, Absolute 5.11 10*3/mm3      Lymphocytes, Absolute 1.19 10*3/mm3      Monocytes, Absolute 0.35 10*3/mm3      Eosinophils, Absolute 0.01 10*3/mm3      Basophils, Absolute 0.01 10*3/mm3      Immature Grans, Absolute 0.00 10*3/mm3     Urinalysis With / Culture If Indicated [[id number]]  (Abnormal) Collected:  04/06/17 1752    Specimen:  Urine from Urine, Catheter Updated:  04/06/17 1818     Color, UA Dark Yellow (A)     Appearance, UA Cloudy (A)     pH, UA 7.0     Specific Gravity, UA 1.020     Glucose, UA Negative     Ketones, UA Trace (A)     Bilirubin, UA Negative     Blood, UA Negative      Protein, UA 30 mg/dL (1+) (A)     Leuk Esterase, UA Moderate (2+) (A)     Nitrite, UA Positive (A)     Urobilinogen, UA 1.0 E.U./dL    Lactic Acid, Plasma [[id number]]  (Normal) Collected:  04/06/17 1749    Specimen:  Blood Updated:  04/06/17 1823     Lactate 1.6 mmol/L     Comprehensive Metabolic Panel [[id number]]  (Abnormal) Collected:  04/06/17 1749    Specimen:  Blood Updated:  04/06/17 1828     Glucose 116 (H) mg/dL      BUN 10 mg/dL      Creatinine 0.77 mg/dL      Sodium 142 mmol/L      Potassium 3.3 (L) mmol/L      Chloride 100 mmol/L      CO2 26.9 mmol/L      Calcium 9.0 mg/dL      Total Protein 7.6 g/dL      Albumin 3.60 g/dL      ALT (SGPT) 25 U/L      AST (SGOT) 45 (H) U/L      Alkaline Phosphatase 131 (H) U/L      Total Bilirubin 0.4 mg/dL      eGFR Non African Amer 77 mL/min/1.73      Globulin 4.0 gm/dL      A/G Ratio 0.9 g/dL      BUN/Creatinine Ratio 13.0     Anion Gap 15.1 mmol/L     Urinalysis, Microscopic Only [[id number]]  (Abnormal) Collected:  04/06/17 1752    Specimen:  Urine from Urine, Catheter Updated:  04/06/17 1831     RBC, UA None Seen /HPF      WBC, UA 6-12 (A) /HPF      Bacteria, UA 1+ (A) /HPF      Squamous Epithelial Cells, UA 0-2 /HPF      Hyaline Casts, UA None Seen /LPF      Methodology Manual Light Microscopy    BNP [[id number]]  (Normal) Collected:  04/06/17 1749    Specimen:  Blood Updated:  04/06/17 1929     proBNP 157.5 pg/mL     Narrative:       Among patients with dyspnea, NT-proBNP is highly sensitive for the detection of acute congestive heart failure. In addition NT-proBNP of <300 pg/ml effectively rules out acute congestive heart failure with 99% negative predictive value.    Troponin [[id number]]  (Normal) Collected:  04/06/17 1749    Specimen:  Blood Updated:  04/06/17 1929     Troponin T <0.010 ng/mL     Narrative:       Troponin T Reference Ranges:  Less than 0.03 ng/mL:    Negative for AMI  0.03 to 0.09 ng/mL:      Indeterminant for AMI  Greater than 0.09 ng/mL: Positive for  AMI    Blood Gas, Arterial [[id number]]  (Abnormal) Collected:  04/06/17 1932    Specimen:  Arterial Blood Updated:  04/06/17 1939     Site Arterial: right radial     Ayaan's Test Positive     pH, Arterial 7.411 pH units      pCO2, Arterial 43.0 mm Hg      pO2, Arterial 47.0 (C) mm Hg       Critical:Notify RN mekhi cardona RN (06-Apr-17 19:36:47)Read back ok        HCO3, Arterial 27.4 mmol/L      Base Excess, Arterial 2.4 (H) mmol/L      O2 Saturation Calculated 82.8 (L) %      Barometric Pressure for Blood Gas 745.5 mmHg      Modality Room air     Set Mech Resp Rate 18    Narrative:       sat 84 patient came back from CT without oxygen Meter: [id number] :     Protime-INR [[id number]]  (Normal) Collected:  04/06/17 1749    Specimen:  Blood Updated:  04/06/17 1940     Protime 13.8 Seconds      INR 1.10    Influenza Antigen [[id number]]  (Abnormal) Collected:  04/06/17 2103    Specimen:  Swab from Nasopharynx Updated:  04/06/17 2131     Influenza A Ag, EIA Negative     Influenza B Ag, EIA Positive (A)    Gold Top - SST [[id number]] Collected:  04/06/17 1749    Specimen:  Blood Updated:  04/06/17 2201     Extra Tube Hold for add-ons.      Auto resulted.       Light Blue Top [[id number]] Collected:  04/06/17 1749    Specimen:  Blood Updated:  04/06/17 2201     Extra Tube hold for add-on      Auto resulted       Green Top (Gel) [[id number]] Collected:  04/06/17 1749    Specimen:  Blood Updated:  04/06/17 2201     Extra Tube Hold for add-ons.      Auto resulted.       Lavender Top [[id number]] Collected:  04/06/17 1749    Specimen:  Blood Updated:  04/06/17 2201     Extra Tube hold for add-on      Auto resulted       Grants Draw [[id number]] Collected:  04/06/17 1749    Specimen:  Blood Updated:  04/06/17 2201    Narrative:       The following orders were created for panel order Grants Draw.  Procedure                               Abnormality         Status                     ---------                                -----------         ------                     Light Blue Top[[id number]]                                    Final result               Green Top (Gel)[[id number]]                                   Final result               Lavender Top[[id number]]                                      Final result               Gold Top - SST[[id number]]                                    Final result                 Please view results for these tests on the individual orders.    Blood Culture [[id number]]  (Normal) Collected:  04/06/17 1759    Specimen:  Blood from Arm, Left Updated:  04/07/17 0601     Blood Culture No growth at less than 24 hours    CBC & Differential [[id number]] Collected:  04/07/17 0639    Specimen:  Blood Updated:  04/07/17 0706    Narrative:       The following orders were created for panel order CBC & Differential.  Procedure                               Abnormality         Status                     ---------                               -----------         ------                     CBC Auto Differential[[id number]]         Abnormal            Final result                 Please view results for these tests on the individual orders.    CBC Auto Differential [[id number]]  (Abnormal) Collected:  04/07/17 0639    Specimen:  Blood Updated:  04/07/17 0706     WBC 8.85 10*3/mm3      RBC 3.79 (L) 10*6/mm3      Hemoglobin 10.3 (L) g/dL      Hematocrit 33.5 (L) %      MCV 88.4 fL      MCH 27.2 pg      MCHC 30.7 (L) g/dL      RDW 18.1 (H) %      RDW-SD 58.3 (H) fl      MPV 10.3 fL      Platelets 111 (L) 10*3/mm3      Neutrophil % 83.1 (H) %      Lymphocyte % 14.0 (L) %      Monocyte % 2.6 (L) %      Eosinophil % 0.0 (L) %      Basophil % 0.0 %      Immature Grans % 0.3 %      Neutrophils, Absolute 7.35 10*3/mm3      Lymphocytes, Absolute 1.24 10*3/mm3      Monocytes, Absolute 0.23 10*3/mm3      Eosinophils, Absolute 0.00 10*3/mm3      Basophils, Absolute 0.00 10*3/mm3      Immature Grans, Absolute 0.03 10*3/mm3      Comprehensive Metabolic Panel [[id number]]  (Abnormal) Collected:  04/07/17 0639    Specimen:  Blood Updated:  04/07/17 0734     Glucose 140 (H) mg/dL      BUN 9 mg/dL      Creatinine 0.50 (L) mg/dL      Sodium 146 (H) mmol/L      Potassium 4.1 mmol/L      Chloride 109 (H) mmol/L      CO2 25.5 mmol/L      Calcium 7.8 (L) mg/dL      Total Protein 6.7 g/dL      Albumin 3.20 (L) g/dL      ALT (SGPT) 17 U/L      AST (SGOT) 27 U/L      Alkaline Phosphatase 109 U/L      Total Bilirubin 0.3 mg/dL      eGFR Non African Amer 126 mL/min/1.73      Globulin 3.5 gm/dL      A/G Ratio 0.9 g/dL      BUN/Creatinine Ratio 18.0     Anion Gap 11.5 mmol/L     Urine Culture [[id number]]  (Abnormal) Collected:  04/06/17 1752    Specimen:  Urine from Urine, Catheter Updated:  04/07/17 0956     Urine Culture >100,000 CFU/mL Gram Negative Bacilli (A)        Imaging Results (last 24 hours)     Procedure Component Value Units Date/Time    CT Head Without Contrast [[id number]] Collected:  04/06/17 1929     Updated:  04/06/17 1935    Narrative:       CT SCAN OF THE HEAD WITHOUT CONTRAST     CLINICAL HISTORY: Patient fell. Confusion.     TECHNIQUE: Transverse 3 mm thick images were acquired from the base of  the skull to the vertex without IV contrast.     COMPARISON: CT scan of the head dated 06/17/2011.     FINDINGS: The brain and ventricular system appears structurally normal.  There is no evidence of recent or old intracranial hemorrhage or  infarction. There are no masses. Bone window images demonstrate no  evidence of skull fracture.       Impression:       Unremarkable CT scan of the head without contrast.     This report was finalized on 4/6/2017 7:32 PM by Dr. Kalpesh Pillai MD.       CT Cervical Spine Without Contrast [[id number]] Collected:  04/06/17 1940     Updated:  04/06/17 1948    Narrative:       CT SCAN OF THE CERVICAL SPINE WITHOUT CONTRAST     CLINICAL HISTORY: Fell. Neck pain.     TECHNIQUE: Multiple axial 2 mm thick images were  acquired through the  cervical spine. Coronal and sagittal reconstructions were produced.     COMPARISON: None     FINDINGS: All of the vertebral bodies are normal in height. The facet  joints appear intact. There is normal alignment. There is no evidence of  fracture or subluxation. Mild degenerative disc changes are noted at  several levels with anterior bony spurring. There are no obvious disc  herniations.       Impression:       No evidence of cervical spine fracture.     This report was finalized on 4/6/2017 7:45 PM by Dr. Kalpesh Pillai MD.       XR Chest 1 View [[id number]] Collected:  04/06/17 1949     Updated:  04/06/17 1954    Narrative:       PORTABLE CHEST SINGLE VIEW 19:07     HISTORY: 61-year-old obese female with COPD breast cancer diabetes  tobacco abuse     COMPARISON: 01/25/2016     FINDINGS:  1. Morbid obesity and AP supine portable technique limit evaluation.  2. Interstitial and vascular prominence probably chronic.  3. Right axillary dissection.  4. No focal airspace process, cannot rule out mild hydrostatic edema.     This report was finalized on 4/6/2017 7:51 PM by Dr. Marin Chery MD.             ECG/EMG Results (last 24 hours)     Procedure Component Value Units Date/Time    ECG 12 Lead [[id number]] Collected:  04/06/17 1757     Updated:  04/06/17 1901    Narrative:       RR Interval= 513 ms  WA Interval= 112 ms  QRSD Interval= 76 ms  QT Interval= 312 ms  QTc Interval= 436 ms  Heart Rate= 117 ms  P Axis= 50 deg  QRS Axis= 60 deg  T Wave Axis= 259 deg  I: 40 Axis= 36 deg  T: 40 Axis= 71 deg  ST Axis= 251 deg  SINUS TACHYCARDIA  REPOL ABNRM SUGGESTS ISCHEMIA, DIFFUSE LEADS  SINCE PREVIOUS TRACING, ST-T WAVE ABNORMAILITIES ARE MORE PROUNOUNCED  Electronically Signed by:  Arturo Kang ([facility]) 06-Apr-2017 18:56:53  Date and Time of Study: 2017-04-06 17:57:08             Physician Progress Notes (last 24 hours) (Notes from 4/6/2017  2:38 PM through 4/7/2017  2:38 PM)      Adelina Andres MD at  4/7/2017  8:18 AM  Version 1 of 1           PROGRESS NOTE   LOS: 1 day   Patient Care Team:  James Epley, APRN as PCP - General  James Epley, APRN as PCP - Family Medicine    Chief Complaint: altered mental status    Interval History: Patient was admitted on 4/6/17 with altered mental status, fall, decreased O2 sats.  In the ER she was found to be tachycardic with heart rate up to 129 and febrile with a temp of 100.7.  She was also found to be hypoxic with a normal pH and PO2 of 47 and has a history of mixed obstructive and short of lung disease.  She has a history of aspiration and is on a dysphagia diet and has previously refused PEG tube placement.  She was found to have a urinary tract infection and was treated with Levaquin to cover UTI and aspiration.  Chest x-ray showed interstitial changes, but no obvious sign of pneumonia.  She did test positive for influenza B and was treated with Tamiflu.    She did sustain a fall prior to admission and reports that she hit her head, but did not lose consciousness.  CT of head and cervical spine were unremarkable.    REVIEW OF SYSTEMS:   CARDIOVASCULAR: No chest pain, chest pressure or chest discomfort. No palpitations or edema.   RESPIRATORY: Shortness of breath.  Cough with no sputum production.  GASTROINTESTINAL: No anorexia, nausea, vomiting or diarrhea. No abdominal pain or blood.   HEMATOLOGIC: No bleeding or bruising.   When I asked the patient about why she was in the hospital, she reported that she had fallen.  She reported that she had felt dizzy and nauseated prior to falling.  She denies losing any consciousness, but reports that she did hit her head.    Ventilator/Non-Invasive Ventilation Settings     None        Vital Signs  Temp:  [98.2 °F (36.8 °C)-100.7 °F (38.2 °C)] 98.2 °F (36.8 °C)  Heart Rate:  [] 75  Resp:  [18-25] 18  BP: ()/() 124/77  SpO2:  [84 %-94 %] 93 %  on  Flow (L/min):  [4-9] 9 O2 Device: high-flow nasal  "cannula    Intake/Output Summary (Last 24 hours) at 04/07/17 1343  Last data filed at 04/07/17 0117   Gross per 24 hour   Intake             1000 ml   Output              200 ml   Net              800 ml     Flowsheet Rows         First Filed Value    Admission Height  68\" (172.7 cm) Documented at 04/06/2017 1718    Admission Weight  200 lb (90.7 kg) Documented at 04/06/2017 1718        Last 3 weights    04/06/17  1718 04/06/17  2336   Weight: 200 lb (90.7 kg) 214 lb 14.4 oz (97.5 kg)       Physical Exam:  GEN:  No acute distress, alert and oriented ×3 , cooperative, well developed on oxygen per nasal cannula  EYES:   Sclera clear. No icterus. PERRL. Normal EOM, dried blood on upper lip  ENT:   External ears/nose normal, no oral lesions, no thrush, mucous membranes moist  NECK:  Supple, midline trachea, no JVD  LUNGS: Normal chest on inspection,  no wheezes.  Diminished breath sounds with bilateral rhonchi. No crackles. Respirations regular, even and unlabored.   CV:  Regular rhythm and rate. Normal S1/S2. No murmurs, gallops, or rubs noted.  ABD:  Soft, non-tender and non-distended. Normal bowel sounds. No guarding  EXT:  Moves all extremities well. No cyanosis. No redness. No edema.   Skin: dry, intact, no bleeding    Results Review:        Results from last 7 days  Lab Units 04/07/17  0639 04/06/17  1749   SODIUM mmol/L 146* 142   POTASSIUM mmol/L 4.1 3.3*   CHLORIDE mmol/L 109* 100   TOTAL CO2 mmol/L 25.5 26.9   BUN mg/dL 9 10   CREATININE mg/dL 0.50* 0.77   CALCIUM mg/dL 7.8* 9.0   AST (SGOT) U/L 27 45*   ALT (SGPT) U/L 17 25       Results from last 7 days  Lab Units 04/06/17  1749   TROPONIN T ng/mL <0.010       Results from last 7 days  Lab Units 04/07/17  0639 04/06/17  1749   WBC 10*3/mm3 8.85 6.67   HEMOGLOBIN g/dL 10.3* 11.5*   HEMATOCRIT % 33.5* 36.6   PLATELETS 10*3/mm3 111* 124*   NEUTROPHIL % % 83.1* 76.8*   LYMPHOCYTE % % 14.0* 17.8*   MONOCYTES % % 2.6* 5.2   EOSINOPHIL % % 0.0* 0.1*   BASOPHIL % % " 0.0 0.1   IMM GRAN % % 0.3 0.0       Results from last 7 days  Lab Units 04/06/17  1749   INR  1.10               Results from last 7 days  Lab Units 04/06/17  1932   PH, ARTERIAL pH units 7.411   PO2 ART mm Hg 47.0*   PCO2, ARTERIAL mm Hg 43.0   HCO3 ART mmol/L 27.4         No results found for: POCGLU    Results from last 7 days  Lab Units 04/06/17  1749   LACTATE mmol/L 1.6       Results from last 7 days  Lab Units 04/06/17  1749   PROBNP pg/mL 157.5       Results from last 7 days  Lab Units 04/06/17  1759 04/06/17  1752   BLOODCX  No growth at less than 24 hours  --    URINECX   --  >100,000 CFU/mL Gram Negative Bacilli*       Results from last 7 days  Lab Units 04/06/17  1752   NITRITE UA  Positive*   WBC UA /HPF 6-12*   BACTERIA UA /HPF 1+*   SQUAM EPITHEL UA /HPF 0-2   URINECX  >100,000 CFU/mL Gram Negative Bacilli*   Results for LISET GILLIAM (MRN [id number]) as of 4/7/2017 09:24   Ref. Range 8/20/2015 01:14   Strep pneumoniae Ag, URINE Unknown Negative                Imaging Results (last 24 hours)     Procedure Component Value Units Date/Time    CT Head Without Contrast [[id number]] Collected:  04/06/17 1929     Updated:  04/06/17 1935    Narrative:       CT SCAN OF THE HEAD WITHOUT CONTRAST     CLINICAL HISTORY: Patient fell. Confusion.     TECHNIQUE: Transverse 3 mm thick images were acquired from the base of  the skull to the vertex without IV contrast.     COMPARISON: CT scan of the head dated 06/17/2011.     FINDINGS: The brain and ventricular system appears structurally normal.  There is no evidence of recent or old intracranial hemorrhage or  infarction. There are no masses. Bone window images demonstrate no  evidence of skull fracture.       Impression:       Unremarkable CT scan of the head without contrast.     This report was finalized on 4/6/2017 7:32 PM by Dr. Kalpesh Pillai MD.       CT Cervical Spine Without Contrast [[id number]] Collected:  04/06/17 1940     Updated:  04/06/17 1948     Narrative:       CT SCAN OF THE CERVICAL SPINE WITHOUT CONTRAST     CLINICAL HISTORY: Fell. Neck pain.     TECHNIQUE: Multiple axial 2 mm thick images were acquired through the  cervical spine. Coronal and sagittal reconstructions were produced.     COMPARISON: None     FINDINGS: All of the vertebral bodies are normal in height. The facet  joints appear intact. There is normal alignment. There is no evidence of  fracture or subluxation. Mild degenerative disc changes are noted at  several levels with anterior bony spurring. There are no obvious disc  herniations.       Impression:       No evidence of cervical spine fracture.     This report was finalized on 4/6/2017 7:45 PM by Dr. Kalpesh Pillai MD.       XR Chest 1 View [[id number]] Collected:  04/06/17 1949     Updated:  04/06/17 1954    Narrative:       PORTABLE CHEST SINGLE VIEW 19:07     HISTORY: 61-year-old obese female with COPD breast cancer diabetes  tobacco abuse     COMPARISON: 01/25/2016     FINDINGS:  1. Morbid obesity and AP supine portable technique limit evaluation.  2. Interstitial and vascular prominence probably chronic.  3. Right axillary dissection.  4. No focal airspace process, cannot rule out mild hydrostatic edema.     This report was finalized on 4/6/2017 7:51 PM by Dr. Marin Chery MD.             I reviewed the patient's new clinical results.  I personally viewed and interpreted the patient's imaging results:  CXR 4/6/17 compared to 8/19/15 shows increased interstitial markings.  No obvious sign of pneumonia, no cardiomegaly, effusion or pneumothorax.    Medication Review:     enoxaparin 40 mg Subcutaneous Daily   ipratropium-albuterol 3 mL Nebulization Q4H - RT   levoFLOXacin 500 mg Intravenous Q24H   oseltamivir 75 mg Oral Q12H         sodium chloride 0.9 % with KCl 40 mEq/L 100 mL/hr Last Rate: 100 mL/hr (04/07/17 0117)       ASSESSMENT:   1. Acute hypoxic respiratory failure  2. Acute urinary tract infection  3. Influenza B  positive  4. Toxic metabolic encephalopathy  5. History of aspiration  6. Mixed obstructive and restrictive lung disease  7. Dysphagia- was on a dysphagia diet and had previously refused PEG tube placement  8. Schizophrenia  9. Suspected obstructive sleep apnea  10. Hypokalemia-improved  11. Hypernatremia  12. Anemia    PLAN:  Continue IV Levaquin   monitor urine and blood cultures.  No sign of sepsis at this time  Continue bronchodilators  Encourage pulmonary toileting with TCDB  Lovenox for DVT prophylaxis  Await swallow evaluation by speech therapy- if patient fails bedside evaluation will place score track to administer medications.  Patient's mentation is greatly improved today and she may be a little too swallow better than her bedside evaluation done previously.  If she is able to take oral medications will switch Tamiflu to capsule.  Continue IV hydration until able to pass a swallow evaluation, but switch fluids to D5W with 0.45% saline   DNR/DNI  Allow ice chips otherwise NPO    Discussed with patient and staff    Disposition: Back to nursing home.  Will need to be set up a follow-up with pulmonary.    Adelina Andres MD  04/07/17  1:43 PM    EMR Dragon/Transcription disclaimer:   Much of this encounter note is an electronic transcription/translation of spoken language to printed text. The electronic translation of spoken language may permit erroneous, or at times, nonsensical words or phrases to be inadvertently transcribed; Although I have reviewed the note for such errors, some may still exist.        Electronically signed by Adelina Andres MD at 4/7/2017  1:43 PM

## 2017-04-07 NOTE — H&P
Patient Care Team:  James Epley, APRN as PCP - General  James Epley, APRN as PCP - Family Medicine    Chief complaint:  Altered mental status    History of present illness:  This is a 61-year-old female patient, smoker, known to have severe schizophrenia, who lives in a group home. She was found poorly responsive today by a visiting nurse.   There is no report of overdose.  She had no prior complaints preceeding this event. She's currently;y somnolent and could not provide with history.  Her sister is at bedside.  She does not use Oxygen at home.   She was last hospitalized in January 2016 for viral pneumonia when she was found to have dysphagia and was noted to be noncompliant with her dysphagia diet.    Upon arrival to the ED, she was noted to be tachycardic with HR up to 129 and febrile at 100.7.     Labs:  K=3.3; Bicar=27; PZC=374; ABG=7.41/43/47; WBC=6.6; Hb=11.5; Venus=76    Review of Systems:  Cannot obtain due to altered mental status    History  Past Medical History:   Diagnosis Date   • Abdominal pain    • Abscess of chest wall    • Acute bronchitis with bronchospasm    • Asthma    • Breast cancer    • Chronic bronchitis    • Cryptogenic organizing pneumonia    • Diabetes mellitus    • Esophageal reflux    • High blood cholesterol level     reported cholesterol level was high   • Influenza    • Pneumonia      Past Surgical History:   Procedure Laterality Date   • MASTECTOMY RADICAL Bilateral     Breast cancer   • OTHER SURGICAL HISTORY      breats surgery mastectomy     Family History   Problem Relation Age of Onset   • Other Mother      malignant neoplasm   • Other Father      malignant neoplasm of prostate     Social History   Substance Use Topics   • Smoking status: Current Every Day Smoker     Packs/day: 2.00     Types: Cigarettes   • Smokeless tobacco: None      Comment: starting smoking again   • Alcohol use No       (Not in a hospital admission)  Allergies:  Other    Objective     Vital  Signs  Temp:  [100.7 °F (38.2 °C)] 100.7 °F (38.2 °C)  Heart Rate:  [104-129] 107  Resp:  [19-25] 19  BP: (105-137)/() 105/73    Physical Exam:  Constitutional: Not in acute distress.  Eyes: Injected conjunctiva, EOMI.  ENMT: Caicedo 3.  Large tongue  Heart: RRR, no murmur  Lungs: Bilateral rhonchi.  No wheezing or crackles       Abdomen: Obese. Soft. No tenderness or dullness.  Extremities: No cyanosis, clubbing or pitting edema. Moves all extremities.  Neuro: Somnolent, oriented to place but not to time   Integumentary: No rash  Lymphatic: No palpable cervical or supraclavicular lymph nodes.      Diagnostic imaging:  I personally and independently reviewed the following images:   CXR 4/6/17: Increased interstitial markings.       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     Malini Thornton MD  04/06/17  8:09 PM        EMR Dragon/Transcription disclaimer:   Much of this encounter note is an electronic transcription/translation of spoken language to printed text. The electronic translation of spoken language may permit erroneous, or at times, nonsensical words or phrases to be inadvertently transcribed; Although I have reviewed the note for such errors, some may still exist.

## 2017-04-07 NOTE — PLAN OF CARE
Problem: Patient Care Overview (Adult)  Goal: Plan of Care Review  Outcome: Ongoing (interventions implemented as appropriate)    04/07/17 4255   Coping/Psychosocial Response Interventions   Plan Of Care Reviewed With patient   Patient Care Overview   Progress no change   Outcome Evaluation   Outcome Summary/Follow up Plan New admit, nutrition and speech to see patient, pt failed bedside swallow study, NPO, IV fluids, pt having a hard time maintaining O2 sats on high flow oxygen, vitals stable, will continue to monitor       Goal: Adult Individualization and Mutuality  Outcome: Ongoing (interventions implemented as appropriate)    Problem: Fall Risk (Adult)  Goal: Identify Related Risk Factors and Signs and Symptoms  Outcome: Ongoing (interventions implemented as appropriate)  Goal: Absence of Falls  Outcome: Ongoing (interventions implemented as appropriate)    Problem: Respiratory Insufficiency (Adult)  Goal: Identify Related Risk Factors and Signs and Symptoms  Outcome: Ongoing (interventions implemented as appropriate)  Goal: Acid/Base Balance  Outcome: Ongoing (interventions implemented as appropriate)  Goal: Effective Ventilation  Outcome: Ongoing (interventions implemented as appropriate)

## 2017-04-08 NOTE — PROGRESS NOTES
Daily Progress Note.     Bharti Roche  61 y.o.  female  4/8/2017    Brief problem (summary)  Influenza B  Hypoxic respiratory failure, acute  COPD  CHF  Schizophrenia      Today, this morning patient respiratory status was deteriorated and requiring oxygen on a nonrebreather.  She had a chest x-ray which showed pulmonary edema and stated IV fluids has been stopped and she got another dose of extra Lasix and now she looks lot better she is back to 8 L oxygen on Oxymizer.    PMS/FH/SH: reviewed and unchanged.  Medications:     enoxaparin 40 mg Subcutaneous Daily   ipratropium-albuterol 3 mL Nebulization Q4H - RT   levoFLOXacin 500 mg Intravenous Q24H   oseltamivir 75 mg Oral Q12H       dextrose 5 % and sodium chloride 0.45 % 75 mL/hr Last Rate: 75 mL/hr (04/08/17 0620)       ROS:  Respiratory ROS: no cough, shortness of breath, or wheezing    Objective:  Temp:  [97.3 °F (36.3 °C)-98.2 °F (36.8 °C)] 97.3 °F (36.3 °C)  I/O last 3 completed shifts:  In: 3300 [P.O.:400; I.V.:2900]  Out: 900 [Urine:900]  I/O this shift:  In: 456 [P.O.:456]  Out: 300 [Urine:300]    Physical Exam   Constitutional: She is oriented to person, place, and time. She appears well-developed and well-nourished. No distress. Nasal cannula in place.   HENT:   Head: Normocephalic and atraumatic.   Eyes: Pupils are equal, round, and reactive to light. No scleral icterus.   Cardiovascular: Normal rate and regular rhythm.    Pulmonary/Chest: Effort normal. No respiratory distress. She has no decreased breath sounds. She has no wheezes.   Abdominal: Soft. Bowel sounds are normal. There is no hepatosplenomegaly.   Neurological: She is alert and oriented to person, place, and time.   Skin: No cyanosis. Nails show no clubbing.   Psychiatric: She has a normal mood and affect. Her behavior is normal.      CXR      Results from last 7 days  Lab Units 04/07/17  0639 04/06/17  1749   WBC 10*3/mm3 8.85 6.67   HEMOGLOBIN g/dL 10.3* 11.5*   HEMATOCRIT % 33.5*  36.6   PLATELETS 10*3/mm3 111* 124*       Results from last 7 days  Lab Units 04/07/17  0639 04/06/17  1749   SODIUM mmol/L 146* 142   POTASSIUM mmol/L 4.1 3.3*   CHLORIDE mmol/L 109* 100   TOTAL CO2 mmol/L 25.5 26.9   BUN mg/dL 9 10   CREATININE mg/dL 0.50* 0.77   GLUCOSE mg/dL 140* 116*   CALCIUM mg/dL 7.8* 9.0       Results from last 7 days  Lab Units 04/06/17  1749   INR  1.10       Results from last 7 days  Lab Units 04/08/17  1414   PH, ARTERIAL pH units 7.462*   PO2 ART mm Hg 54.3*   PCO2, ARTERIAL mm Hg 45.2*   HCO3 ART mmol/L 32.3*       ASSESSMENT/ PLAN:  1. Acute hypoxic respiratory failure, improved with lasix  2. Acute urinary tract infection  3. Influenza B positive  4. Toxic metabolic encephalopathy  5. History of aspiration  6. Mixed obstructive and restrictive lung disease  7. Dysphagia- was on a dysphagia diet and had previously refused PEG tube placement  8. Schizophrenia  9. Suspected obstructive sleep apnea  10. Hypokalemia-improved  11. Hypernatremia  12. Anemia    DC IV fluids and continue supportive care        Josselyn Lin MD,[facility]  Pulmonary, Critical Care and Sleep Medicine.  [facility]    4/8/2017    EMR Dragon/Transcription disclaimer:   Much of this encounter note is an electronic transcription/translation of spoken language to printed text. The electronic translation of spoken language may permit erroneous, or at times, nonsensical words or phrases to be inadvertently transcribed; Although I have reviewed the note for such errors, some may still exist.

## 2017-04-08 NOTE — PLAN OF CARE
Problem: Patient Care Overview (Adult)  Goal: Plan of Care Review    04/08/17 0431   Coping/Psychosocial Response Interventions   Plan Of Care Reviewed With patient   Patient Care Overview   Progress no change   Outcome Evaluation   Outcome Summary/Follow up Plan Titrated o2 between 8-10 to keep o2 >88%. Notified dr mckeon re: low sats. CXR oredered. Minimal c/o of right knee pain. Tylenol given. Voiding per bedpan just about every hour. All other VSS. Will continue to monitor closely.       04/08/17 0431   Coping/Psychosocial Response Interventions   Plan Of Care Reviewed With patient   Patient Care Overview   Progress no change   Outcome Evaluation   Outcome Summary/Follow up Plan Titrated o2 between 8-10 to keep o2 >88%. Notified dr mckeon re: low sats. CXR oredered. Minimal c/o of right knee pain. Tylenol given. Voiding per bedpan approx every hour           Goal: Adult Individualization and Mutuality  Outcome: Ongoing (interventions implemented as appropriate)  Goal: Discharge Needs Assessment  Outcome: Ongoing (interventions implemented as appropriate)    Problem: Fall Risk (Adult)  Goal: Absence of Falls  Outcome: Ongoing (interventions implemented as appropriate)    Problem: Respiratory Insufficiency (Adult)  Goal: Identify Related Risk Factors and Signs and Symptoms  Outcome: Ongoing (interventions implemented as appropriate)  Goal: Acid/Base Balance  Outcome: Ongoing (interventions implemented as appropriate)  Goal: Effective Ventilation  Outcome: Ongoing (interventions implemented as appropriate)

## 2017-04-08 NOTE — PLAN OF CARE
Problem: Patient Care Overview (Adult)  Goal: Plan of Care Review  Outcome: Ongoing (interventions implemented as appropriate)    04/08/17 5533   Coping/Psychosocial Response Interventions   Plan Of Care Reviewed With patient   Patient Care Overview   Progress declining   Outcome Evaluation   Outcome Summary/Follow up Plan pt oxygen dropped to 79-80 increased to 15 L high flow no change, placed nonrebreather for 3 hours gave pt lasix chest x-ray from this morning showed extensive rocio pna right greater than left, ,heart enlarged can't exclude a component of CHF. D/C IVF gave 40 iv lasix. pt is now back down to 8L high flow resting comfortable.       Goal: Adult Individualization and Mutuality  Outcome: Ongoing (interventions implemented as appropriate)  Goal: Discharge Needs Assessment  Outcome: Ongoing (interventions implemented as appropriate)    Problem: Fall Risk (Adult)  Goal: Absence of Falls  Outcome: Ongoing (interventions implemented as appropriate)    Problem: Respiratory Insufficiency (Adult)  Goal: Identify Related Risk Factors and Signs and Symptoms  Outcome: Ongoing (interventions implemented as appropriate)  Goal: Acid/Base Balance  Outcome: Ongoing (interventions implemented as appropriate)  Goal: Effective Ventilation  Outcome: Ongoing (interventions implemented as appropriate)

## 2017-04-09 NOTE — PLAN OF CARE
Problem: Patient Care Overview (Adult)  Goal: Plan of Care Review  Outcome: Ongoing (interventions implemented as appropriate)    04/09/17 0484   Coping/Psychosocial Response Interventions   Plan Of Care Reviewed With patient   Patient Care Overview   Progress no change   Outcome Evaluation   Outcome Summary/Follow up Plan pt slept most of the day , set up in chair with meals walked to restroom. wean O2 down to 6 L NC        Goal: Adult Individualization and Mutuality  Outcome: Ongoing (interventions implemented as appropriate)  Goal: Discharge Needs Assessment  Outcome: Ongoing (interventions implemented as appropriate)    Problem: Fall Risk (Adult)  Goal: Absence of Falls  Outcome: Ongoing (interventions implemented as appropriate)    Problem: Respiratory Insufficiency (Adult)  Goal: Acid/Base Balance  Outcome: Ongoing (interventions implemented as appropriate)  Goal: Effective Ventilation  Outcome: Ongoing (interventions implemented as appropriate)

## 2017-04-09 NOTE — PROGRESS NOTES
Daily Progress Note.     Bharti Roche  61 y.o.  female  4/9/2017    Brief problem (summary)  Influenza B  Hypoxic respiratory failure, acute  COPD  CHF  Schizophrenia      Today, this morning patient respiratory status was deteriorated and requiring oxygen on a nonrebreather.  She had a chest x-ray which showed pulmonary edema and stated IV fluids has been stopped and she got another dose of extra Lasix and now she looks lot better she is back to 6 L oxygen on Oxymizer.    PMS/FH/SH: reviewed and unchanged.  Medications:     enoxaparin 40 mg Subcutaneous Daily   ipratropium-albuterol 3 mL Nebulization Q4H - RT   levoFLOXacin 500 mg Intravenous Q24H   oseltamivir 75 mg Oral Q12H       dextrose 5 % and sodium chloride 0.45 % 75 mL/hr Last Rate: 75 mL/hr (04/08/17 0620)       ROS:  Respiratory ROS: no cough, shortness of breath, or wheezing    Objective:  Temp:  [98.9 °F (37.2 °C)-101.6 °F (38.7 °C)] 98.9 °F (37.2 °C)  I/O last 3 completed shifts:  In: 2356 [P.O.:456; I.V.:1900]  Out: 1550 [Urine:1550]  I/O this shift:  In: 240 [P.O.:240]  Out: -     Physical Exam   Constitutional: She is oriented to person, place, and time. She appears well-developed and well-nourished. No distress. Nasal cannula in place.   HENT:   Head: Normocephalic and atraumatic.   Eyes: Pupils are equal, round, and reactive to light. No scleral icterus.   Cardiovascular: Normal rate and regular rhythm.    Pulmonary/Chest: Effort normal. No respiratory distress. She has no decreased breath sounds. She has no wheezes.   Abdominal: Soft. Bowel sounds are normal. There is no hepatosplenomegaly.   Neurological: She is alert and oriented to person, place, and time.   Skin: No cyanosis. Nails show no clubbing.   Psychiatric: She has a normal mood and affect. Her behavior is normal.          Results from last 7 days  Lab Units 04/07/17  0639 04/06/17  1749   WBC 10*3/mm3 8.85 6.67   HEMOGLOBIN g/dL 10.3* 11.5*   HEMATOCRIT % 33.5* 36.6   PLATELETS  10*3/mm3 111* 124*       Results from last 7 days  Lab Units 04/07/17  0639 04/06/17  1749   SODIUM mmol/L 146* 142   POTASSIUM mmol/L 4.1 3.3*   CHLORIDE mmol/L 109* 100   TOTAL CO2 mmol/L 25.5 26.9   BUN mg/dL 9 10   CREATININE mg/dL 0.50* 0.77   GLUCOSE mg/dL 140* 116*   CALCIUM mg/dL 7.8* 9.0       Results from last 7 days  Lab Units 04/06/17  1749   INR  1.10       Results from last 7 days  Lab Units 04/08/17  1414   PH, ARTERIAL pH units 7.462*   PO2 ART mm Hg 54.3*   PCO2, ARTERIAL mm Hg 45.2*   HCO3 ART mmol/L 32.3*       ASSESSMENT/ PLAN:  1. Acute hypoxic respiratory failure, improved with lasix  2. Acute urinary tract infection, on levaquin  3. Influenza B positive, on tamiflu  4. Toxic metabolic encephalopathy  5. History of aspiration  6. Mixed obstructive and restrictive lung disease  7. Dysphagia- was on a dysphagia diet and had previously refused PEG tube placement  8. Schizophrenia  9. Suspected obstructive sleep apnea  10. Hypokalemia-improved  11. Hypernatremia  12. Anemia    Check labs      Josselyn Lin MD,[facility]  Pulmonary, Critical Care and Sleep Medicine.  [facility]    4/9/2017    EMR Dragon/Transcription disclaimer:   Much of this encounter note is an electronic transcription/translation of spoken language to printed text. The electronic translation of spoken language may permit erroneous, or at times, nonsensical words or phrases to be inadvertently transcribed; Although I have reviewed the note for such errors, some may still exist.

## 2017-04-09 NOTE — PLAN OF CARE
Problem: Patient Care Overview (Adult)  Goal: Plan of Care Review  Outcome: Ongoing (interventions implemented as appropriate)    04/09/17 0549   Coping/Psychosocial Response Interventions   Plan Of Care Reviewed With patient   Patient Care Overview   Progress improving   Outcome Evaluation   Outcome Summary/Follow up Plan Pt rested well. No c/o pain or N/V. Temp spiked to 101.6, Tylenol given with good relief. Still on 8L O2, sats drop when NC is off. VSS, no s/s acute distress noted       Goal: Adult Individualization and Mutuality  Outcome: Ongoing (interventions implemented as appropriate)    Problem: Fall Risk (Adult)  Goal: Absence of Falls  Outcome: Ongoing (interventions implemented as appropriate)    Problem: Respiratory Insufficiency (Adult)  Goal: Identify Related Risk Factors and Signs and Symptoms  Outcome: Outcome(s) achieved Date Met:  04/09/17  Goal: Acid/Base Balance  Outcome: Ongoing (interventions implemented as appropriate)  Goal: Effective Ventilation  Outcome: Ongoing (interventions implemented as appropriate)

## 2017-04-10 NOTE — PROGRESS NOTES
PROGRESS NOTE   LOS: 4 days   Patient Care Team:  James Epley, APRN as PCP - General  James Epley, APRN as PCP - Family Medicine    Chief Complaint: altered mental status    Interval History: Patient was admitted on 4/6/17 with altered mental status, fall, decreased O2 sats.  In the ER she was found to be tachycardic with heart rate up to 129 and febrile with a temp of 100.7.  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  She has a history of aspiration and is on a dysphagia diet and has previously refused PEG tube placement.  She was found to have a urinary tract infection and was treated with Levaquin to cover UTI and aspiration.  Chest x-ray showed interstitial changes, but no obvious sign of pneumonia.  She did test positive for influenza B and was treated with Tamiflu.    On 4/7/17 and she was evaluated by speech therapy and found to have moderate oropharyngeal dysphagia without esophageal component was started on nectar thick liquid and puréed diet with medications crushed with puréed.  On 4/8/17 and her respiratory status had deteriorated and she was requiring high levels of oxygen.  She had a chest x-ray that showed extensive pulmonary edema and her IV fluids were discontinued and she was given an extra dose of Lasix.      She did sustain a fall prior to admission and reports that she hit her head, but did not lose consciousness.  CT of head and cervical spine were unremarkable.    REVIEW OF SYSTEMS:   CARDIOVASCULAR: No chest pain, chest pressure or chest discomfort. No palpitations or edema.   RESPIRATORY: Shortness of breath.  Cough with no sputum production.  GASTROINTESTINAL: No anorexia, nausea, vomiting or diarrhea. No abdominal pain or blood.   HEMATOLOGIC: No bleeding or bruising.   When I asked the patient about why she was in the hospital, she reported that she had fallen.  She reported that she had felt  "dizzy and nauseated prior to falling.  She denies losing any consciousness, but reports that she did hit her head.    Ventilator/Non-Invasive Ventilation Settings     None        Vital Signs  Temp:  [98.4 °F (36.9 °C)-102.3 °F (39.1 °C)] 98.5 °F (36.9 °C)  Heart Rate:  [73-97] 87  Resp:  [16-24] 18  BP: (121-147)/(62-86) 121/80  SpO2:  [91 %-95 %] 93 %  on  Flow (L/min):  [6-8] 8 O2 Device: high-flow nasal cannula    Intake/Output Summary (Last 24 hours) at 04/10/17 1639  Last data filed at 04/10/17 1420   Gross per 24 hour   Intake               60 ml   Output              710 ml   Net             -650 ml     Flowsheet Rows         First Filed Value    Admission Height  68\" (172.7 cm) Documented at 04/06/2017 1718    Admission Weight  200 lb (90.7 kg) Documented at 04/06/2017 1718        Last 3 weights    04/06/17  1718 04/06/17  2336 04/09/17  0600   Weight: 200 lb (90.7 kg) 214 lb 14.4 oz (97.5 kg) 205 lb 8 oz (93.2 kg)       Physical Exam:  GEN:  No acute distress, alert and oriented ×3, cooperative, well developed on oxygen per high flow nasal cannula, lethargic  EYES:   Sclera clear. No icterus. PERRL. Normal EOM  ENT:   External ears/nose normal, no oral lesions, thrush, mucous membranes moist  NECK:  Supple, midline trachea, no JVD  LUNGS: Normal chest on inspection,  Mild expiratory wheezes.  Diminished breath sounds with bilateral rhonchi throughout and minimal faintcrackles bilateral lower lobe. Respirations regular, even and unlabored.   CV:  Regular rhythm and rate. Normal S1/S2. No murmurs, gallops, or rubs noted.  ABD:  Soft, non-tender and non-distended. Normal bowel sounds. No guarding  EXT:  Moves all extremities well. No cyanosis. No redness. No edema.   Skin: dry, intact, no bleeding    Results Review:        Results from last 7 days  Lab Units 04/10/17  0651 04/07/17  0639 04/06/17  1749   SODIUM mmol/L 142 146* 142   POTASSIUM mmol/L 3.2* 4.1 3.3*   CHLORIDE mmol/L 100 109* 100   TOTAL CO2 " mmol/L 30.4* 25.5 26.9   BUN mg/dL 12 9 10   CREATININE mg/dL 0.50* 0.50* 0.77   CALCIUM mg/dL 8.8 7.8* 9.0   AST (SGOT) U/L  --  27 45*   ALT (SGPT) U/L  --  17 25       Results from last 7 days  Lab Units 04/06/17  1749   TROPONIN T ng/mL <0.010       Results from last 7 days  Lab Units 04/10/17  0651 04/07/17  0639 04/06/17  1749   WBC 10*3/mm3 5.36 8.85 6.67   HEMOGLOBIN g/dL 10.4* 10.3* 11.5*   HEMATOCRIT % 33.3* 33.5* 36.6   PLATELETS 10*3/mm3 113* 111* 124*   NEUTROPHIL % %  --  83.1* 76.8*   LYMPHOCYTE % %  --  14.0* 17.8*   MONOCYTES % %  --  2.6* 5.2   EOSINOPHIL % %  --  0.0* 0.1*   BASOPHIL % %  --  0.0 0.1   IMM GRAN % %  --  0.3 0.0       Results from last 7 days  Lab Units 04/06/17  1749   INR  1.10               Results from last 7 days  Lab Units 04/10/17  1247 04/08/17  1414 04/06/17  1932   PH, ARTERIAL pH units 7.433 7.462* 7.411   PO2 ART mm Hg 60.8* 54.3* 47.0*   PCO2, ARTERIAL mm Hg 49.3* 45.2* 43.0   HCO3 ART mmol/L 32.9* 32.3* 27.4         Glucose   Date/Time Value Ref Range Status   04/08/2017 1406 84 70 - 130 mg/dL Final       Results from last 7 days  Lab Units 04/10/17  1202 04/06/17  1749   LACTATE mmol/L 0.7 1.6       Results from last 7 days  Lab Units 04/06/17  1749   PROBNP pg/mL 157.5       Results from last 7 days  Lab Units 04/06/17  1759 04/06/17  1752   BLOODCX  No growth at 3 days  --    URINECX   --  >100,000 CFU/mL Escherichia coli*       Results from last 7 days  Lab Units 04/06/17  1752   NITRITE UA  Positive*   WBC UA /HPF 6-12*   BACTERIA UA /HPF 1+*   SQUAM EPITHEL UA /HPF 0-2   URINECX  >100,000 CFU/mL Escherichia coli*   Results for LISET GILLIAM (MRN [id number]) as of 4/7/2017 09:24   Ref. Range 8/20/2015 01:14   Strep pneumoniae Ag, URINE Unknown Negative                         I reviewed the patient's new clinical results.  I personally viewed and interpreted the patient's imaging results:    CXR 4/10/17 compared to 4/8/17 shows significant improvement of  aeration with residual atelectasis vs infiltrate BLL .     Medication Review:     enoxaparin 40 mg Subcutaneous Daily   ipratropium-albuterol 3 mL Nebulization Q4H - RT   nystatin 5 mL Oral 4x Daily   oseltamivir 75 mg Oral Q12H            ASSESSMENT:   1. Acute hypoxic respiratory failure  2. Chronic hypercapnic resp. failure, compensated  3. Acute urinary tract infection  4. Influenza B positive  5. Toxic metabolic encephalopathy  6. History of aspiration  7. Mixed obstructive and restrictive lung disease  8. Dysphagia- was on a dysphagia diet and had previously refused PEG tube placement  9. Schizophrenia  10. Suspected obstructive sleep apnea  11. Hypokalemia-improved  12. Hypernatremia  13. Anemia    PLAN:  She finished 3 days of levaquin for UTI, will re check UA  She is more lethargic today still on 7 L high flow oxygen , ABG showed compensated hypercapnea and corrected hypoxemia  CXR is looking much better, may need to evaluated for PE if no better, will check D-Dimer , patient is already on 40 mg daily Lovenox  Discontinue IV fluids as she is tolerating diet at this point.  Nystatin for thrush    Encourage pulmonary toileting with TCDB, IS and bronchodilators  Lovenox for DVT prophylaxis  DNR/DNI    Discussed with patient and staff    Disposition: Back to nursing home.  Will need to be set up a follow-up with pulmonary.    Adelina Andres MD  04/10/17  4:39 PM    EMR Dragon/Transcription disclaimer:   Much of this encounter note is an electronic transcription/translation of spoken language to printed text. The electronic translation of spoken language may permit erroneous, or at times, nonsensical words or phrases to be inadvertently transcribed; Although I have reviewed the note for such errors, some may still exist.

## 2017-04-10 NOTE — PLAN OF CARE
Problem: Patient Care Overview (Adult)  Goal: Plan of Care Review    04/10/17 0746   Coping/Psychosocial Response Interventions   Plan Of Care Reviewed With patient   Patient Care Overview   Progress no change   Outcome Evaluation   Outcome Summary/Follow up Plan Pt dangled at the bedside, Up with one assist, Temp elevated at the start of shift, VS monitored. O2 at 6L/min. IV saline locked.         Problem: Fall Risk (Adult)  Goal: Absence of Falls  Outcome: Ongoing (interventions implemented as appropriate)    04/10/17 0746   Fall Risk (Adult)   Absence of Falls making progress toward outcome         Problem: Respiratory Insufficiency (Adult)  Goal: Acid/Base Balance  Outcome: Ongoing (interventions implemented as appropriate)    04/10/17 0746   Respiratory Insufficiency (Adult)   Acid/Base Balance making progress toward outcome

## 2017-04-10 NOTE — PROGRESS NOTES
Continued Stay Note  [facility]     Patient Name: Bharti Roche  MRN: [id number]  Today's Date: 4/10/2017    Admit Date: 4/6/2017          Discharge Plan     None              Discharge Codes     None            Cat Cyr RN

## 2017-04-10 NOTE — PROGRESS NOTES
Continued Stay Note  [facility]     Patient Name: Bharti Roche  MRN: [id number]  Today's Date: 4/10/2017    Admit Date: 4/6/2017          Discharge Plan       04/10/17 1420    Case Management/Social Work Plan    Plan return to group home    Patient/Family In Agreement With Plan yes    Additional Comments Spoke with patient and sister, Barrie, in room.  They confirm plan is back to group home.  Patient currently on 6 liters o2.  Note left for MD requesting PT eval.               Discharge Codes     None            Cat Cyr, RN

## 2017-04-10 NOTE — PLAN OF CARE
Problem: Patient Care Overview (Adult)  Goal: Plan of Care Review  Outcome: Ongoing (interventions implemented as appropriate)    04/10/17 0838   Coping/Psychosocial Response Interventions   Plan Of Care Reviewed With patient   Patient Care Overview   Progress no change   Outcome Evaluation   Outcome Summary/Follow up Plan Pt very drowsy this AM, but more alert this afternoon/evening. Still requiring high flow o2 at 8 liters. ABG noted. pulm NP stats CXR looks better       Goal: Adult Individualization and Mutuality  Outcome: Ongoing (interventions implemented as appropriate)    Problem: Fall Risk (Adult)  Goal: Absence of Falls  Outcome: Ongoing (interventions implemented as appropriate)    Problem: Respiratory Insufficiency (Adult)  Goal: Acid/Base Balance  Outcome: Ongoing (interventions implemented as appropriate)  Goal: Effective Ventilation  Outcome: Ongoing (interventions implemented as appropriate)

## 2017-04-11 NOTE — PROGRESS NOTES
PROGRESS NOTE   LOS: 5 days   Patient Care Team:  James Epley, APRN as PCP - General  James Epley, APRN as PCP - Family Medicine    Chief Complaint: altered mental status    Interval History: Patient was admitted on 4/6/17 with altered mental status, fall, decreased O2 sats.  In the ER she was found to be tachycardic with heart rate up to 129 and febrile with a temp of 100.7.  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  She has a history of aspiration and is on a dysphagia diet and has previously refused PEG tube placement.  She was found to have a urinary tract infection and was treated with Levaquin to cover UTI and aspiration.  Chest x-ray showed interstitial changes, but no obvious sign of pneumonia.  She did test positive for influenza B and was treated with Tamiflu.    On 4/7/17 and she was evaluated by speech therapy and found to have moderate oropharyngeal dysphagia without esophageal component was started on nectar thick liquid and puréed diet with medications crushed with puréed.  On 4/8/17 and her respiratory status had deteriorated and she was requiring high levels of oxygen.  She had a chest x-ray that showed extensive pulmonary edema and her IV fluids were discontinued and she was given an extra dose of Lasix.      She did sustain a fall prior to admission and reports that she hit her head, but did not lose consciousness.  CT of head and cervical spine were unremarkable.    Chest x-ray on April 10, 2017 showed significant improvement.  Patient was still on ventilator this morning however when I turned her down to 4 L/m she was still maintaining adequate oxygenation in the low 90s and need to be weaned more aggressively.    REVIEW OF SYSTEMS:   CARDIOVASCULAR: No chest pain, chest pressure or chest discomfort. No palpitations or edema.   RESPIRATORY: resolved Shortness of breath. No  Cough with no sputum  "production.  GASTROINTESTINAL: No anorexia, nausea, vomiting or diarrhea. No abdominal pain or blood.   HE    Ventilator/Non-Invasive Ventilation Settings     None        Vital Signs  Temp:  [97.9 °F (36.6 °C)-100 °F (37.8 °C)] 97.9 °F (36.6 °C)  Heart Rate:  [67-97] 93  Resp:  [16-22] 19  BP: (110-123)/(56-78) 122/78  SpO2:  [89 %-96 %] 96 %  on  Flow (L/min):  [4-8] 7.5 O2 Device: high-flow nasal cannula    Intake/Output Summary (Last 24 hours) at 04/11/17 1400  Last data filed at 04/11/17 0922   Gross per 24 hour   Intake              660 ml   Output             1200 ml   Net             -540 ml     Flowsheet Rows         First Filed Value    Admission Height  68\" (172.7 cm) Documented at 04/06/2017 1718    Admission Weight  200 lb (90.7 kg) Documented at 04/06/2017 1718        Last 3 weights    04/06/17  1718 04/06/17  2336 04/09/17  0600   Weight: 200 lb (90.7 kg) 214 lb 14.4 oz (97.5 kg) 205 lb 8 oz (93.2 kg)       Physical Exam:  GEN:  No acute distress, alert and oriented ×3, cooperative, well developed on oxygen per high flow nasal cannula, alert and pleasant  EYES:   Sclera clear. No icterus. PERRL. Normal EOM  ENT:   External ears/nose normal, no oral lesions, resolved thrush, mucous membranes moist  NECK:  Supple, midline trachea, no JVD  LUNGS: Normal chest on inspection,  CTA.  Diminished breath sounds with bilateral rhonchi . Respirations regular, even and unlabored.   CV:  Regular rhythm and rate. Normal S1/S2. No murmurs, gallops, or rubs noted.  ABD:  Soft, non-tender and non-distended. Normal bowel sounds. No guarding  EXT:  Moves all extremities well. No cyanosis. No redness. No edema.   Skin: dry, intact, no bleeding    Results Review:        Results from last 7 days  Lab Units 04/11/17  0147 04/10/17  0651 04/07/17  0639 04/06/17  1749   SODIUM mmol/L  --  142 146* 142   POTASSIUM mmol/L 3.8 3.2* 4.1 3.3*   CHLORIDE mmol/L  --  100 109* 100   TOTAL CO2 mmol/L  --  30.4* 25.5 26.9   BUN mg/dL  " --  12 9 10   CREATININE mg/dL  --  0.50* 0.50* 0.77   CALCIUM mg/dL  --  8.8 7.8* 9.0   AST (SGOT) U/L  --   --  27 45*   ALT (SGPT) U/L  --   --  17 25       Results from last 7 days  Lab Units 04/06/17  1749   TROPONIN T ng/mL <0.010       Results from last 7 days  Lab Units 04/10/17  0651 04/07/17  0639 04/06/17  1749   WBC 10*3/mm3 5.36 8.85 6.67   HEMOGLOBIN g/dL 10.4* 10.3* 11.5*   HEMATOCRIT % 33.3* 33.5* 36.6   PLATELETS 10*3/mm3 113* 111* 124*   NEUTROPHIL % %  --  83.1* 76.8*   LYMPHOCYTE % %  --  14.0* 17.8*   MONOCYTES % %  --  2.6* 5.2   EOSINOPHIL % %  --  0.0* 0.1*   BASOPHIL % %  --  0.0 0.1   IMM GRAN % %  --  0.3 0.0       Results from last 7 days  Lab Units 04/06/17  1749   INR  1.10               Results from last 7 days  Lab Units 04/10/17  1247 04/08/17  1414 04/06/17  1932   PH, ARTERIAL pH units 7.433 7.462* 7.411   PO2 ART mm Hg 60.8* 54.3* 47.0*   PCO2, ARTERIAL mm Hg 49.3* 45.2* 43.0   HCO3 ART mmol/L 32.9* 32.3* 27.4         Glucose   Date/Time Value Ref Range Status   04/08/2017 1406 84 70 - 130 mg/dL Final       Results from last 7 days  Lab Units 04/10/17  1202 04/06/17  1749   LACTATE mmol/L 0.7 1.6       Results from last 7 days  Lab Units 04/06/17  1749   PROBNP pg/mL 157.5   Results for LISET GILLIAM (MRN [id number]) as of 4/11/2017 14:01   Ref. Range 4/10/2017 20:39   D-dimer, Quant Latest Ref Range: 0.00 - 0.49 MCGFEU/mL 3.45 (H)       Results from last 7 days  Lab Units 04/06/17  1759 04/06/17  1752   BLOODCX  No growth at 4 days  --    URINECX   --  >100,000 CFU/mL Escherichia coli*       Results from last 7 days  Lab Units 04/10/17  2046 04/06/17  1752   NITRITE UA  Negative Positive*   WBC UA /HPF  --  6-12*   BACTERIA UA /HPF  --  1+*   SQUAM EPITHEL UA /HPF  --  0-2   URINECX   --  >100,000 CFU/mL Escherichia coli*   Results for LISET GILLIAM (MRN [id number]) as of 4/7/2017 09:24   Ref. Range 8/20/2015 01:14   Strep pneumoniae Ag, URINE Unknown Negative               Patient Information      Patient Name MRN Sex  (Age)     Bharti Roche [id number] Female 1955 (61 y.o.)       Admission Information      Admission Date/Time Discharge Date/Time Room/Bed     17  1095  613/1       Interpretation Summary      · Normal bilateral lower extremity venous duplex                 I reviewed the patient's new clinical results.  I personally viewed and interpreted the patient's imaging results:    CXR 4/10/17 compared to 17 shows significant improvement of aeration with residual atelectasis vs infiltrate BLL .     Medication Review:     enoxaparin 40 mg Subcutaneous Daily   ipratropium-albuterol 3 mL Nebulization Q4H - RT   nystatin 5 mL Oral 4x Daily   oseltamivir 75 mg Oral Q12H            ASSESSMENT:   1. Acute hypoxic respiratory failure  2. Chronic hypercapnic resp. failure, compensated  3. Acute urinary tract infection  4. Influenza B positive  5. Toxic metabolic encephalopathy  6. History of aspiration  7. Mixed obstructive and restrictive lung disease  8. Dysphagia- was on a dysphagia diet and had previously refused PEG tube placement  9. Schizophrenia  10. Suspected obstructive sleep apnea  11. Hypokalemia-improved  12. Hypernatremia  13. Anemia    PLAN:  She finished 3 days of levaquin for UTI, repeat UA is clean  CXR is looking much better, elevated D-Dimer but no DVT and improving resp. status, need to continue O2 taper and continue with the Lovenox prophylactic dose.      Encourage pulmonary toileting with TCDB, IS and bronchodilators  Lovenox for DVT prophylaxis  DNR/DNI    Discussed with patient and staff    Disposition: Back to nursing home.  Will need to be set up a follow-up with pulmonary.    Adelina Andres MD  17  2:00 PM    EMR Dragon/Transcription disclaimer:   Much of this encounter note is an electronic transcription/translation of spoken language to printed text. The electronic translation of spoken language may permit erroneous, or at times,  nonsensical words or phrases to be inadvertently transcribed; Although I have reviewed the note for such errors, some may still exist.

## 2017-04-11 NOTE — PLAN OF CARE
Problem: Patient Care Overview (Adult)  Goal: Plan of Care Review  Outcome: Ongoing (interventions implemented as appropriate)    04/10/17 1644 04/10/17 2045 04/11/17 0109   Coping/Psychosocial Response Interventions   Plan Of Care Reviewed With --  patient --    Patient Care Overview   Progress no change --  --    Outcome Evaluation   Outcome Summary/Follow up Plan --  --  correcting potassium, d dimer elevated, expect doppler, u/a sent, high flow o2, expect d/c group home       Goal: Adult Individualization and Mutuality  Outcome: Ongoing (interventions implemented as appropriate)  Goal: Discharge Needs Assessment  Outcome: Ongoing (interventions implemented as appropriate)    Problem: Fall Risk (Adult)  Goal: Absence of Falls  Outcome: Ongoing (interventions implemented as appropriate)    Problem: Respiratory Insufficiency (Adult)  Goal: Effective Ventilation  Outcome: Ongoing (interventions implemented as appropriate)

## 2017-04-11 NOTE — PLAN OF CARE
Problem: Patient Care Overview (Adult)  Goal: Plan of Care Review  Outcome: Ongoing (interventions implemented as appropriate)    04/11/17 1546   Coping/Psychosocial Response Interventions   Plan Of Care Reviewed With patient;sibling   Outcome Evaluation   Outcome Summary/Follow up Plan Pt stable with O2 therapy. No SOB noted. Doppler results neg. Afebrile. VS and labs monitored. Droplet isolation continues.       Goal: Adult Individualization and Mutuality  Outcome: Ongoing (interventions implemented as appropriate)  Goal: Discharge Needs Assessment  Outcome: Ongoing (interventions implemented as appropriate)    Problem: Fall Risk (Adult)  Goal: Absence of Falls  Outcome: Ongoing (interventions implemented as appropriate)    Problem: Respiratory Insufficiency (Adult)  Goal: Acid/Base Balance  Outcome: Ongoing (interventions implemented as appropriate)  Goal: Effective Ventilation  Outcome: Ongoing (interventions implemented as appropriate)

## 2017-04-12 NOTE — PLAN OF CARE
Problem: Patient Care Overview (Adult)  Goal: Plan of Care Review  Outcome: Ongoing (interventions implemented as appropriate)    04/12/17 1601   Coping/Psychosocial Response Interventions   Plan Of Care Reviewed With patient   Outcome Evaluation   Outcome Summary/Follow up Plan Patient encourage to use IS, TCDB, with plan to return to group home. Tamiflu complete this am.       Goal: Adult Individualization and Mutuality  Outcome: Ongoing (interventions implemented as appropriate)  Goal: Discharge Needs Assessment  Outcome: Ongoing (interventions implemented as appropriate)    Problem: Fall Risk (Adult)  Goal: Absence of Falls  Outcome: Ongoing (interventions implemented as appropriate)    Problem: Respiratory Insufficiency (Adult)  Goal: Effective Ventilation  Outcome: Ongoing (interventions implemented as appropriate)

## 2017-04-12 NOTE — PROGRESS NOTES
PROGRESS NOTE   LOS: 6 days   Patient Care Team:  James Epley, APRN as PCP - General  James Epley, APRN as PCP - Family Medicine    Chief Complaint: altered mental status, improving    Interval History: Patient was admitted on 4/6/17 with altered mental status, fall, decreased O2 sats.  In the ER she was found to be tachycardic with heart rate up to 129 and febrile with a temp of 100.7.  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  She has a history of aspiration and is on a dysphagia diet and has previously refused PEG tube placement.  She was found to have a urinary tract infection and was treated with Levaquin to cover UTI and aspiration.  Chest x-ray showed interstitial changes, but no obvious sign of pneumonia.  She did test positive for influenza B and was treated with Tamiflu.    On 4/7/17 and she was evaluated by speech therapy and found to have moderate oropharyngeal dysphagia without esophageal component was started on nectar thick liquid and puréed diet with medications crushed with puréed.  On 4/8/17 and her respiratory status had deteriorated and she was requiring high levels of oxygen.  She had a chest x-ray that showed extensive pulmonary edema and her IV fluids were discontinued and she was given an extra dose of Lasix.      She did sustain a fall prior to admission and reports that she hit her head, but did not lose consciousness.  CT of head and cervical spine were unremarkable.    Chest x-ray on April 10, 2017 showed significant improvement.  Patient was still on ventilator this morning however when I turned her down to 4 L/m she was still maintaining adequate oxygenation in the low 90s and need to be weaned more aggressively.  On April 12, 2017 patient was in the mid 90s on 4 L nasal cannula and continues to improve    REVIEW OF SYSTEMS:   CARDIOVASCULAR: No chest pain, chest pressure or chest  "discomfort. No palpitations or edema.   RESPIRATORY: resolved Shortness of breath. No  Cough with no sputum production.    GASTROINTESTINAL: No anorexia, nausea, vomiting or diarrhea. No abdominal pain or blood.  She was having some heartburn yesterday which did improve      Ventilator/Non-Invasive Ventilation Settings     None        Vital Signs  Temp:  [98.2 °F (36.8 °C)-98.7 °F (37.1 °C)] 98.7 °F (37.1 °C)  Heart Rate:  [73-92] 85  Resp:  [16-19] 18  BP: (113-138)/(69-81) 119/69  SpO2:  [90 %-97 %] 93 %  on  Flow (L/min):  [4-8] 5 O2 Device: high-flow nasal cannula  No intake or output data in the 24 hours ending 04/12/17 1402  Flowsheet Rows         First Filed Value    Admission Height  68\" (172.7 cm) Documented at 04/06/2017 1718    Admission Weight  200 lb (90.7 kg) Documented at 04/06/2017 1718        Last 3 weights    04/06/17  1718 04/06/17  2336 04/09/17  0600   Weight: 200 lb (90.7 kg) 214 lb 14.4 oz (97.5 kg) 205 lb 8 oz (93.2 kg)       Physical Exam:  GEN:  No acute distress, alert and oriented ×3, cooperative, well developed on oxygen per high flow nasal cannula, alert and pleasant    LUNGS: Normal chest on inspection,  CTA.  Diminished breath sounds with bilateral rhonchi . Respirations regular, even     Results Review:        Results from last 7 days  Lab Units 04/11/17  0147 04/10/17  0651 04/07/17  0639 04/06/17  1749   SODIUM mmol/L  --  142 146* 142   POTASSIUM mmol/L 3.8 3.2* 4.1 3.3*   CHLORIDE mmol/L  --  100 109* 100   TOTAL CO2 mmol/L  --  30.4* 25.5 26.9   BUN mg/dL  --  12 9 10   CREATININE mg/dL  --  0.50* 0.50* 0.77   CALCIUM mg/dL  --  8.8 7.8* 9.0   AST (SGOT) U/L  --   --  27 45*   ALT (SGPT) U/L  --   --  17 25       Results from last 7 days  Lab Units 04/06/17  1749   TROPONIN T ng/mL <0.010       Results from last 7 days  Lab Units 04/10/17  0651 04/07/17  0639 04/06/17  1749   WBC 10*3/mm3 5.36 8.85 6.67   HEMOGLOBIN g/dL 10.4* 10.3* 11.5*   HEMATOCRIT % 33.3* 33.5* 36.6 "   PLATELETS 10*3/mm3 113* 111* 124*   NEUTROPHIL % %  --  83.1* 76.8*   LYMPHOCYTE % %  --  14.0* 17.8*   MONOCYTES % %  --  2.6* 5.2   EOSINOPHIL % %  --  0.0* 0.1*   BASOPHIL % %  --  0.0 0.1   IMM GRAN % %  --  0.3 0.0       Results from last 7 days  Lab Units 17  1749   INR  1.10               Results from last 7 days  Lab Units 04/10/17  1247 17  1414 17  1932   PH, ARTERIAL pH units 7.433 7.462* 7.411   PO2 ART mm Hg 60.8* 54.3* 47.0*   PCO2, ARTERIAL mm Hg 49.3* 45.2* 43.0   HCO3 ART mmol/L 32.9* 32.3* 27.4         Glucose   Date/Time Value Ref Range Status   2017 2048 100 70 - 130 mg/dL Final       Results from last 7 days  Lab Units 04/10/17  1202 17  1749   LACTATE mmol/L 0.7 1.6       Results from last 7 days  Lab Units 17  1749   PROBNP pg/mL 157.5   Results for LISET GILLIAM (MRN [id number]) as of 2017 14:01   Ref. Range 4/10/2017 20:39   D-dimer, Quant Latest Ref Range: 0.00 - 0.49 MCGFEU/mL 3.45 (H)       Results from last 7 days  Lab Units 17  1759 17  1752   BLOODCX  No growth at 5 days  --    URINECX   --  >100,000 CFU/mL Escherichia coli*       Results from last 7 days  Lab Units 04/10/17  2046 17  1752   NITRITE UA  Negative Positive*   WBC UA /HPF  --  6-12*   BACTERIA UA /HPF  --  1+*   SQUAM EPITHEL UA /HPF  --  0-2   URINECX   --  >100,000 CFU/mL Escherichia coli*   Results for LISET GILLIAM (MRN [id number]) as of 2017 09:24   Ref. Range 2015 01:14   Strep pneumoniae Ag, URINE Unknown Negative              Patient Information      Patient Name MRN Sex  (Age)     Liset Gilliam [id number] Female 1955 (61 y.o.)       Admission Information      Admission Date/Time Discharge Date/Time Room/Bed     17  5480 344/4       Interpretation Summary      · Normal bilateral lower extremity venous duplex                 I reviewed the patient's new clinical results.  I personally viewed and interpreted the patient's  imaging results:    CXR 4/10/17 compared to 4/8/17 shows significant improvement of aeration with residual atelectasis vs infiltrate BLL .     Medication Review:     enoxaparin 40 mg Subcutaneous Daily   ipratropium-albuterol 3 mL Nebulization Q4H - RT   nystatin 5 mL Oral 4x Daily            ASSESSMENT:   1. Acute hypoxic respiratory failure  2. Chronic hypercapnic resp. failure, compensated  3. Acute urinary tract infection  4. Influenza B positive  5. Toxic metabolic encephalopathy  6. History of aspiration  7. Mixed obstructive and restrictive lung disease  8. Dysphagia- was on a dysphagia diet and had previously refused PEG tube placement  9. Schizophrenia  10. Suspected obstructive sleep apnea  11. Hypokalemia-improved  12. Hypernatremia  13. Anemia    PLAN:  She finished 3 days of levaquin for UTI, repeat UA is clean  CXR is looking much better, elevated D-Dimer but no DVT and improving resp. status, need to continue O2 taper and continue with the Lovenox prophylactic dose.  No further recommendation pulmonary wise, continue to encourage pulmonary toileting with TCDB, IS and bronchodilators  DNR/DNI    Discussed with patient    Disposition: Back to nursing home.  Will need to be set up a follow-up with pulmonary.    Adelina Andres MD  04/12/17  2:02 PM    EMR Dragon/Transcription disclaimer:   Much of this encounter note is an electronic transcription/translation of spoken language to printed text. The electronic translation of spoken language may permit erroneous, or at times, nonsensical words or phrases to be inadvertently transcribed; Although I have reviewed the note for such errors, some may still exist.

## 2017-04-12 NOTE — PROGRESS NOTES
Acute Care - Physical Therapy Initial Evaluation  [facility]     Patient Name: Bharti Roche  : 1955  MRN: [id number]  Today's Date: 2017   Onset of Illness/Injury or Date of Surgery Date: 17            Admit Date: 2017     Visit Dx:    ICD-10-CM ICD-9-CM   1. Sepsis, due to unspecified organism A41.9 038.9     995.91   2. Hypoxia R09.02 799.02   3. Urinary tract infection without hematuria, site unspecified N39.0 599.0   4. Fever, unspecified fever cause R50.9 780.60   5. COPD exacerbation J44.1 491.21     Patient Active Problem List   Diagnosis   • Atopic rhinitis   • Anemia   • Aspiration pneumonia   • Chronic obstructive pulmonary disease   • Dysphagia   • Gastroesophageal reflux disease with esophagitis   • Heart murmur   • Hyperglycemia   • Hyperlipidemia   • Hypothyroidism   • Pediculosis capitis   • Nicotine dependence   • Osteoporosis   • Shortness of breath   • Swallowing impaired   • Sepsis     Past Medical History:   Diagnosis Date   • Abdominal pain    • Abscess of chest wall    • Acute bronchitis with bronchospasm    • Asthma    • Breast cancer    • Chronic bronchitis    • Cryptogenic organizing pneumonia    • Diabetes mellitus    • Esophageal reflux    • High blood cholesterol level     reported cholesterol level was high   • Influenza    • Pneumonia      Past Surgical History:   Procedure Laterality Date   • MASTECTOMY RADICAL Bilateral     Breast cancer   • OTHER SURGICAL HISTORY      breats surgery mastectomy          PT ASSESSMENT (last 72 hours)      PT Evaluation       17 1416       Rehab Evaluation    Document Type evaluation  -AR     Subjective Information agree to therapy   declined ambulating further distance multiple times   -AR     Patient Effort, Rehab Treatment good  -AR     Symptoms Noted During/After Treatment fatigue  -AR     General Information    Patient Profile Review yes  -AR     Onset of Illness/Injury or Date of Surgery Date 17  -AR      Precautions/Limitations fall precautions  -AR     Prior Level of Function min assist:   group home, dining dimas for meals.  cleans during day ?work  -AR     Equipment Currently Used at Home none  -AR     Barriers to Rehab none identified  -AR     Living Environment    Lives With --   lives w/  2 other adult women  -AR     Living Arrangements apartment   group home  -AR     Home Accessibility bed and bath are not on the first floor;stairs within home   bedroom upstairs  -AR     Number of Stairs Within Home 15  -AR     Stair Railings at Home inside, present on left side  -AR     Clinical Impression    Patient/Family Goals Statement DC home  -AR     Criteria for Skilled Therapeutic Interventions Met yes  -AR     Pathology/Pathophysiology Noted (Describe Specifically for Each System) musculoskeletal  -AR     Impairments Found (describe specific impairments) aerobic capacity/endurance;gait, locomotion, and balance  -AR     Rehab Potential good, to achieve stated therapy goals  -AR     Vital Signs    O2 Delivery Pre Treatment supplemental O2  -AR     Pain Assessment    Pain Assessment No/denies pain  -AR     Cognitive Assessment/Intervention    Current Cognitive/Communication Assessment impaired  -AR     Orientation Status oriented x 4  -AR     Follows Commands/Answers Questions 100% of the time  -AR     ROM (Range of Motion)    General ROM --   B LE WFL  -AR     MMT (Manual Muscle Testing)    General MMT Assessment --   B LE 4/5  -AR     Bed Mobility, Assessment/Treatment    Bed Mobility, Assistive Device bed rails;head of bed elevated  -AR     Bed Mob, Supine to Sit, [place] supervision required  -AR     Bed Mob, Sit to Supine, [place] supervision required  -AR     Transfer Assessment/Treatment    Transfers, Sit-Stand [place] contact guard assist  -AR     Transfers, Stand-Sit [place] contact guard assist  -AR     Toilet Transfer, [place] contact guard assist  -AR     Gait Assessment/Treatment     Gait, [place] Level contact guard assist  -AR     Gait, Assistive Device --   no UE support  -AR     Gait, Distance (Feet) 30  -AR     Gait, Gait Deviations tracey decreased  -AR     Gait, Safety Issues supplemental O2  -AR     Positioning and Restraints    Pre-Treatment Position in bed  -AR     Post Treatment Position bed   declined sitting in chair   -AR     In Bed supine;call light within reach;encouraged to call for assist;exit alarm on  -AR       User Key  (r) = Recorded By, (t) = Taken By, (c) = Cosigned By    Initials Name Provider Type    EVIE Menezes PT Physical Therapist          Physical Therapy Education     Title: PT OT SLP Therapies (Active)     Topic: Physical Therapy (Active)     Point: Mobility training (Active)    Learning Progress Summary    Learner Readiness Method Response Comment Documented by Status   Patient Acceptance E NR  AR 04/12/17 1429 Active               Point: Home exercise program (Active)    Learning Progress Summary    Learner Readiness Method Response Comment Documented by Status   Patient Acceptance E NR  AR 04/12/17 1429 Active               Point: Body mechanics (Active)    Learning Progress Summary    Learner Readiness Method Response Comment Documented by Status   Patient Acceptance E NR  AR 04/12/17 1429 Active               Point: Precautions (Active)    Learning Progress Summary    Learner Readiness Method Response Comment Documented by Status   Patient Acceptance E NR  AR 04/12/17 1429 Active                      User Key     Initials Effective Dates Name Provider Type Discipline    AR 06/27/16 -  Amina Menezes PT Physical Therapist PT                PT Recommendation and Plan  Anticipated Discharge Disposition: skilled nursing facility, home with assist, home with home health (may benefit from DC to [facility] pending progress)  Planned Therapy Interventions: balance training, bed mobility training, gait training, home exercise program, patient/family  education, stair training, ROM (Range of Motion), strengthening  PT Frequency: 5 times/wk  Plan of Care Review  Plan Of Care Reviewed With: patient  Outcome Summary/Follow up Plan: Pt demonstrates impaired activity tolerance, balance, and gait pattern while ambulating in room; pt declined several times to ambulate further or practice steps.  May benefit from DC to [facility] or home pending progress.            IP PT Goals       04/12/17 1427          Gait Training PT LTG    Gait Training Goal PT LTG, Date Established 04/12/17  -AR      Gait Training Goal PT LTG, Time to Achieve 1 wk  -AR      Gait Training Goal PT LTG, [place] Level conditional independence  -AR      Gait Training Goal PT LTG, Distance to Achieve 150  -AR      Stair Training PT LTG    Stair Training Goal PT LTG, Date Established 04/12/17  -AR      Stair Training Goal PT LTG, Time to Achieve 1 wk  -AR      Stair Training Goal PT LTG, Number of Steps 10  -AR      Stair Training Goal PT LTG, [place] Level contact guard assist  -AR      Stair Training Goal PT LTG, Assist Device 1 handrail  -AR        User Key  (r) = Recorded By, (t) = Taken By, (c) = Cosigned By    Initials Name Provider Type    AR Amina Menezes, PT Physical Therapist                Outcome Measures       04/12/17 1400          How much help from another person do you currently need...    Turning from your back to your side while in flat bed without using bedrails? 4  -AR      Moving from lying on back to sitting on the side of a flat bed without bedrails? 4  -AR      Moving to and from a bed to a chair (including a wheelchair)? 3  -AR      Standing up from a chair using your arms (e.g., wheelchair, bedside chair)? 3  -AR      Climbing 3-5 steps with a railing? 3  -AR      To walk in hospital room? 3  -AR      AM-PAC 6 Clicks Score 20  -AR      Functional Assessment    Outcome Measure Options AM-PAC 6 Clicks Basic Mobility (PT)  -AR        User Key  (r) = Recorded By, (t) = Taken  By, (c) = Cosigned By    Initials Name Provider Type    AR Amina Menezes PT Physical Therapist           Time Calculation:         PT Charges       04/12/17 1429          Time Calculation    Start Time 1401  -AR      Stop Time 1429  -AR      Time Calculation (min) 28 min  -AR      PT Received On 04/12/17  -AR      PT - Next Appointment 04/13/17  -AR      PT Goal Re-Cert Due Date 04/19/17  -AR        User Key  (r) = Recorded By, (t) = Taken By, (c) = Cosigned By    Initials Name Provider Type    EVIE Menezes PT Physical Therapist          Therapy Charges for Today     Code Description Service Date Service Provider Modifiers Qty    52075732854 HC PT EVAL LOW COMPLEXITY 2 4/12/2017 Amina Menezes, PT GP 1    79782204800 HC PT THERAPEUTIC ACT EA 15 MIN 4/12/2017 Amina Menezes PT GP 1          PT G-Codes  Outcome Measure Options: AM-PAC 6 Clicks Basic Mobility (PT)      Amina Menezes PT  4/12/2017

## 2017-04-12 NOTE — NURSING NOTE
Patient inadvertently removed IV top of right hand.  MD notified and Dr. Andres response no new IV to be placed.

## 2017-04-12 NOTE — PLAN OF CARE
Problem: Patient Care Overview (Adult)  Goal: Plan of Care Review    04/12/17 1427   Coping/Psychosocial Response Interventions   Plan Of Care Reviewed With patient   Outcome Evaluation   Outcome Summary/Follow up Plan Pt demonstrates impaired activity tolerance, balance, and gait pattern while ambulating in room; pt declined several times to ambulate further or practice steps. May benefit from DC to [facility] or home pending progress.          Problem: Inpatient Physical Therapy  Goal: Gait Training Goal LTG- PT    04/12/17 1427   Gait Training PT LTG   Gait Training Goal PT LTG, Date Established 04/12/17   Gait Training Goal PT LTG, Time to Achieve 1 wk   Gait Training Goal PT LTG, [place] Level conditional independence   Gait Training Goal PT LTG, Distance to Achieve 150       Goal: Stair Training Goal LTG- PT    04/12/17 1427   Stair Training PT LTG   Stair Training Goal PT LTG, Date Established 04/12/17   Stair Training Goal PT LTG, Time to Achieve 1 wk   Stair Training Goal PT LTG, Number of Steps 10   Stair Training Goal PT LTG, [place] Level contact guard assist   Stair Training Goal PT LTG, Assist Device 1 handrail

## 2017-04-12 NOTE — PLAN OF CARE
Problem: Patient Care Overview (Adult)  Goal: Plan of Care Review  Outcome: Ongoing (interventions implemented as appropriate)    04/11/17 3757   Coping/Psychosocial Response Interventions   Plan Of Care Reviewed With patient   Patient Care Overview   Progress no change   Outcome Evaluation   Outcome Summary/Follow up Plan pt stable but confused pt on 4 liters no c/o of pain pt in iso for droplet precautions will cont to monitor.         Problem: Fall Risk (Adult)  Goal: Absence of Falls  Outcome: Ongoing (interventions implemented as appropriate)    Problem: Respiratory Insufficiency (Adult)  Goal: Acid/Base Balance  Outcome: Ongoing (interventions implemented as appropriate)  Goal: Effective Ventilation  Outcome: Ongoing (interventions implemented as appropriate)

## 2017-04-12 NOTE — PROGRESS NOTES
Continued Stay Note  [facility]     Patient Name: Bharti Roche  MRN: [id number]  Today's Date: 4/12/2017    Admit Date: 4/6/2017          Discharge Plan       04/12/17 1506    Case Management/Social Work Plan    Plan Return to group home    Patient/Family In Agreement With Plan yes    Additional Comments Patient is still on 5 liters [place] O2.  Ambulated 30' with PT.  Spoke to sister Barrie by telephone, she would be interested in [facility] (no beds and do not accept Passport) if SNF needed.  [facility] and list of SNF and HH agencies at bedside for sister.  Spoke with patient at bedside, she hopes to return to group home at [place]. CCP will continue to follow.              Discharge Codes     None            Becky S. [place], RN

## 2017-04-13 NOTE — PROGRESS NOTES
Acute Care - Physical Therapy Treatment Note  [facility]     Patient Name: Bharti Roche  : 1955  MRN: [id number]  Today's Date: 2017  Onset of Illness/Injury or Date of Surgery Date: 17          Admit Date: 2017    Visit Dx:    ICD-10-CM ICD-9-CM   1. Sepsis, due to unspecified organism A41.9 038.9     995.91   2. Hypoxia R09.02 799.02   3. Urinary tract infection without hematuria, site unspecified N39.0 599.0   4. Fever, unspecified fever cause R50.9 780.60   5. COPD exacerbation J44.1 491.21     Patient Active Problem List   Diagnosis   • Atopic rhinitis   • Anemia   • Aspiration pneumonia   • Chronic obstructive pulmonary disease   • Dysphagia   • Gastroesophageal reflux disease with esophagitis   • Heart murmur   • Hyperglycemia   • Hyperlipidemia   • Hypothyroidism   • Pediculosis capitis   • Nicotine dependence   • Osteoporosis   • Shortness of breath   • Swallowing impaired   • Sepsis               Adult Rehabilitation Note       17 1400          Rehab Assessment/Intervention    Discipline physical therapy assistant  -CW      Document Type therapy note (daily note)  -CW      Subjective Information agree to therapy  -CW      Patient Effort, Rehab Treatment adequate  -CW      Precautions/Limitations fall precautions  -CW      Recorded by [CW] Jarrod Taylor      Vital Signs    O2 Delivery Pre Treatment supplemental O2  -CW      Recorded by [CW] Jarrod Taylor      Pain Assessment    Pain Assessment No/denies pain  -CW      Recorded by [CW] Jarrod Taylor      Cognitive Assessment/Intervention    Current Cognitive/Communication Assessment impaired  -CW      Orientation Status oriented x 4  -CW      Follows Commands/Answers Questions 100% of the time;able to follow single-step instructions;needs cueing  -CW      Personal Safety mild impairment;decreased awareness, need for safety;decreased insight to deficits;impulsive  -CW      Personal Safety Interventions fall  prevention program maintained;gait belt;nonskid shoes/slippers when out of bed  -CW      Recorded by [CW] Jarrod Taylor      Bed Mobility, Assessment/Treatment    Bed Mobility, Assistive Device bed rails;head of bed elevated  -CW      Bed Mob, Supine to Sit, [place] supervision required  -CW      Bed Mob, Sit to Supine, [place] supervision required  -CW      Recorded by [CW] Jarrod Taylor      Transfer Assessment/Treatment    Transfers, Sit-Stand [place] stand by assist  -CW      Transfers, Stand-Sit [place] stand by assist  -CW      Transfers, Sit-Stand-Sit, Assist Device rolling walker  -CW      Recorded by [CW] Jarrod Taylor      Gait Assessment/Treatment    Gait, [place] Level contact guard assist  -CW      Gait, Assistive Device rolling walker  -CW      Gait, Distance (Feet) 40  -CW      Gait, Gait Deviations tracey decreased;step length decreased;stride length decreased  -CW      Gait, Safety Issues supplemental O2  -CW      Recorded by [CW] Jarrod Taylor      Positioning and Restraints    Pre-Treatment Position in bed  -CW      Post Treatment Position bed  -CW      In Bed notified nsg;supine;call light within reach;encouraged to call for assist;with other staff  -CW      Recorded by [CW] Jarrod Taylor        User Key  (r) = Recorded By, (t) = Taken By, (c) = Cosigned By    Initials Name Effective Dates     Jarrod Taylor 12/13/16 -                 IP PT Goals       04/12/17 1427          Gait Training PT LTG    Gait Training Goal PT LTG, Date Established 04/12/17  -AR      Gait Training Goal PT LTG, Time to Achieve 1 wk  -AR      Gait Training Goal PT LTG, [place] Level conditional independence  -AR      Gait Training Goal PT LTG, Distance to Achieve 150  -AR      Stair Training PT LTG    Stair Training Goal PT LTG, Date Established 04/12/17  -AR      Stair Training Goal PT LTG, Time to Achieve 1 wk  -AR      Stair Training Goal PT LTG, Number of  Steps 10  -AR      Stair Training Goal PT LTG, [place] Level contact guard assist  -AR      Stair Training Goal PT LTG, Assist Device 1 handrail  -AR        User Key  (r) = Recorded By, (t) = Taken By, (c) = Cosigned By    Initials Name Provider Type    EVIE Menezes PT Physical Therapist          Physical Therapy Education     Title: PT OT SLP Therapies (Done)     Topic: Physical Therapy (Done)     Point: Mobility training (Done)    Learning Progress Summary    Learner Readiness Method Response Comment Documented by Status   Patient Acceptance E,TB DU,VU   04/13/17 1419 Done    Acceptance E [facility] 04/12/17 2244 Done    Acceptance E NR  AR 04/12/17 1429 Active               Point: Home exercise program (Done)    Learning Progress Summary    Learner Readiness Method Response Comment Documented by Status   Patient Acceptance E,TB DU,VU   04/13/17 1419 Done    Acceptance E [facility] 04/12/17 2244 Done    Acceptance E NR  AR 04/12/17 1429 Active               Point: Body mechanics (Done)    Learning Progress Summary    Learner Readiness Method Response Comment Documented by Status   Patient Acceptance E,TB DU,[facility] 04/13/17 1419 Done    Acceptance E [facility] 04/12/17 2244 Done    Acceptance E NR  AR 04/12/17 1429 Active               Point: Precautions (Done)    Learning Progress Summary    Learner Readiness Method Response Comment Documented by Status   Patient Acceptance E,TB DU,VU   04/13/17 1419 Done    Acceptance E [facility] 04/12/17 2244 Done    Acceptance E NR  AR 04/12/17 1429 Active                      User Key     Initials Effective Dates Name Provider Type Discipline    AR 06/27/16 -  Amina Menezes PT Physical Therapist PT     07/06/16 -  Alejandro Barth, RN Registered Nurse Nurse     12/13/16 -  Jarrod Taylor Physical Therapy Assistant PT                    PT Recommendation and Plan  Anticipated Discharge Disposition: skilled nursing facility, home with assist, home with home health (may  benefit from DC to [facility] pending progress)  Planned Therapy Interventions: balance training, bed mobility training, gait training, home exercise program, patient/family education, stair training, ROM (Range of Motion), strengthening  PT Frequency: 5 times/wk  Plan of Care Review  Plan Of Care Reviewed With: patient  Progress: progress towards functional goals is fair  Outcome Summary/Follow up Plan: Pt with cognition issues that impact progress with safety and I          Outcome Measures       04/13/17 1400 04/12/17 1400       How much help from another person do you currently need...    Turning from your back to your side while in flat bed without using bedrails? 4  -CW 4  -AR     Moving from lying on back to sitting on the side of a flat bed without bedrails? 4  -CW 4  -AR     Moving to and from a bed to a chair (including a wheelchair)? 3  -CW 3  -AR     Standing up from a chair using your arms (e.g., wheelchair, bedside chair)? 3  -CW 3  -AR     Climbing 3-5 steps with a railing? 3  -CW 3  -AR     To walk in hospital room? 3  -CW 3  -AR     AM-PAC 6 Clicks Score 20  -CW 20  -AR     Functional Assessment    Outcome Measure Options AM-PAC 6 Clicks Basic Mobility (PT)  -CW AM-PAC 6 Clicks Basic Mobility (PT)  -AR       User Key  (r) = Recorded By, (t) = Taken By, (c) = Cosigned By    Initials Name Provider Type    AR Amina Menezes, PT Physical Therapist    CW Jarrod Taylor Physical Therapy Assistant           Time Calculation:         PT Charges       04/13/17 1420          Time Calculation    Start Time 1409  -CW      Stop Time 1420  -CW      Time Calculation (min) 11 min  -CW      PT Received On 04/13/17  -CW      PT - Next Appointment 04/14/17  -CW        User Key  (r) = Recorded By, (t) = Taken By, (c) = Cosigned By    Initials Name Provider Type    CATHLEEN Taylor Physical Therapy Assistant          Therapy Charges for Today     Code Description Service Date Service Provider Modifiers Qty     06167346549 HC PT THER PROC EA 15 MIN 4/13/2017 Jarrod Taylor GP 1    19729470062 HC PT THER SUPP EA 15 MIN 4/13/2017 Jarrod Taylor GP 1          PT G-Codes  Outcome Measure Options: AM-PAC 6 Clicks Basic Mobility (PT)    Jarrod Taylor  4/13/2017

## 2017-04-13 NOTE — PLAN OF CARE
Problem: Patient Care Overview (Adult)  Goal: Plan of Care Review  Outcome: Ongoing (interventions implemented as appropriate)    04/13/17 1536   Coping/Psychosocial Response Interventions   Plan Of Care Reviewed With patient;spouse;daughter   Outcome Evaluation   Outcome Summary/Follow up Plan Received N.O. for blood cultures and UA d/t Temp. VS and labs monitored. Droplet precautions continue. Tamilflu completed 4/12/17.       Goal: Adult Individualization and Mutuality  Outcome: Ongoing (interventions implemented as appropriate)  Goal: Discharge Needs Assessment  Outcome: Ongoing (interventions implemented as appropriate)    Problem: Fall Risk (Adult)  Goal: Absence of Falls  Outcome: Ongoing (interventions implemented as appropriate)    Problem: Respiratory Insufficiency (Adult)  Goal: Acid/Base Balance  Outcome: Ongoing (interventions implemented as appropriate)  Goal: Effective Ventilation  Outcome: Ongoing (interventions implemented as appropriate)

## 2017-04-13 NOTE — PROGRESS NOTES
PROGRESS NOTE   LOS: 7 days   Patient Care Team:  James Epley, APRN as PCP - General  James Epley, APRN as PCP - Family Medicine    Chief Complaint: altered mental status, improving    Interval History: Patient was admitted on 4/6/17 with altered mental status, fall, decreased O2 sats.  In the ER she was found to be tachycardic with heart rate up to 129 and febrile with a temp of 100.7.  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  She has a history of aspiration and is on a dysphagia diet and has previously refused PEG tube placement.  She was found to have a urinary tract infection and was treated with Levaquin to cover UTI and aspiration.  Chest x-ray showed interstitial changes, but no obvious sign of pneumonia.  She did test positive for influenza B and was treated with Tamiflu.    On 4/7/17 and she was evaluated by speech therapy and found to have moderate oropharyngeal dysphagia without esophageal component was started on nectar thick liquid and puréed diet with medications crushed with puréed.  On 4/8/17 and her respiratory status had deteriorated and she was requiring high levels of oxygen.  She had a chest x-ray that showed extensive pulmonary edema and her IV fluids were discontinued and she was given an extra dose of Lasix.      She did sustain a fall prior to admission and reports that she hit her head, but did not lose consciousness.  CT of head and cervical spine were unremarkable.    Chest x-ray on April 10, 2017 showed significant improvement.  Patient was still on  High flow this morning however when I turned her down to 4 L/m she was still maintaining adequate oxygenation in the low 90s and need to be weaned more aggressively.  On April 12, 2017 patient was in the mid 90s on 4 L nasal cannula and continues to improve. That night she spiked a fever of 101.7 and was diaphoretic and her oxygen was increased  "to 6 LPM.  Her blood pressure has also decreased.    REVIEW OF SYSTEMS:   CARDIOVASCULAR: No chest pain, chest pressure or chest discomfort. No palpitations or edema.   RESPIRATORY: resolved Shortness of breath. No  Cough with no sputum production.    GASTROINTESTINAL: No anorexia, nausea, vomiting or diarrhea. No abdominal pain or blood.    She was having some heartburn which did improve.   She was finally able to have a bowel movement.  She reports it still burns when she urinates  She has been diaphoretic with low-grade fever      Ventilator/Non-Invasive Ventilation Settings     None        Vital Signs  Temp:  [97.7 °F (36.5 °C)-101.7 °F (38.7 °C)] 97.7 °F (36.5 °C)  Heart Rate:  [76-94] 90  Resp:  [18-20] 20  BP: ()/(56-73) 107/73  SpO2:  [90 %-93 %] 91 %  on  Flow (L/min):  [4-8] 6 O2 Device: nasal cannula with humidification    Intake/Output Summary (Last 24 hours) at 04/13/17 1505  Last data filed at 04/13/17 0509   Gross per 24 hour   Intake              650 ml   Output                0 ml   Net              650 ml     Flowsheet Rows         First Filed Value    Admission Height  68\" (172.7 cm) Documented at 04/06/2017 1718    Admission Weight  200 lb (90.7 kg) Documented at 04/06/2017 1718        Last 3 weights    04/06/17  1718 04/06/17  2336 04/09/17  0600   Weight: 200 lb (90.7 kg) 214 lb 14.4 oz (97.5 kg) 205 lb 8 oz (93.2 kg)       Physical Exam:  GEN:  No acute distress, alert and oriented ×3, cooperative, well developed on oxygen per high flow nasal cannula,  EYES:  Sclera clear. No icterus.   ENT:  no oral lesions, resolved thrush, mucous membranes moist  NECK:  Supple, midline trachea, no JVD  LUNGS: Normal chest on inspection, CTA. Diminished breath sounds with bilateral rhonchi anteriorly . Respirations regular, even and unlabored.   CV:  Regular rhythm and rate. Normal S1/S2. No murmurs, gallops, or rubs noted.  ABD: Soft, mild tenderness of epigastric area and non-distended. Normal bowel " sounds. No guarding.  Negative CVA tenderness  EXT:  Moves all extremities well. No cyanosis. No redness. No edema.   Skin: dry, intact, no bleeding  Results Review:        Results from last 7 days  Lab Units 04/11/17  0147 04/10/17  0651 04/07/17  0639 04/06/17  1749   SODIUM mmol/L  --  142 146* 142   POTASSIUM mmol/L 3.8 3.2* 4.1 3.3*   CHLORIDE mmol/L  --  100 109* 100   TOTAL CO2 mmol/L  --  30.4* 25.5 26.9   BUN mg/dL  --  12 9 10   CREATININE mg/dL  --  0.50* 0.50* 0.77   CALCIUM mg/dL  --  8.8 7.8* 9.0   AST (SGOT) U/L  --   --  27 45*   ALT (SGPT) U/L  --   --  17 25       Results from last 7 days  Lab Units 04/06/17  1749   TROPONIN T ng/mL <0.010       Results from last 7 days  Lab Units 04/13/17  1151 04/10/17  0651 04/07/17  0639 04/06/17  1749   WBC 10*3/mm3 6.16 5.36 8.85 6.67   HEMOGLOBIN g/dL 10.9* 10.4* 10.3* 11.5*   HEMATOCRIT % 34.4* 33.3* 33.5* 36.6   PLATELETS 10*3/mm3 122* 113* 111* 124*   NEUTROPHIL % % 55.8  --  83.1* 76.8*   LYMPHOCYTE % % 30.8  --  14.0* 17.8*   MONOCYTES % % 11.7  --  2.6* 5.2   EOSINOPHIL % % 0.3  --  0.0* 0.1*   BASOPHIL % % 0.3  --  0.0 0.1   IMM GRAN % % 1.1*  --  0.3 0.0       Results from last 7 days  Lab Units 04/06/17  1749   INR  1.10               Results from last 7 days  Lab Units 04/10/17  1247 04/08/17  1414 04/06/17  1932   PH, ARTERIAL pH units 7.433 7.462* 7.411   PO2 ART mm Hg 60.8* 54.3* 47.0*   PCO2, ARTERIAL mm Hg 49.3* 45.2* 43.0   HCO3 ART mmol/L 32.9* 32.3* 27.4         Glucose   Date/Time Value Ref Range Status   04/11/2017 2048 100 70 - 130 mg/dL Final       Results from last 7 days  Lab Units 04/13/17  1151 04/10/17  1202 04/06/17  1749   PROCALCITONIN ng/mL 0.09*  --   --    LACTATE mmol/L  --  0.7 1.6       Results from last 7 days  Lab Units 04/06/17  1749   PROBNP pg/mL 157.5   Results for LISET GILLIAM (MRN [id number]) as of 4/11/2017 14:01   Ref. Range 4/10/2017 20:39   D-dimer, Quant Latest Ref Range: 0.00 - 0.49 MCGFEU/mL 3.45 (H)        Results from last 7 days  Lab Units 04/06/17  1759 04/06/17  1752   BLOODCX  No growth at 5 days  --    URINECX   --  >100,000 CFU/mL Escherichia coli*       Results from last 7 days  Lab Units 04/10/17  2046 04/06/17  1752   NITRITE UA  Negative Positive*   WBC UA /HPF  --  6-12*   BACTERIA UA /HPF  --  1+*   SQUAM EPITHEL UA /HPF  --  0-2   URINECX   --  >100,000 CFU/mL Escherichia coli*   Results for LISET GILLIAM (MRN [id number]) as of 4/7/2017 09:24   Ref. Range 8/20/2015 01:14   Strep pneumoniae Ag, URINE Unknown Negative                BLE doppler lower extremity: 4/11/17  Interpretation Summary      · Normal bilateral lower extremity venous duplex                 I reviewed the patient's new clinical results.  I personally viewed and interpreted the patient's imaging results:    CXR 4/10/17 compared to 4/8/17 shows significant improvement of aeration with residual atelectasis vs infiltrate BLL .     Medication Review:     enoxaparin 40 mg Subcutaneous Daily   ipratropium-albuterol 3 mL Nebulization Q4H - RT   nystatin 5 mL Oral 4x Daily            ASSESSMENT:   1. Acute hypoxic respiratory failure  2. Chronic hypercapnic resp. failure, compensated  3. Acute urinary tract infection  4. Influenza B positive  5. Toxic metabolic encephalopathy  6. History of aspiration  7. Mixed obstructive and restrictive lung disease  8. Dysphagia- was on a dysphagia diet and had previously refused PEG tube placement  9. Schizophrenia  10. Suspected obstructive sleep apnea  11. Hypokalemia-improved  12. Hypernatremia  13. Anemia    PLAN:  She spiked a fever last night and has been diaphoretic with low grade fever since. She still has burning of urine and no diarrhea. Unsure of reason for spike in fever.   She finished 3 days of levaquin for UTI, repeat UA was clean, but will recheck  Check blood cultures, procal and cbc  Check CXR  Continue oxygen and wean as improves  Continue home medications as they have not been  addressed yet    Lovenox for dvt prophylaxis  continue to encourage pulmonary toileting with TCDB, IS and bronchodilators  DNR/DNI    Discussed with patient    Disposition: Back to nursing home.  Will need to be set up a follow-up with pulmonary.    Adelina Andres MD  04/13/17  3:05 PM    EMR Dragon/Transcription disclaimer:   Much of this encounter note is an electronic transcription/translation of spoken language to printed text. The electronic translation of spoken language may permit erroneous, or at times, nonsensical words or phrases to be inadvertently transcribed; Although I have reviewed the note for such errors, some may still exist.

## 2017-04-13 NOTE — PLAN OF CARE
Problem: Patient Care Overview (Adult)  Goal: Plan of Care Review  Outcome: Ongoing (interventions implemented as appropriate)    04/13/17 0115   Coping/Psychosocial Response Interventions   Plan Of Care Reviewed With patient   Patient Care Overview   Progress no change   Outcome Evaluation   Outcome Summary/Follow up Plan Pt to use I/S pt to return to group home tamiflu complete pt had BM this shift will cont to monitor.         Problem: Fall Risk (Adult)  Goal: Absence of Falls  Outcome: Ongoing (interventions implemented as appropriate)    Problem: Respiratory Insufficiency (Adult)  Goal: Acid/Base Balance  Outcome: Ongoing (interventions implemented as appropriate)  Goal: Effective Ventilation  Outcome: Ongoing (interventions implemented as appropriate)

## 2017-04-14 NOTE — PAYOR COMM NOTE
"Liset Roche (61 y.o. Female)     PLEASE SEE ATTACHED CLINICAL REVIEW. --     REF#[id number]--    AVILA MIGHT HAVE DONE THIS YESTERDAY.    THANK YOU    HAILEE HARKINS LPN, [facility]          [phone]  (F)    Date of Birth Social Security Number Address Home Phone MRN    1955  [id number] [facility] [address] [place], [place] [id number] [phone] [id number]    Congregational Marital Status          [place]ru[place] Single       Admission Date Admission Type Admitting Provider Attending Provider Department, Room/Bed    4/6/17 Emergency Malini Thornton MD Jambeih, Rami, MD [address], 613/1    Discharge Date Discharge Disposition Discharge Destination                      Attending Provider: Malini Thornton MD     Allergies:  No Known Allergies    Isolation:  Droplet   Infection:  MRSA/History Only (04/07/17), Influenza (04/06/17)   Code Status:  Conditional    Ht:  60.3\" (153.2 cm)   Wt:  205 lb 8 oz (93.2 kg)    Admission Cmt:  None   Principal Problem:  None                Active Insurance as of 4/6/2017     Primary Coverage     Payor Plan Insurance Group Employer/Plan Group    PAS[employer]ORT PASSPORT      Payor Plan Address Payor Plan Phone Number Effective From Effective To    [address] [phone] 1/1/1998     [place] [address]       Subscriber Name Subscriber Birth Date Member ID       LISET ROCHE 1955 [id number]                 Emergency Contacts      (Rel.) Home Phone Work Phone Mobile Phone    [place] -- -- [phone]    LymanStefanymavis (Sister) -- -- [phone]    Heather Leyva () -- -- [phone]            Vital Signs (last 24 hours)       04/13 0700  -  04/14 0659 04/14 0700  -  04/14 0755   Most Recent    Temp (°F) 97.7 -  (!)101.8       (!) 100.9 (38.3)  Comment: KARTHIKEYAN Allen notified    Heart Rate 69 -  94       85    Resp 18 -  20       18    BP 96/60 -  119/68       119/68    SpO2 (%) 90 -  97       96          Intake & Output (last " "day)       04/13 0701 - 04/14 0700 04/14 0701 - 04/15 0700    P.O.      Total Intake(mL/kg)      Net              Unmeasured Urine Occurrence 1 x         Lines, Drains & Airways    Active LDAs     None                Hospital Medications (active)       Dose Frequency Start End    acetaminophen (TYLENOL) tablet 650 mg 650 mg Every 6 Hours PRN 4/8/2017     Sig - Route: Take 2 tablets by mouth Every 6 (Six) Hours As Needed for Mild Pain (1-3). - Oral    enoxaparin (LOVENOX) syringe 40 mg 40 mg Daily 4/6/2017     Sig - Route: Inject 0.4 mL under the skin Daily. - Subcutaneous    ipratropium-albuterol (DUO-NEB) nebulizer solution 3 mL 3 mL Every 4 Hours - RT 4/6/2017     Sig - Route: Take 3 mL by nebulization Every 4 (Four) Hours. - Nebulization    nystatin (MYCOSTATIN) 460277 UNIT/ML suspension 500,000 Units 5 mL 4 Times Daily 4/10/2017     Sig - Route: Take 5 mL by mouth 4 (Four) Times a Day. - Oral    potassium chloride (KLOR-CON) packet 40 mEq 40 mEq As Needed 4/10/2017     Sig - Route: Take 40 mEq by mouth As Needed (potassium replacement, see admin instructions). - Oral    potassium chloride (MICRO-K) CR capsule 40 mEq 40 mEq As Needed 4/10/2017     Sig - Route: Take 4 capsules by mouth As Needed (potassium replacement.  see admin instructions). - Oral    Linked Group 1:  \"Or\" Linked Group Details        potassium chloride 10 mEq in 100 mL IVPB 10 mEq Every 1 Hour PRN 4/10/2017     Sig - Route: Infuse 100 mL into a venous catheter Every 1 (One) Hour As Needed (potassium protocol PERIPHERAL - see admin instructions). - Intravenous    Linked Group 1:  \"Or\" Linked Group Details        sodium chloride 0.9 % flush 1-10 mL 1-10 mL As Needed 4/6/2017     Sig - Route: Infuse 1-10 mL into a venous catheter As Needed for Line Care. - Intravenous    sodium chloride 0.9 % flush 10 mL 10 mL As Needed 4/6/2017     Sig - Route: Infuse 10 mL into a venous catheter As Needed for Line Care. - Intravenous    Cosign for Ordering: " Accepted by Darby Palacios MD on 4/6/2017  7:54 PM          Lab Results (last 24 hours)     Procedure Component Value Units Date/Time    CBC & Differential [[id number]] Collected:  04/13/17 1151    Specimen:  Blood Updated:  04/13/17 1236    Narrative:       The following orders were created for panel order CBC & Differential.  Procedure                               Abnormality         Status                     ---------                               -----------         ------                     CBC Auto Differential[[id number]]         Abnormal            Final result                 Please view results for these tests on the individual orders.    CBC Auto Differential [[id number]]  (Abnormal) Collected:  04/13/17 1151    Specimen:  Blood Updated:  04/13/17 1236     WBC 6.16 10*3/mm3      RBC 3.98 10*6/mm3      Hemoglobin 10.9 (L) g/dL      Hematocrit 34.4 (L) %      MCV 86.4 fL      MCH 27.4 pg      MCHC 31.7 (L) g/dL      RDW 16.8 (H) %      RDW-SD 53.3 fl      MPV 9.8 fL      Platelets 122 (L) 10*3/mm3      Neutrophil % 55.8 %      Lymphocyte % 30.8 %      Monocyte % 11.7 %      Eosinophil % 0.3 %      Basophil % 0.3 %      Immature Grans % 1.1 (H) %      Neutrophils, Absolute 3.43 10*3/mm3      Lymphocytes, Absolute 1.90 10*3/mm3      Monocytes, Absolute 0.72 10*3/mm3      Eosinophils, Absolute 0.02 10*3/mm3      Basophils, Absolute 0.02 10*3/mm3      Immature Grans, Absolute 0.07 (H) 10*3/mm3      nRBC 0.0 /100 WBC     Procalcitonin [[id number]]  (Abnormal) Collected:  04/13/17 1151    Specimen:  Blood Updated:  04/13/17 1250     Procalcitonin 0.09 (L) ng/mL     Narrative:       As a Marker for Sepsis (Non-Neonates):   1. <0.5 ng/mL represents a low risk of severe sepsis and/or septic shock.  1. >2 ng/mL represents a high risk of severe sepsis and/or septic shock.    As a Marker for Lower Respiratory Tract Infections that require antibiotic therapy:  PCT on Admission     Antibiotic Therapy             6-12 Hrs  "later  > 0.5                Strongly Recommended            >0.25 - <0.5         Recommended  0.1 - 0.25           Discouraged                   Remeasure/reassess PCT  <0.1                 Strongly Discouraged          Remeasure/reassess PCT      As 28 day mortality risk marker: \"Change in Procalcitonin Result\" (> 80 % or <=80 %) if Day 0 (or Day 1) and Day 4 values are available. Refer to http://www.[facility]-pct-calculator.com/   Change in PCT <=80 %   A decrease of PCT levels below or equal to 80 % defines a positive change in PCT test result representing a higher risk for 28-day all-cause mortality of patients diagnosed with severe sepsis or septic shock.  Change in PCT > 80 %   A decrease of PCT levels of more than 80 % defines a negative change in PCT result representing a lower risk for 28-day all-cause mortality of patients diagnosed with severe sepsis or septic shock.                Urinalysis With / Culture If Indicated [[id number]]  (Abnormal) Collected:  04/13/17 1711    Specimen:  Urine from Urine, Clean Catch Updated:  04/13/17 1744     Color, UA Dark Yellow (A)     Appearance, UA Cloudy (A)     pH, UA 5.5     Specific Gravity, UA 1.027     Glucose, UA Negative     Ketones, UA Negative     Bilirubin, UA Negative     Blood, UA Negative     Protein, UA Negative     Leuk Esterase, UA Negative     Nitrite, UA Negative     Urobilinogen, UA 0.2 E.U./dL    Narrative:       Urine microscopic not indicated.    Blood Culture [[id number]]  (Normal) Collected:  04/13/17 1150    Specimen:  Blood from Hand, Left Updated:  04/14/17 0101     Blood Culture No growth at less than 24 hours    Blood Culture [[id number]]  (Normal) Collected:  04/13/17 1156    Specimen:  Blood from Hand, Right Updated:  04/14/17 0101     Blood Culture No growth at less than 24 hours        Imaging Results (last 24 hours)     ** No results found for the last 24 hours. **        ECG/EMG Results (last 24 hours)     ** No results found for the last 24 " hours. **        Orders (last 24 hrs)     Start     Ordered    04/13/17 1116  Blood Culture  Once      04/13/17 1115    04/13/17 1116  Blood Culture  Once     Comments:  From a different site than #1.    04/13/17 1115    04/13/17 1115  Urinalysis With / Culture If Indicated  Once      04/13/17 1115    04/13/17 1105  Procalcitonin  Once      04/13/17 1104    04/13/17 1105  CBC & Differential  Once      04/13/17 1104    04/13/17 1105  CBC Auto Differential  PROCEDURE ONCE      04/13/17 1104    04/10/17 1800  nystatin (MYCOSTATIN) 910423 UNIT/ML suspension 500,000 Units  4 Times Daily      04/10/17 1431    04/10/17 1800  Dietary Nutrition Supplements Magic Cup  Daily With Breakfast, Lunch & Dinner      04/10/17 1524    04/10/17 1440  potassium chloride (KLOR-CON) packet 40 mEq  As Needed      04/10/17 1440    04/10/17 1336  potassium chloride (MICRO-K) CR capsule 40 mEq  As Needed      04/10/17 1336    04/10/17 1336  potassium chloride 10 mEq in 100 mL IVPB  Every 1 Hour PRN      04/10/17 1336    04/08/17 0343  acetaminophen (TYLENOL) tablet 650 mg  Every 6 Hours PRN      04/08/17 0345    04/06/17 2330  ipratropium-albuterol (DUO-NEB) nebulizer solution 3 mL  Every 4 Hours - RT      04/06/17 2148    04/06/17 2016  enoxaparin (LOVENOX) syringe 40 mg  Daily      04/06/17 2014 04/06/17 2012  sodium chloride 0.9 % flush 1-10 mL  As Needed      04/06/17 2014 04/06/17 1858  Oxygen Therapy- Nasal Cannula; 2 LPM; Titrate for SPO2: equal to or greater than, 92%  As Needed,   Status:  Canceled      04/06/17 1858    04/06/17 1720  Oxygen Therapy- Nasal Cannula; 2 LPM; Titrate for SPO2: equal to or greater than, 92%  As Needed,   Status:  Canceled      04/06/17 1721    04/06/17 1720  sodium chloride 0.9 % flush 10 mL  As Needed      04/06/17 1721    Unscheduled  Suction Deep Laryngeal  As Needed      04/06/17 2150    --  benztropine (COGENTIN) 1 MG tablet  2 Times Daily      04/06/17 5052          Operative/Procedure Notes  (last 24 hours) (Notes from 4/13/2017  7:56 AM through 4/14/2017  7:56 AM)     No notes of this type exist for this encounter.           Physician Progress Notes (last 24 hours) (Notes from 4/13/2017  7:56 AM through 4/14/2017  7:56 AM)      Adelina Andres MD at 4/13/2017 11:11 AM  Version 1 of 1           PROGRESS NOTE   LOS: 7 days   Patient Care Team:  James Epley, APRN as PCP - General  James Epley, APRN as PCP - Family Medicine    Chief Complaint: altered mental status, improving    Interval History: Patient was admitted on 4/6/17 with altered mental status, fall, decreased O2 sats.  In the ER she was found to be tachycardic with heart rate up to 129 and febrile with a temp of 100.7.  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  She has a history of aspiration and is on a dysphagia diet and has previously refused PEG tube placement.  She was found to have a urinary tract infection and was treated with Levaquin to cover UTI and aspiration.  Chest x-ray showed interstitial changes, but no obvious sign of pneumonia.  She did test positive for influenza B and was treated with Tamiflu.    On 4/7/17 and she was evaluated by speech therapy and found to have moderate oropharyngeal dysphagia without esophageal component was started on nectar thick liquid and puréed diet with medications crushed with puréed.  On 4/8/17 and her respiratory status had deteriorated and she was requiring high levels of oxygen.  She had a chest x-ray that showed extensive pulmonary edema and her IV fluids were discontinued and she was given an extra dose of Lasix.      She did sustain a fall prior to admission and reports that she hit her head, but did not lose consciousness.  CT of head and cervical spine were unremarkable.    Chest x-ray on April 10, 2017 showed significant improvement.  Patient was still on  High flow this morning however when I  "turned her down to 4 L/m she was still maintaining adequate oxygenation in the low 90s and need to be weaned more aggressively.  On April 12, 2017 patient was in the mid 90s on 4 L nasal cannula and continues to improve. That night she spiked a fever of 101.7 and was diaphoretic and her oxygen was increased to 6 LPM.  Her blood pressure has also decreased.    REVIEW OF SYSTEMS:   CARDIOVASCULAR: No chest pain, chest pressure or chest discomfort. No palpitations or edema.   RESPIRATORY: resolved Shortness of breath. No  Cough with no sputum production.    GASTROINTESTINAL: No anorexia, nausea, vomiting or diarrhea. No abdominal pain or blood.    She was having some heartburn which did improve.   She was finally able to have a bowel movement.  She reports it still burns when she urinates  She has been diaphoretic with low-grade fever      Ventilator/Non-Invasive Ventilation Settings     None        Vital Signs  Temp:  [97.7 °F (36.5 °C)-101.7 °F (38.7 °C)] 97.7 °F (36.5 °C)  Heart Rate:  [76-94] 90  Resp:  [18-20] 20  BP: ()/(56-73) 107/73  SpO2:  [90 %-93 %] 91 %  on  Flow (L/min):  [4-8] 6 O2 Device: nasal cannula with humidification    Intake/Output Summary (Last 24 hours) at 04/13/17 1505  Last data filed at 04/13/17 0509   Gross per 24 hour   Intake              650 ml   Output                0 ml   Net              650 ml     Flowsheet Rows         First Filed Value    Admission Height  68\" (172.7 cm) Documented at 04/06/2017 1718    Admission Weight  200 lb (90.7 kg) Documented at 04/06/2017 1718        Last 3 weights    04/06/17  1718 04/06/17  2336 04/09/17  0600   Weight: 200 lb (90.7 kg) 214 lb 14.4 oz (97.5 kg) 205 lb 8 oz (93.2 kg)       Physical Exam:  GEN:  No acute distress, alert and oriented ×3, cooperative, well developed on oxygen per high flow nasal cannula,  EYES:  Sclera clear. No icterus.   ENT:  no oral lesions, resolved thrush, mucous membranes moist  NECK:  Supple, midline trachea, no " JVD  LUNGS: Normal chest on inspection, CTA. Diminished breath sounds with bilateral rhonchi anteriorly . Respirations regular, even and unlabored.   CV:  Regular rhythm and rate. Normal S1/S2. No murmurs, gallops, or rubs noted.  ABD: Soft, mild tenderness of epigastric area and non-distended. Normal bowel sounds. No guarding.  Negative CVA tenderness  EXT:  Moves all extremities well. No cyanosis. No redness. No edema.   Skin: dry, intact, no bleeding  Results Review:        Results from last 7 days  Lab Units 04/11/17  0147 04/10/17  0651 04/07/17  0639 04/06/17  1749   SODIUM mmol/L  --  142 146* 142   POTASSIUM mmol/L 3.8 3.2* 4.1 3.3*   CHLORIDE mmol/L  --  100 109* 100   TOTAL CO2 mmol/L  --  30.4* 25.5 26.9   BUN mg/dL  --  12 9 10   CREATININE mg/dL  --  0.50* 0.50* 0.77   CALCIUM mg/dL  --  8.8 7.8* 9.0   AST (SGOT) U/L  --   --  27 45*   ALT (SGPT) U/L  --   --  17 25       Results from last 7 days  Lab Units 04/06/17  1749   TROPONIN T ng/mL <0.010       Results from last 7 days  Lab Units 04/13/17  1151 04/10/17  0651 04/07/17  0639 04/06/17  1749   WBC 10*3/mm3 6.16 5.36 8.85 6.67   HEMOGLOBIN g/dL 10.9* 10.4* 10.3* 11.5*   HEMATOCRIT % 34.4* 33.3* 33.5* 36.6   PLATELETS 10*3/mm3 122* 113* 111* 124*   NEUTROPHIL % % 55.8  --  83.1* 76.8*   LYMPHOCYTE % % 30.8  --  14.0* 17.8*   MONOCYTES % % 11.7  --  2.6* 5.2   EOSINOPHIL % % 0.3  --  0.0* 0.1*   BASOPHIL % % 0.3  --  0.0 0.1   IMM GRAN % % 1.1*  --  0.3 0.0       Results from last 7 days  Lab Units 04/06/17  1749   INR  1.10               Results from last 7 days  Lab Units 04/10/17  1247 04/08/17  1414 04/06/17  1932   PH, ARTERIAL pH units 7.433 7.462* 7.411   PO2 ART mm Hg 60.8* 54.3* 47.0*   PCO2, ARTERIAL mm Hg 49.3* 45.2* 43.0   HCO3 ART mmol/L 32.9* 32.3* 27.4         Glucose   Date/Time Value Ref Range Status   04/11/2017 2048 100 70 - 130 mg/dL Final       Results from last 7 days  Lab Units 04/13/17  1151 04/10/17  1202 04/06/17  1745    PROCALCITONIN ng/mL 0.09*  --   --    LACTATE mmol/L  --  0.7 1.6       Results from last 7 days  Lab Units 04/06/17  1749   PROBNP pg/mL 157.5   Results for LISET GILLIAM (MRN [id number]) as of 4/11/2017 14:01   Ref. Range 4/10/2017 20:39   D-dimer, Quant Latest Ref Range: 0.00 - 0.49 MCGFEU/mL 3.45 (H)       Results from last 7 days  Lab Units 04/06/17  1759 04/06/17  1752   BLOODCX  No growth at 5 days  --    URINECX   --  >100,000 CFU/mL Escherichia coli*       Results from last 7 days  Lab Units 04/10/17  2046 04/06/17  1752   NITRITE UA  Negative Positive*   WBC UA /HPF  --  6-12*   BACTERIA UA /HPF  --  1+*   SQUAM EPITHEL UA /HPF  --  0-2   URINECX   --  >100,000 CFU/mL Escherichia coli*   Results for LISET GILLIAM (MRN [id number]) as of 4/7/2017 09:24   Ref. Range 8/20/2015 01:14   Strep pneumoniae Ag, URINE Unknown Negative                BLE doppler lower extremity: 4/11/17  Interpretation Summary      · Normal bilateral lower extremity venous duplex                 I reviewed the patient's new clinical results.  I personally viewed and interpreted the patient's imaging results:    CXR 4/10/17 compared to 4/8/17 shows significant improvement of aeration with residual atelectasis vs infiltrate BLL .     Medication Review:     enoxaparin 40 mg Subcutaneous Daily   ipratropium-albuterol 3 mL Nebulization Q4H - RT   nystatin 5 mL Oral 4x Daily            ASSESSMENT:   1. Acute hypoxic respiratory failure  2. Chronic hypercapnic resp. failure, compensated  3. Acute urinary tract infection  4. Influenza B positive  5. Toxic metabolic encephalopathy  6. History of aspiration  7. Mixed obstructive and restrictive lung disease  8. Dysphagia- was on a dysphagia diet and had previously refused PEG tube placement  9. Schizophrenia  10. Suspected obstructive sleep apnea  11. Hypokalemia-improved  12. Hypernatremia  13. Anemia    PLAN:  She spiked a fever last night and has been diaphoretic with low grade  fever since. She still has burning of urine and no diarrhea. Unsure of reason for spike in fever.   She finished 3 days of levaquin for UTI, repeat UA was clean, but will recheck  Check blood cultures, procal and cbc  Check CXR  Continue oxygen and wean as improves  Continue home medications as they have not been addressed yet    Lovenox for dvt prophylaxis  continue to encourage pulmonary toileting with TCDB, IS and bronchodilators  DNR/DNI    Discussed with patient    Disposition: Back to nursing home.  Will need to be set up a follow-up with pulmonary.    Adelina Andres MD  04/13/17  3:05 PM    EMR Dragon/Transcription disclaimer:   Much of this encounter note is an electronic transcription/translation of spoken language to printed text. The electronic translation of spoken language may permit erroneous, or at times, nonsensical words or phrases to be inadvertently transcribed; Although I have reviewed the note for such errors, some may still exist.        Electronically signed by Adelina Andres MD at 4/13/2017  3:06 PM     Patient Vitals for the past 24 hrs:   BP Temp Temp src Pulse Resp SpO2   04/14/17 0659 - - - 85 18 96 %   04/14/17 0240 - - - 69 20 97 %   04/13/17 2336 119/68 (!) 100.9 °F (38.3 °C) Oral 94 20 94 %   04/13/17 2251 - - - 86 18 92 %   04/13/17 1915 - - - 88 18 94 %   04/13/17 1906 112/72 (!) 101.8 °F (38.8 °C) Oral 87 18 95 %   04/13/17 1703 - 99.8 °F (37.7 °C) Oral - - -   04/13/17 1547 - - - 83 18 92 %   04/13/17 1417 107/73 97.7 °F (36.5 °C) Oral 90 20 -   04/13/17 1146 - - - 80 20 91 %   04/13/17 1123 104/56 99 °F (37.2 °C) Oral 85 18 93 %   04/13/17 1037 - - - - - 93 %   04/13/17 0815 - - - 83 20 90 %

## 2017-04-14 NOTE — PROGRESS NOTES
Discharge Planning Assessment  [facility]     Patient Name: Bharti Roche  MRN: [id number]  Today's Date: 4/14/2017    Admit Date: 4/6/2017          Discharge Needs Assessment     None            Discharge Plan       04/14/17 6812    Case Management/Social Work Plan    Plan return to group home    Patient/Family In Agreement With Plan yes    Additional Comments Patient now ambulating 200ft with PT and it appears plan is to try stairs next.  Spoke with sister and advised her that patient has made a lot of progress and likely would not qualify for SNF under [employer].  She vebalized understanding and confirmed that plan is for patient to return to group home.  CCP will follow.         Discharge Placement     No information found                Demographic Summary     None            Functional Status     None            Psychosocial     None            Abuse/Neglect     None            Legal     None            Substance Abuse     None            Patient Forms     None          Cat Cyr RN

## 2017-04-14 NOTE — PLAN OF CARE
Problem: Patient Care Overview (Adult)  Goal: Plan of Care Review    04/14/17 1526   Patient Care Overview   Progress progress toward functional goals as expected, try stairs       04/14/17 1526   Patient Care Overview

## 2017-04-14 NOTE — PLAN OF CARE
Problem: Patient Care Overview (Adult)  Goal: Plan of Care Review  Outcome: Ongoing (interventions implemented as appropriate)    04/14/17 1006   Coping/Psychosocial Response Interventions   Plan Of Care Reviewed With patient   Patient Care Overview   Progress improving   Outcome Evaluation   Outcome Summary/Follow up Plan O2 3.5 L NC, pt stable, no fever today , possible d/c tomorrow back to group home. bld cx pending. pt w/o complaints at this time,       Goal: Discharge Needs Assessment  Outcome: Ongoing (interventions implemented as appropriate)    Problem: Fall Risk (Adult)  Goal: Absence of Falls  Outcome: Ongoing (interventions implemented as appropriate)

## 2017-04-14 NOTE — PLAN OF CARE
Problem: Patient Care Overview (Adult)  Goal: Plan of Care Review  Outcome: Ongoing (interventions implemented as appropriate)    04/14/17 0314   Coping/Psychosocial Response Interventions   Plan Of Care Reviewed With patient   Patient Care Overview   Progress progress toward functional goals as expected   Outcome Evaluation   Outcome Summary/Follow up Plan Pt had slight low grade fever and Blood cultures were ordered pt also has had slight fever this shift Pt iis in ISO for flu Tamifflu is complte. Waiting for results will cont to monitor.         Problem: Fall Risk (Adult)  Goal: Absence of Falls  Outcome: Ongoing (interventions implemented as appropriate)    Problem: Respiratory Insufficiency (Adult)  Goal: Acid/Base Balance  Outcome: Ongoing (interventions implemented as appropriate)  Goal: Effective Ventilation  Outcome: Ongoing (interventions implemented as appropriate)

## 2017-04-14 NOTE — PROGRESS NOTES
Acute Care - Physical Therapy Treatment Note  [facility]     Patient Name: Bharti Roche  : 1955  MRN: [id number]  Today's Date: 2017  Onset of Illness/Injury or Date of Surgery Date: 17          Admit Date: 2017    Visit Dx:    ICD-10-CM ICD-9-CM   1. Sepsis, due to unspecified organism A41.9 038.9     995.91   2. Hypoxia R09.02 799.02   3. Urinary tract infection without hematuria, site unspecified N39.0 599.0   4. Fever, unspecified fever cause R50.9 780.60   5. COPD exacerbation J44.1 491.21     Patient Active Problem List   Diagnosis   • Atopic rhinitis   • Anemia   • Aspiration pneumonia   • Chronic obstructive pulmonary disease   • Dysphagia   • Gastroesophageal reflux disease with esophagitis   • Heart murmur   • Hyperglycemia   • Hyperlipidemia   • Hypothyroidism   • Pediculosis capitis   • Nicotine dependence   • Osteoporosis   • Shortness of breath   • Swallowing impaired   • Sepsis               Adult Rehabilitation Note       17 1522 17 1400       Rehab Assessment/Intervention    Discipline physical therapy assistant  -JAGDISH physical therapy assistant  -     Document Type  therapy note (daily note)  -CW     Subjective Information  agree to therapy  -CW     Patient Effort, Rehab Treatment  adequate  -CW     Precautions/Limitations fall precautions  -JAGDISH fall precautions  -CW     Recorded by [JW] Ayaka Rodriguez PTA [CW] Jarrod Taylor     Vital Signs    Pre SpO2 (%) 90  -JW      O2 Delivery Pre Treatment supplemental O2  -JAGDISH supplemental O2  -CW     Post SpO2 (%) 94  -JW      Recorded by [JAGDISH] Ayaka Rodriguez PTA [CW] Jarrod Taylor     Pain Assessment    Pain Assessment No/denies pain  -JAGDISH No/denies pain  -CW     Recorded by [JW] Ayaka Rodriguez PTA [CW] Jarrod Taylor     Cognitive Assessment/Intervention    Current Cognitive/Communication Assessment  impaired  -CW     Orientation Status  oriented x 4  -CW     Follows Commands/Answers Questions  100% of the time  - 100% of the time;able to follow single-step instructions;needs cueing  -     Personal Safety mild impairment  - mild impairment;decreased awareness, need for safety;decreased insight to deficits;impulsive  -     Personal Safety Interventions fall prevention program maintained;gait belt  - fall prevention program maintained;gait belt;nonskid shoes/slippers when out of bed  -CW     Recorded by [JW] Ayaka Rodriguez PTA [CW] Jarrod Taylor     Bed Mobility, Assessment/Treatment    Bed Mobility, Assistive Device  bed rails;head of bed elevated  -     Bed Mob, Supine to Sit, [place] --  - supervision required  -CW     Bed Mob, Sit to Supine, [place]  supervision required  -CW     Recorded by [] Ayaka Rodriguez PTA [] Jarrod Taylor     Transfer Assessment/Treatment    Transfers, Sit-Stand [place] stand by assist;conditional independence  - stand by assist  -     Transfers, Stand-Sit [place] stand by assist;conditional independence  - stand by assist  -     Transfers, Sit-Stand-Sit, Assist Device --  - rolling walker  -     Recorded by [] Ayaka Rodriguez PTA [CW] Jarrod Taylor     Gait Assessment/Treatment    Gait, [place] Level  contact guard assist  -     Gait, Assistive Device --  - rolling walker  -     Gait, Distance (Feet) 200  - 40  -CW     Gait, Gait Deviations step length decreased   uneven tracey  - tracey decreased;step length decreased;stride length decreased  -     Gait, Safety Issues supplemental O2  - supplemental O2  -     Gait, Comment no LOB w/o AD  -JW      Recorded by [] Ayaka Rodriguez PTA [] Jarrod Taylor     Positioning and Restraints    Pre-Treatment Position sitting in chair/recliner  - in bed  -CW     Post Treatment Position bed  -JW bed  -CW     In Bed supine;call light within reach;encouraged to call for assist;exit alarm on;notified nsg  - notified nsg;supine;call  light within reach;encouraged to call for assist;with other staff  -CW     Recorded by [JW] Ayaka Rodriguez PTA [CW] Jarrod Taylor       User Key  (r) = Recorded By, (t) = Taken By, (c) = Cosigned By    Initials Name Effective Dates    JAGDISH Rodriguez PTA 02/18/16 -     CW Jarrod Taylor 12/13/16 -                 IP PT Goals       04/12/17 1427          Gait Training PT LTG    Gait Training Goal PT LTG, Date Established 04/12/17  -AR      Gait Training Goal PT LTG, Time to Achieve 1 wk  -AR      Gait Training Goal PT LTG, [place] Level conditional independence  -AR      Gait Training Goal PT LTG, Distance to Achieve 150  -AR      Stair Training PT LTG    Stair Training Goal PT LTG, Date Established 04/12/17  -AR      Stair Training Goal PT LTG, Time to Achieve 1 wk  -AR      Stair Training Goal PT LTG, Number of Steps 10  -AR      Stair Training Goal PT LTG, [place] Level contact guard assist  -AR      Stair Training Goal PT LTG, Assist Device 1 handrail  -AR        User Key  (r) = Recorded By, (t) = Taken By, (c) = Cosigned By    Initials Name Provider Type    AR Amina Clif, PT Physical Therapist          Physical Therapy Education     Title: PT OT SLP Therapies (Done)     Topic: Physical Therapy (Done)     Point: Mobility training (Done)    Learning Progress Summary    Learner Readiness Method Response Comment Documented by Status   Patient Acceptance E [facility] 04/14/17 1525 Done    Acceptance E [facility] 04/14/17 0037 Done    Acceptance E,JAS CAO   04/13/17 1419 Done    Acceptance E VU  [facility] 04/12/17 2244 Done    Acceptance E NR  AR 04/12/17 1429 Active               Point: Home exercise program (Done)    Learning Progress Summary    Learner Readiness Method Response Comment Documented by Status   Patient Acceptance E VU  1 04/14/17 0037 Done    Acceptance E,TB JAS CASTILLO  CW 04/13/17 1419 Done    Acceptance E VU  1 04/12/17 2244 Done    Acceptance E NR  AR 04/12/17 1429 Active                Point: Body mechanics (Done)    Learning Progress Summary    Learner Readiness Method Response Comment Documented by Status   Patient Acceptance E VU  JW1 04/14/17 0037 Done    Acceptance E,TB DU,VU  CW 04/13/17 1419 Done    Acceptance E VU  JW1 04/12/17 2244 Done    Acceptance E NR  AR 04/12/17 1429 Active               Point: Precautions (Done)    Learning Progress Summary    Learner Readiness Method Response Comment Documented by Status   Patient Acceptance E VU  JW1 04/14/17 0037 Done    Acceptance E,TB DU,VU  CW 04/13/17 1419 Done    Acceptance E VU  JW1 04/12/17 2244 Done    Acceptance E NR  AR 04/12/17 1429 Active                      User Key     Initials Effective Dates Name Provider Type Discipline     02/18/16 -  Ayaka Rodriguez, PTA Physical Therapy Assistant PT    AR 06/27/16 -  Amina Menezes, PT Physical Therapist PT    [facility] 07/06/16 -  Alejandro Barth RN Registered Nurse Nurse     12/13/16 -  Jarrod Taylor Physical Therapy Assistant PT                    PT Recommendation and Plan  Anticipated Discharge Disposition: skilled nursing facility, home with assist, home with home health (may benefit from DC to [facility] pending progress)  Planned Therapy Interventions: balance training, bed mobility training, gait training, home exercise program, patient/family education, stair training, ROM (Range of Motion), strengthening  PT Frequency: 5 times/wk  Plan of Care Review  Progress: progress toward functional goals as expected          Outcome Measures       04/14/17 1500 04/13/17 1400 04/12/17 1400    How much help from another person do you currently need...    Turning from your back to your side while in flat bed without using bedrails? 4  -JW 4  -CW 4  -AR    Moving from lying on back to sitting on the side of a flat bed without bedrails? 4  -JW 4  -CW 4  -AR    Moving to and from a bed to a chair (including a wheelchair)? 3  -JW 3  -CW 3  -AR    Standing up from a chair using your arms  (e.g., wheelchair, bedside chair)? 4  - 3  -CW 3  -AR    Climbing 3-5 steps with a railing? 3  -JW 3  -CW 3  -AR    To walk in hospital room? 3  -JW 3  -CW 3  -AR    AM-PAC 6 Clicks Score 21  - 20  -CW 20  -AR    Functional Assessment    Outcome Measure Options  AM-PAC 6 Clicks Basic Mobility (PT)  -CW AM-PAC 6 Clicks Basic Mobility (PT)  -AR      User Key  (r) = Recorded By, (t) = Taken By, (c) = Cosigned By    Initials Name Provider Type    JAGDISH Rodriguez PTA Physical Therapy Assistant    AR Amina Menezes, PT Physical Therapist    CW Jarrod Taylor Physical Therapy Assistant           Time Calculation:         PT Charges       04/14/17 1521          Time Calculation    Start Time 1445  -      Stop Time 1500  -      Time Calculation (min) 15 min  -JW      PT Received On 04/14/17  -        User Key  (r) = Recorded By, (t) = Taken By, (c) = Cosigned By    Initials Name Provider Type    JAGDISH Rodriguez PTA Physical Therapy Assistant          Therapy Charges for Today     Code Description Service Date Service Provider Modifiers Qty    88362501182 HC PT THER PROC EA 15 MIN 4/14/2017 Ayaka Rodriguez PTA GP 1          PT G-Codes  Outcome Measure Options: AM-PAC 6 Clicks Basic Mobility (PT)    Ayaka Rodriguez PTA  4/14/2017

## 2017-04-15 NOTE — PLAN OF CARE
Problem: Patient Care Overview (Adult)  Goal: Plan of Care Review  Outcome: Ongoing (interventions implemented as appropriate)    04/15/17 5382   Coping/Psychosocial Response Interventions   Plan Of Care Reviewed With patient   Patient Care Overview   Progress improving   Outcome Evaluation   Outcome Summary/Follow up Plan O2 at 5L; pt stable; slight temp at 99.0 this afternoon; pt slept the majority of the day; Pt does not have IV.       Goal: Adult Individualization and Mutuality  Outcome: Ongoing (interventions implemented as appropriate)  Goal: Discharge Needs Assessment  Outcome: Ongoing (interventions implemented as appropriate)    Problem: Fall Risk (Adult)  Goal: Absence of Falls  Outcome: Ongoing (interventions implemented as appropriate)    Problem: Respiratory Insufficiency (Adult)  Goal: Acid/Base Balance  Outcome: Ongoing (interventions implemented as appropriate)  Goal: Effective Ventilation  Outcome: Ongoing (interventions implemented as appropriate)

## 2017-04-16 NOTE — PROGRESS NOTES
LOS: 10 days   Patient Care Team:  James Epley, APRN as PCP - General  James Epley, APRN as PCP - Family Medicine        Subjective: Patient is complaining she wants to go home.  She denies shortness of breath she is on 8 L nasal cannula O2.  She reports cough nonproductive and sometimes her cough is so bad she wants to throw up.  Denies any nausea.  Normal bowel movements.  No chest pain.  Number next biggest complaint is apparently her purse is missing she says she had it when she came to the emergency room and does not have it now      History taken from: I reviewed the chart and discussed with the patient.  Recently she is a very poor historian she is a schizophrenic who lives in a group home      I was notified by nursing that the patient not been seen in several days apparently Dr. Andres inadvertently left her off his list and had forgotten to see her.    Objective     Vital Signs  Temp:  [98.1 °F (36.7 °C)-99 °F (37.2 °C)] 98.8 °F (37.1 °C)  Heart Rate:  [77-82] 82  Resp:  [18-22] 18  BP: ()/(57-70) 94/68  Body mass index is 38.73 kg/(m^2).  No intake or output data in the 24 hours ending 04/16/17 1015       Physical Exam:  General Appearance: Well-developed white female she looks several decades older than her stated age.  She is resting comfortably on 8 L nasal cannula O2  Eyes: Conjunctiva clear and anicteric pupils are equal and reactive  ENT: Oral mucous membranes are moist no erythema or exudates Mallampati type II airway  Neck: No adenopathy no jugular venous distention or hepatojugular reflux trachea midline  Lungs: Actually pretty clear I don't hear wheezes rales or rhonchi she is nonlabored symmetric expansion again her saturations are 98% or higher on 8 L nasal cannula O2 while was in examining her and talking with her I started titrating her O2 down immediately after her down to 3 L looks like her saturations are holding about 92-94% on this  Cardiac: Regular rate and rhythm I do  not hear murmur rub or gallop  Abdomen: Obese soft no palpable organomegaly or masses  : Not examined  Musc/Skel: Grossly normal  Skin: No jaundice no petechiae no rashes  Neuro: Patient is following commands with all 4 extremities she is pleasant and cooperative she is oriented but I think she has fairly limited cerebral capacity  Extremities/P Vascular: No clubbing cyanosis or edema she has palpable radial and dorsalis pedis pulses bilaterally  MSE: She seems to be in reasonably good spirits        Labs:  WBC No results found for: WBCS   HGB Hemoglobin   Date Value Ref Range Status   04/13/2017 10.9 (L) 11.9 - 15.5 g/dL Final      HCT Hematocrit   Date Value Ref Range Status   04/13/2017 34.4 (L) 35.6 - 45.5 % Final      Platlets No results found for: LABPLAT     PT/INR:  No results found for: PROTIME/No results found for: INR    Sodium No results found for: NA   Potassium No results found for: K   Chloride No results found for: CL   Bicarbonate No results found for: PLASMABICARB   BUN No results found for: BUN   Creatinine No results found for: CREATININE   Calcium No results found for: CALCIUM   Magnesium No results found for: MG         pH No results found for: PHART   pO2 No results found for: PO2ART   pCO2 No results found for: KXT0HGW   HCO3 No results found for: RRX2LTG       enoxaparin 40 mg Subcutaneous Daily   ipratropium-albuterol 3 mL Nebulization Q4H - RT   nystatin 5 mL Oral 4x Daily          Diagnostics:  Imaging Results (last 24 hours)     ** No results found for the last 24 hours. **          Did review her chest radiographs from admission        Assessment/Plan     Patient Active Problem List   Diagnosis Code   • Atopic rhinitis J30.9   • Anemia D64.9   • Aspiration pneumonia J69.0   • Chronic obstructive pulmonary disease J44.9   • Dysphagia R13.10   • Gastroesophageal reflux disease with esophagitis K21.0   • Heart murmur R01.1   • Hyperglycemia R73.9   • Hyperlipidemia E78.5   •  Hypothyroidism E03.9   • Pediculosis capitis B85.0   • Nicotine dependence F17.200   • Osteoporosis M81.0   • Shortness of breath R06.02   • Swallowing impaired R13.10   • Sepsis A41.9       ImPression #1 influenza  B infection and probable pneumonia I will follow up a chest x-ray  #2 acute hypoxemic and hypercapnic respiratory failure seems like it is improved we need to try and wean her O2 aggressively to see exactly what her O2 requirements are  #3 Escherichia coli urinary tract infection she was treated repeat urine was clear  #4 schizophrenia  #5 metabolic encephalopathy improved  #6 dysphagia patient has refused PEG tube placement she is on a dysphagia diet  #7 suspected obstructive sleep apnea the question is can she comply safely with a Pap machine I'm not certain at this time  #8 fluids/electrolytes/nutrition we'll follow up potassium and sodium levels  #9 mild will follow up H&H    #10 tobacco abuse she did admit that apparently this he is still smoking up until time of admission.  Looking cessation has been discussed with the patient.  #11 of both mixed obstructive and restrictive pulmonary function studies from what I can glean from the records she has COPD and the restriction was felt to be in part due to obesity. But also see history  noted patient cannot help with history  #12 nocturnal hypoxemia chronic he has been on our been prescribed 2 L O2 at home for use with sleep apparently she is not real compliant with this therapy    Plan for disposition: See what labs shown how much we can wean her O2 A x-ray reveals to decide on disposition    Jayden Mcmillan MD  04/16/17  10:15 AM    Time: Spent over 45 minutes reviewing the chart and discussing with the nurse and the patient and not formulating a treatment plan today

## 2017-04-16 NOTE — PLAN OF CARE
Problem: Patient Care Overview (Adult)  Goal: Plan of Care Review  Outcome: Ongoing (interventions implemented as appropriate)    04/15/17 1825 04/16/17 1403 04/16/17 1552   Coping/Psychosocial Response Interventions   Plan Of Care Reviewed With --  patient --    Patient Care Overview   Progress improving --  --    Outcome Evaluation   Outcome Summary/Follow up Plan --  --  02 at 4.5 liters. pt will probably be d/c'd soon. oxygen needs to be weaned.          Problem: Fall Risk (Adult)  Goal: Absence of Falls  Outcome: Ongoing (interventions implemented as appropriate)    Problem: Respiratory Insufficiency (Adult)  Goal: Acid/Base Balance  Outcome: Ongoing (interventions implemented as appropriate)  Goal: Effective Ventilation  Outcome: Ongoing (interventions implemented as appropriate)

## 2017-04-17 NOTE — NURSING NOTE
Tiffanie called this afternoon to state that patient Clozapine is 100 mg take 3 tablets nightly and Serequel 50 mg take 1 tablet Q8 PRN.  Dr. Mcmillna (on call) notified for clarification.  Dr. Mcmillan states Clozapine decreased 100 mg only due to time lapse in routine medication and Seroquel 50 mg order is not needed at this time.

## 2017-04-17 NOTE — PLAN OF CARE
Problem: Patient Care Overview (Adult)  Goal: Plan of Care Review  Outcome: Ongoing (interventions implemented as appropriate)  Goal: Adult Individualization and Mutuality  Outcome: Ongoing (interventions implemented as appropriate)  Goal: Discharge Needs Assessment  Outcome: Ongoing (interventions implemented as appropriate)    Problem: Fall Risk (Adult)  Goal: Absence of Falls  Outcome: Ongoing (interventions implemented as appropriate)    Problem: Respiratory Insufficiency (Adult)  Goal: Acid/Base Balance  Outcome: Ongoing (interventions implemented as appropriate)  Goal: Effective Ventilation  Outcome: Ongoing (interventions implemented as appropriate)

## 2017-04-17 NOTE — NURSING NOTE
Heather Yuan at [facility] will need to be notified when discharged for transportation [phone] Ext #[phone], Cell [phone]

## 2017-04-17 NOTE — PROGRESS NOTES
Acute Care - Physical Therapy Treatment Note  [facility]     Patient Name: Bharti Roche  : 1955  MRN: [id number]  Today's Date: 2017  Onset of Illness/Injury or Date of Surgery Date: 17          Admit Date: 2017    Visit Dx:    ICD-10-CM ICD-9-CM   1. Sepsis, due to unspecified organism A41.9 038.9     995.91   2. Hypoxia R09.02 799.02   3. Urinary tract infection without hematuria, site unspecified N39.0 599.0   4. Fever, unspecified fever cause R50.9 780.60   5. COPD exacerbation J44.1 491.21     Patient Active Problem List   Diagnosis   • Atopic rhinitis   • Anemia   • Aspiration pneumonia   • Chronic obstructive pulmonary disease   • Dysphagia   • Gastroesophageal reflux disease with esophagitis   • Heart murmur   • Hyperglycemia   • Hyperlipidemia   • Hypothyroidism   • Pediculosis capitis   • Nicotine dependence   • Osteoporosis   • Shortness of breath   • Swallowing impaired   • Sepsis               Adult Rehabilitation Note       17 1000 17 1522       Rehab Assessment/Intervention    Discipline physical therapy assistant  -CW physical therapy assistant  -     Document Type therapy note (daily note)  -CW      Subjective Information agree to therapy;complains of;fatigue  -CW      Patient Effort, Rehab Treatment adequate  -CW      Precautions/Limitations fall precautions;oxygen therapy device and L/min  -CW fall precautions  -JW     Recorded by [CW] Jarrod Taylor [JW] Ayaka Rodriguez PTA     Vital Signs    Pre SpO2 (%)  90  -JW     O2 Delivery Pre Treatment supplemental O2  -CW supplemental O2  -JW     Post SpO2 (%)  94  -JW     Recorded by [CW] Jarrod Taylor [JW] Ayaka Rodriguez PTA     Pain Assessment    Pain Assessment No/denies pain  -CW No/denies pain  -JW     Recorded by [CW] Jarrod Taylor [JW] Ayaka Rodriguez PTA     Cognitive Assessment/Intervention    Current Cognitive/Communication Assessment impaired  -CW      Orientation Status  oriented x 4  -CW      Follows Commands/Answers Questions 100% of the time;able to follow single-step instructions;needs cueing  - 100% of the time  -     Personal Safety mild impairment;decreased awareness, need for safety;decreased insight to deficits  - mild impairment  -     Personal Safety Interventions fall prevention program maintained;gait belt;nonskid shoes/slippers when out of bed  - fall prevention program maintained;gait belt  -JW     Recorded by [CW] Jarrod Taylor [] Ayaka Rodriguez PTA     Bed Mobility, Assessment/Treatment    Bed Mob, Supine to Sit, [place] supervision required  -CW --  -JW     Bed Mob, Sit to Supine, [place] supervision required  -CW      Recorded by [CW] Jarrod Taylor [JW] Ayaka Rodriguez PTA     Transfer Assessment/Treatment    Transfers, Sit-Stand [place] stand by assist  - stand by assist;conditional independence  -     Transfers, Stand-Sit [place] stand by assist  -CW stand by assist;conditional independence  -     Transfers, Sit-Stand-Sit, Assist Device rolling walker  -CW --  -JW     Recorded by [CW] Jarrod Taylor [JW] Ayaka Rodriguez PTA     Gait Assessment/Treatment    Gait, [place] Level contact guard assist  -CW      Gait, Assistive Device rolling walker  -CW --  -JW     Gait, Distance (Feet) 200  -  -JW     Gait, Gait Deviations tracey decreased;step length decreased;stride length decreased  - step length decreased   uneven tracey  -JW     Gait, Safety Issues supplemental O2  -CW supplemental O2  -JW     Gait, Comment  no LOB w/o AD  -JW     Recorded by [CW] Jarrod Taylor [JW] Ayaka Rodriguez PTA     Positioning and Restraints    Pre-Treatment Position in bed  -CW sitting in chair/recliner  -JW     Post Treatment Position bed  -CW bed  -JW     In Bed notified nsg;supine;call light within reach;encouraged to call for assist;exit alarm on  -CW supine;call light within reach;encouraged to  call for assist;exit alarm on;notified nsg  -JW     Recorded by [CW] Jarrod Taylor [JW] Ayaka Rodriguez PTA       User Key  (r) = Recorded By, (t) = Taken By, (c) = Cosigned By    Initials Name Effective Dates    JAGDISH Rodriguez PTA 02/18/16 -     CW Jarrod Taylor 12/13/16 -                 IP PT Goals       04/12/17 1427          Gait Training PT LTG    Gait Training Goal PT LTG, Date Established 04/12/17  -AR      Gait Training Goal PT LTG, Time to Achieve 1 wk  -AR      Gait Training Goal PT LTG, [place] Level conditional independence  -AR      Gait Training Goal PT LTG, Distance to Achieve 150  -AR      Stair Training PT LTG    Stair Training Goal PT LTG, Date Established 04/12/17  -AR      Stair Training Goal PT LTG, Time to Achieve 1 wk  -AR      Stair Training Goal PT LTG, Number of Steps 10  -AR      Stair Training Goal PT LTG, [place] Level contact guard assist  -AR      Stair Training Goal PT LTG, Assist Device 1 handrail  -AR        User Key  (r) = Recorded By, (t) = Taken By, (c) = Cosigned By    Initials Name Provider Type    AR Amina Clif, PT Physical Therapist          Physical Therapy Education     Title: PT OT SLP Therapies (Done)     Topic: Physical Therapy (Done)     Point: Mobility training (Done)    Learning Progress Summary    Learner Readiness Method Response Comment Documented by Status   Patient Acceptance E,TB DU,VU  CW 04/17/17 1005 Done    Acceptance E VU  JW 04/14/17 1525 Done    Acceptance E VU  JW1 04/14/17 0037 Done    Acceptance E,TB DU,VU  CW 04/13/17 1419 Done    Acceptance E VU  JW1 04/12/17 2244 Done    Acceptance E NR  AR 04/12/17 1429 Active               Point: Home exercise program (Done)    Learning Progress Summary    Learner Readiness Method Response Comment Documented by Status   Patient Acceptance E,TB DU,VU  CW 04/17/17 1005 Done    Acceptance E VU  JW1 04/14/17 0037 Done    Acceptance E,TB DU,VU  CW 04/13/17 1419 Done    Acceptance E  VU  JW1 04/12/17 2244 Done    Acceptance E NR  AR 04/12/17 1429 Active               Point: Body mechanics (Done)    Learning Progress Summary    Learner Readiness Method Response Comment Documented by Status   Patient Acceptance E,TB DU,VU  CW 04/17/17 1005 Done    Acceptance E VU  JW1 04/14/17 0037 Done    Acceptance E,TB DU,VU  CW 04/13/17 1419 Done    Acceptance E VU  JW1 04/12/17 2244 Done    Acceptance E NR  AR 04/12/17 1429 Active               Point: Precautions (Done)    Learning Progress Summary    Learner Readiness Method Response Comment Documented by Status   Patient Acceptance E,TB DU,VU  CW 04/17/17 1005 Done    Acceptance E VU  JW1 04/14/17 0037 Done    Acceptance E,TB DU,VU  CW 04/13/17 1419 Done    Acceptance E VU  JW1 04/12/17 2244 Done    Acceptance E NR  AR 04/12/17 1429 Active                      User Key     Initials Effective Dates Name Provider Type Discipline     02/18/16 -  Ayaka Rodriguez, PTA Physical Therapy Assistant PT    AR 06/27/16 -  Amina Menezes, PT Physical Therapist PT    [facility] 07/06/16 -  Alejandro Barth, RN Registered Nurse Nurse     12/13/16 -  Jarrod Taylor Physical Therapy Assistant PT                    PT Recommendation and Plan  Anticipated Discharge Disposition: skilled nursing facility, home with assist, home with home health (may benefit from DC to [facility] pending progress)  Planned Therapy Interventions: balance training, bed mobility training, gait training, home exercise program, patient/family education, stair training, ROM (Range of Motion), strengthening  PT Frequency: 5 times/wk  Plan of Care Review  Plan Of Care Reviewed With: patient  Progress: progress toward functional goals as expected  Outcome Summary/Follow up Plan: Pt is increasing with strength and balance with use of RWX          Outcome Measures       04/17/17 1000 04/14/17 1500       How much help from another person do you currently need...    Turning from your back to your side while in flat  bed without using bedrails? 4  -CW 4  -JW     Moving from lying on back to sitting on the side of a flat bed without bedrails? 4  -CW 4  -JW     Moving to and from a bed to a chair (including a wheelchair)? 4  -CW 3  -JW     Standing up from a chair using your arms (e.g., wheelchair, bedside chair)? 4  -CW 4  -JW     Climbing 3-5 steps with a railing? 3  -CW 3  -JW     To walk in hospital room? 3  -CW 3  -JW     AM-PAC 6 Clicks Score 22  -CW 21  -JW     Functional Assessment    Outcome Measure Options AM-PAC 6 Clicks Basic Mobility (PT)  -CW        User Key  (r) = Recorded By, (t) = Taken By, (c) = Cosigned By    Initials Name Provider Type    JAGDISH Rodriguez, PTA Physical Therapy Assistant    CATHLEEN Taylor Physical Therapy Assistant           Time Calculation:         PT Charges       04/17/17 1007          Time Calculation    Start Time 0944  -CW      Stop Time 1000  -CW      Time Calculation (min) 16 min  -CW      PT Received On 04/17/17  -CW      PT - Next Appointment 04/18/17  -CW        User Key  (r) = Recorded By, (t) = Taken By, (c) = Cosigned By    Initials Name Provider Type    CW Jarrod Taylor Physical Therapy Assistant          Therapy Charges for Today     Code Description Service Date Service Provider Modifiers Qty    78134330305 HC PT THER PROC EA 15 MIN 4/17/2017 Jarrod Taylor GP 1    63570298390 HC PT THER SUPP EA 15 MIN 4/17/2017 Jarrod Taylor GP 1          PT G-Codes  Outcome Measure Options: AM-PAC 6 Clicks Basic Mobility (PT)    Jarrod Taylor  4/17/2017

## 2017-04-17 NOTE — PAYOR COMM NOTE
"Liset Roche (61 y.o. Female)                                                 REF#  [id number]                                              CONTACT=  AVILA BELCHER                                            FAX  [phone]                                             [phone]        Date of Birth Social Security Number Address Home Phone MRN    1955  [id number] [facility] [address] [facility] [address] [place], [place] [address] [phone] [id number]    [employer] Mar[employer] Status          Rastafari Single       Admission Date Admission Type Admitting Provider Attending Provider Department, Room/Bed    4/6/17 Emergency Malini Thornton MD Jambeih, Rami, MD [address], 613/1    Discharge Date Discharge Disposition Discharge Destination                      Attending Provider: Malini Thornton MD     Allergies:  No Known Allergies    Isolation:  Droplet   Infection:  MRSA/History Only (04/07/17), Influenza (04/06/17)   Code Status:  Conditional    Ht:  60.3\" (153.2 cm)   Wt:  201 lb 11.2 oz (91.5 kg)    Admission Cmt:  None   Principal Problem:  None                Active Insurance as of 4/6/2017     Primary Coverage     Payor Plan Insurance Group Employer/Plan Group    PASSPORT PASSPORT      Payor Plan Address Payor Plan Phone Number Effective From Effective To    [address] [phone] 1/1/1998     [place] [address]       Subscriber Name Subscriber Birth Date Member ID       TAWANA,LISET CHAI 1955 [id number]                 Emergency Contacts      (Rel.) Home Phone Work Phone Mobile Phone    [place], [place] -- -- [phone]    Barrie Lyman (Sister) -- -- [phone]    Heather Leyva () -- -- [phone]            Insurance Information                PASSPORT/PASSPORT Phone: [phone]    Subscriber: Liset Roche Subscriber#: [id number]    Group#:  Precert#:           Vital Signs (last 24 hours)       04/16 0700  -  04/17 0659 04/17 0700  -  04/17 1318   Most Recent    " Temp (°F) 97.8 -  98.8      98.4     98.4 (36.9)    Heart Rate 78 -  86    66 -  76     66    Resp 16 -  22    16 -  18     18    BP 94/68 -  108/73      102/61     102/61    SpO2 (%) 92 -  97    90 -  93     90          Hospital Medications (active)       Dose Frequency Start End    acetaminophen (TYLENOL) tablet 650 mg 650 mg Every 6 Hours PRN 4/8/2017     Sig - Route: Take 2 tablets by mouth Every 6 (Six) Hours As Needed for Mild Pain (1-3). - Oral    benztropine (COGENTIN) tablet 1 mg 1 mg 2 Times Daily 4/16/2017     Sig - Route: Take 1 tablet by mouth 2 (Two) Times a Day. - Oral    cloZAPine (CLOZARIL) tablet 100 mg 100 mg Nightly 4/16/2017     Sig - Route: Take 1 tablet by mouth Every Night. - Oral    divalproex (DEPAKOTE) DR tablet 1,000 mg 1,000 mg Nightly 4/16/2017     Sig - Route: Take 2 tablets by mouth Every Night. - Oral    Notes to Pharmacy: 2 tablets    divalproex (DEPAKOTE) DR tablet 500 mg 500 mg Daily 4/16/2017     Sig - Route: Take 1 tablet by mouth Daily. - Oral    enoxaparin (LOVENOX) syringe 40 mg 40 mg Daily 4/6/2017     Sig - Route: Inject 0.4 mL under the skin Daily. - Subcutaneous    ipratropium-albuterol (DUO-NEB) nebulizer solution 3 mL 3 mL Every 4 Hours - RT 4/6/2017     Sig - Route: Take 3 mL by nebulization Every 4 (Four) Hours. - Nebulization    nystatin (MYCOSTATIN) 241557 UNIT/ML suspension 500,000 Units 5 mL 4 Times Daily 4/10/2017     Sig - Route: Take 5 mL by mouth 4 (Four) Times a Day. - Oral    ondansetron (ZOFRAN) injection 4 mg 4 mg Every 6 Hours PRN 4/16/2017     Sig - Route: Infuse 2 mL into a venous catheter Every 6 (Six) Hours As Needed for Nausea or Vomiting. - Intravenous    potassium chloride (KLOR-CON) packet 40 mEq 40 mEq As Needed 4/10/2017     Sig - Route: Take 40 mEq by mouth As Needed (potassium replacement, see admin instructions). - Oral    potassium chloride (MICRO-K) CR capsule 40 mEq 40 mEq As Needed 4/10/2017     Sig - Route: Take 4 capsules by mouth As  "Needed (potassium replacement.  see admin instructions). - Oral    Linked Group 1:  \"Or\" Linked Group Details        potassium chloride 10 mEq in 100 mL IVPB 10 mEq Every 1 Hour PRN 4/10/2017     Sig - Route: Infuse 100 mL into a venous catheter Every 1 (One) Hour As Needed (potassium protocol PERIPHERAL - see admin instructions). - Intravenous    Linked Group 1:  \"Or\" Linked Group Details        sertraline (ZOLOFT) tablet 100 mg 100 mg Daily 4/16/2017     Sig - Route: Take 1 tablet by mouth Daily. - Oral    sodium chloride 0.9 % flush 1-10 mL 1-10 mL As Needed 4/6/2017     Sig - Route: Infuse 1-10 mL into a venous catheter As Needed for Line Care. - Intravenous    sodium chloride 0.9 % flush 10 mL 10 mL As Needed 4/6/2017     Sig - Route: Infuse 10 mL into a venous catheter As Needed for Line Care. - Intravenous    Cosign for Ordering: Accepted by Darby Palacios MD on 4/6/2017  7:54 PM    divalproex (DEPAKOTE) DR tablet 500 mg (Discontinued) 500 mg Nightly 4/16/2017 4/16/2017    Sig - Route: Take 1 tablet by mouth Every Night. - Oral    Notes to Pharmacy: 2 tablets            Lab Results (last 24 hours)     Procedure Component Value Units Date/Time    POC Glucose Fingerstick [[id number]]  (Normal) Collected:  04/16/17 1717    Specimen:  Blood Updated:  04/16/17 1718     Glucose 116 mg/dL     Narrative:       Meter: [id number] : [id number] Marcos Posey    POC Glucose Fingerstick [[id number]]  (Normal) Collected:  04/16/17 2059    Specimen:  Blood Updated:  04/16/17 2101     Glucose 116 mg/dL     Narrative:       Meter: [id number] : [id number] Nato Bonilla    POC Glucose Fingerstick [[id number]]  (Normal) Collected:  04/17/17 0750    Specimen:  Blood Updated:  04/17/17 0750     Glucose 92 mg/dL     Narrative:       Meter: [id number] : [id number] Maira Orona    Potassium [[id number]]  (Normal) Collected:  04/17/17 1049    Specimen:  Blood Updated:  04/17/17 1204     Potassium 4.5 mmol/L     POC Glucose " Fingerstick [[id number]]  (Normal) Collected:  04/17/17 1224    Specimen:  Blood Updated:  04/17/17 1226     Glucose 95 mg/dL     Narrative:       Meter: [id number] : [id number] Meir Gant    Blood Culture [[id number]]  (Normal) Collected:  04/13/17 1150    Specimen:  Blood from Hand, Left Updated:  04/17/17 1301     Blood Culture No growth at 4 days    Blood Culture [[id number]]  (Normal) Collected:  04/13/17 1156    Specimen:  Blood from Hand, Right Updated:  04/17/17 1301     Blood Culture No growth at 4 days         Progress Notes Date of Service: 4/16/2017 10:15 AM      Expand All Collapse All    []Hide copied text  []Hover for attribution information                 LOS: 10 days   Patient Care Team:  James Epley, APRN as PCP - General  James Epley, APRN as PCP - Family Medicine           Subjective: Patient is complaining she wants to go home. She denies shortness of breath she is on 8 L nasal cannula O2. She reports cough nonproductive and sometimes her cough is so bad she wants to throw up. Denies any nausea. Normal bowel movements. No chest pain.  Number next biggest complaint is apparently her purse is missing she says she had it when she came to the emergency room and does not have it now        History taken from: I reviewed the chart and discussed with the patient. Recently she is a very poor historian she is a schizophrenic who lives in a group home        I was notified by nursing that the patient not been seen in several days apparently Dr. Andres inadvertently left her off his list and had forgotten to see her.        Objective      Vital Signs  Temp: [98.1 °F (36.7 °C)-99 °F (37.2 °C)] 98.8 °F (37.1 °C)  Heart Rate: [77-82] 82  Resp: [18-22] 18  BP: ()/(57-70) 94/68  Body mass index is 38.73 kg/(m^2).  No intake or output data in the 24 hours ending 04/16/17 1015        Physical Exam:  General Appearance: Well-developed white female she looks several decades older than her stated age. She  is resting comfortably on 8 L nasal cannula O2  Eyes: Conjunctiva clear and anicteric pupils are equal and reactive  ENT: Oral mucous membranes are moist no erythema or exudates Mallampati type II airway  Neck: No adenopathy no jugular venous distention or hepatojugular reflux trachea midline  Lungs: Actually pretty clear I don't hear wheezes rales or rhonchi she is nonlabored symmetric expansion again her saturations are 98% or higher on 8 L nasal cannula O2 while was in examining her and talking with her I started titrating her O2 down immediately after her down to 3 L looks like her saturations are holding about 92-94% on this  Cardiac: Regular rate and rhythm I do not hear murmur rub or gallop  Abdomen: Obese soft no palpable organomegaly or masses  : Not examined  Musc/Skel: Grossly normal  Skin: No jaundice no petechiae no rashes  Neuro: Patient is following commands with all 4 extremities she is pleasant and cooperative she is oriented but I think she has fairly limited cerebral capacity  Extremities/P Vascular: No clubbing cyanosis or edema she has palpable radial and dorsalis pedis pulses bilaterally  MSE: She seems to be in reasonably good spirits          Labs:  WBC No results found for: WBCS   HGB       Hemoglobin   Date Value Ref Range Status   04/13/2017 10.9 (L) 11.9 - 15.5 g/dL Final       HCT       Hematocrit   Date Value Ref Range Status   04/13/2017 34.4 (L) 35.6 - 45.5 % Final       Platlets No results found for: LABPLAT      PT/INR: No results found for: PROTIME/No results found for: INR     Sodium No results found for: NA   Potassium No results found for: K   Chloride No results found for: CL   Bicarbonate No results found for: PLASMABICARB   BUN No results found for: BUN   Creatinine No results found for: CREATININE   Calcium No results found for: CALCIUM   Magnesium No results found for: MG            pH No results found for: PHART   pO2 No results found for: PO2ART   pCO2 No results found  for: ATH7UOF   HCO3 No results found for: TPG4QQD         enoxaparin 40 mg Subcutaneous Daily   ipratropium-albuterol 3 mL Nebulization Q4H - RT   nystatin 5 mL Oral 4x Daily            Diagnostics:      Imaging Results (last 24 hours)      ** No results found for the last 24 hours. **             Did review her chest radiographs from admission              Assessment/Plan               Patient Active Problem List   Diagnosis Code   • Atopic rhinitis J30.9   • Anemia D64.9   • Aspiration pneumonia J69.0   • Chronic obstructive pulmonary disease J44.9   • Dysphagia R13.10   • Gastroesophageal reflux disease with esophagitis K21.0   • Heart murmur R01.1   • Hyperglycemia R73.9   • Hyperlipidemia E78.5   • Hypothyroidism E03.9   • Pediculosis capitis B85.0   • Nicotine dependence F17.200   • Osteoporosis M81.0   • Shortness of breath R06.02   • Swallowing impaired R13.10   • Sepsis A41.9         ImPression #1 influenza B infection and probable pneumonia I will follow up a chest x-ray  #2 acute hypoxemic and hypercapnic respiratory failure seems like it is improved we need to try and wean her O2 aggressively to see exactly what her O2 requirements are  #3 Escherichia coli urinary tract infection she was treated repeat urine was clear  #4 schizophrenia  #5 metabolic encephalopathy improved  #6 dysphagia patient has refused PEG tube placement she is on a dysphagia diet  #7 suspected obstructive sleep apnea the question is can she comply safely with a Pap machine I'm not certain at this time  #8 fluids/electrolytes/nutrition we'll follow up potassium and sodium levels  #9 mild will follow up H&H   #10 tobacco abuse she did admit that apparently this he is still smoking up until time of admission. Looking cessation has been discussed with the patient.  #11 of both mixed obstructive and restrictive pulmonary function studies from what I can glean from the records she has COPD and the restriction was felt to be in part due to  obesity. But also see history  noted patient cannot help with history  #12 nocturnal hypoxemia chronic he has been on our been prescribed 2 L O2 at home for use with sleep apparently she is not real compliant with this therapy     Plan for disposition: See what labs shown how much we can wean her O2 A x-ray reveals to decide on disposition     Jayden Mcmillan MD  04/16/17  10:15 AM     Time: Spent over 45 minutes reviewing the chart and discussing with the nurse and the patient and not formulating a treatment plan today

## 2017-04-17 NOTE — PLAN OF CARE
Problem: Patient Care Overview (Adult)  Goal: Plan of Care Review  Outcome: Ongoing (interventions implemented as appropriate)    04/17/17 1005   Coping/Psychosocial Response Interventions   Plan Of Care Reviewed With patient   Patient Care Overview   Progress progress toward functional goals as expected   Outcome Evaluation   Outcome Summary/Follow up Plan Pt is increasing with strength and balance with use of RWX

## 2017-04-17 NOTE — PLAN OF CARE
Problem: Patient Care Overview (Adult)  Goal: Adult Individualization and Mutuality  Outcome: Ongoing (interventions implemented as appropriate)  Goal: Discharge Needs Assessment  Outcome: Ongoing (interventions implemented as appropriate)    Problem: Respiratory Insufficiency (Adult)  Goal: Effective Ventilation  Outcome: Ongoing (interventions implemented as appropriate)

## 2017-04-18 NOTE — PROGRESS NOTES
LOS: 11 days   Patient Care Team:  James Epley, APRN as PCP - General  James Epley, APRN as PCP - Family Medicine        Subjective: Patient.  Somnolent took a lot to get her awake she had no complaints no shortness of breath      Objective     Vital Signs  Temp:  [97.6 °F (36.4 °C)-98.4 °F (36.9 °C)] 97.6 °F (36.4 °C)  Heart Rate:  [66-86] 66  Resp:  [16-20] 20  BP: (102-117)/(61-82) 113/71  Body mass index is 39 kg/(m^2).    Intake/Output Summary (Last 24 hours) at 04/17/17 2242  Last data filed at 04/17/17 0644   Gross per 24 hour   Intake              460 ml   Output                0 ml   Net              460 ml          Physical Exam:  General Appearance: Well-developed white female she looks several decades older than her stated age.  She is resting comfortably on 3.5 L nasal cannula O2  Eyes: Conjunctiva clear and anicteric pupils are equal and reactive  ENT: Oral mucous membranes are moist no erythema or exudates Mallampati type II airway  Neck: No adenopathy no jugular venous distention or hepatojugular reflux trachea midline  Lungs: Actually pretty clear I don't hear wheezes rales or rhonchi she is nonlabored symmetric expansion again her saturations are 94%  on 3.5 L nasal cannula O2 while was in examining her   Cardiac: Regular rate and rhythm I do not hear murmur rub or gallop  Abdomen: Obese soft no palpable organomegaly or masses  : Not examined  Musc/Skel: Grossly normal  Skin: No jaundice no petechiae no rashes  Neuro: Patient is following commands with all 4 extremities she is pleasant and cooperative she is oriented but I think she has fairly limited cerebral capacity  Extremities/P Vascular: No clubbing cyanosis or edema she has palpable radial and dorsalis pedis pulses bilaterally  MSE: She seems to be in reasonably good spirits        Labs:  WBC No results found for: WBCS   HGB Hemoglobin   Date Value Ref Range Status   04/16/2017 10.7 (L) 11.9 - 15.5 g/dL Final      HCT Hematocrit    Date Value Ref Range Status   04/16/2017 32.8 (L) 35.6 - 45.5 % Final      Platlets No results found for: LABPLAT     PT/INR:  No results found for: PROTIME/No results found for: INR    Sodium Sodium   Date Value Ref Range Status   04/16/2017 139 136 - 145 mmol/L Final      Potassium Potassium   Date Value Ref Range Status   04/17/2017 4.5 3.5 - 5.2 mmol/L Final   04/16/2017 3.1 (L) 3.5 - 5.2 mmol/L Final      Chloride Chloride   Date Value Ref Range Status   04/16/2017 96 (L) 98 - 107 mmol/L Final      Bicarbonate No results found for: PLASMABICARB   BUN BUN   Date Value Ref Range Status   04/16/2017 12 6 - 20 mg/dL Final      Creatinine Creatinine   Date Value Ref Range Status   04/16/2017 0.72 0.57 - 1.00 mg/dL Final      Calcium Calcium   Date Value Ref Range Status   04/16/2017 9.3 8.6 - 10.5 mg/dL Final      Magnesium No results found for: MG         pH No results found for: PHART   pO2 No results found for: PO2ART   pCO2 No results found for: [id number]   HCO3 No results found for: [id number]       benztropine 1 mg Oral BID   cloZAPine 100 mg Oral Nightly   divalproex 1,000 mg Oral Nightly   divalproex 500 mg Oral Daily   enoxaparin 40 mg Subcutaneous Daily   ipratropium-albuterol 3 mL Nebulization Q4H - RT   nystatin 5 mL Oral 4x Daily   sertraline 100 mg Oral Daily          Diagnostics:  Imaging Results (last 24 hours)     ** No results found for the last 24 hours. **          Did review her chest radiographs from admission        Assessment/Plan     Patient Active Problem List   Diagnosis Code   • Atopic rhinitis J30.9   • Anemia D64.9   • Aspiration pneumonia J69.0   • Chronic obstructive pulmonary disease J44.9   • Dysphagia R13.10   • Gastroesophageal reflux disease with esophagitis K21.0   • Heart murmur R01.1   • Hyperglycemia R73.9   • Hyperlipidemia E78.5   • Hypothyroidism E03.9   • Pediculosis capitis B85.0   • Nicotine dependence F17.200   • Osteoporosis M81.0   • Shortness of breath R06.02   •  Swallowing impaired R13.10   • Sepsis A41.9       ImPression #1 influenza  B infection and probable pneumonia I will follow up a chest x-ray not done today will reorder  #2 acute hypoxemic and hypercapnic respiratory failure seems like it is improved we need to try and wean her O2 aggressively to see exactly what her O2 requirements are she desats with sleep much better when she is awake  #3 Escherichia coli urinary tract infection she was treated repeat urine was clear  #4 schizophrenia I understand the nurses were called by her home concerned that she wasn't getting full doses of her medications.  We have found that the patient is very somnolent with the full dose of her medications and even with reduced doses tonight she is very hard to arouse.  I don't want to discontinue the Clozaril and we've held the Seroquel she has been very calm.  We will continue to monitor  #5 metabolic encephalopathy improved  #6 dysphagia patient has refused PEG tube placement she is on a dysphagia diet  #7 suspected obstructive sleep apnea the question is can she comply safely with a Pap machine I'm not certain at this time  #8 fluids/electrolytes/nutrition we'll follow up potassium and sodium levels  #9 mild will follow up H&H    #10 tobacco abuse she did admit that apparently this he is still smoking up until time of admission.  smoking cessation has been discussed with the patient.  #11  both mixed obstructive and restrictive pulmonary function studies from what I can glean from the records she has COPD and the restriction was felt to be in part due to obesity. But also see history  noted patient cannot help with history  #12 nocturnal hypoxemia chronic he has been on our been prescribed 2 L O2 at home for use with sleep apparently she is not real compliant with this therapy    Plan for disposition: Depending on x-ray shows and how she does possibly discharge as soon as tomorrow    Jayden Mcmillan MD  04/17/17  10:42  PM    Time:

## 2017-04-18 NOTE — PROGRESS NOTES
Acute Care - Physical Therapy Treatment Note  [facility]     Patient Name: Bharti Roche  : 1955  MRN: [id number]  Today's Date: 2017  Onset of Illness/Injury or Date of Surgery Date: 17          Admit Date: 2017    Visit Dx:    ICD-10-CM ICD-9-CM   1. Sepsis, due to unspecified organism A41.9 038.9     995.91   2. Hypoxia R09.02 799.02   3. Urinary tract infection without hematuria, site unspecified N39.0 599.0   4. Fever, unspecified fever cause R50.9 780.60   5. COPD exacerbation J44.1 491.21     Patient Active Problem List   Diagnosis   • Atopic rhinitis   • Anemia   • Aspiration pneumonia   • Chronic obstructive pulmonary disease   • Dysphagia   • Gastroesophageal reflux disease with esophagitis   • Heart murmur   • Hyperglycemia   • Hyperlipidemia   • Hypothyroidism   • Pediculosis capitis   • Nicotine dependence   • Osteoporosis   • Shortness of breath   • Swallowing impaired   • Sepsis               Adult Rehabilitation Note       17 0900 17 1000       Rehab Assessment/Intervention    Discipline physical therapy assistant  -CW physical therapy assistant  -CW     Document Type therapy note (daily note)  -CW therapy note (daily note)  -CW     Subjective Information no complaints;agree to therapy  -CW agree to therapy;complains of;fatigue  -CW     Patient Effort, Rehab Treatment adequate  -CW adequate  -CW     Precautions/Limitations fall precautions;oxygen therapy device and L/min  -CW fall precautions;oxygen therapy device and L/min  -CW     Recorded by [CW] Jarrod Taylor [CW] Jarrod Taylor     Vital Signs    O2 Delivery Pre Treatment supplemental O2  -CW supplemental O2  -CW     Recorded by [CW] Jarrod Taylor [CW] Jarrod Taylor     Pain Assessment    Pain Assessment No/denies pain  -CW No/denies pain  -CW     Recorded by [CW] Jarrod Taylor [CW] Jarrod Taylor     Cognitive Assessment/Intervention    Current Cognitive/Communication  Assessment impaired  -CW impaired  -CW     Orientation Status oriented x 4  -CW oriented x 4  -CW     Follows Commands/Answers Questions 100% of the time;able to follow single-step instructions;needs cueing  -% of the time;able to follow single-step instructions;needs cueing  -CW     Personal Safety mild impairment;decreased awareness, need for safety;decreased insight to deficits  -CW mild impairment;decreased awareness, need for safety;decreased insight to deficits  -CW     Personal Safety Interventions fall prevention program maintained;gait belt;nonskid shoes/slippers when out of bed  -CW fall prevention program maintained;gait belt;nonskid shoes/slippers when out of bed  -CW     Recorded by [CW] Jarrod Taylor [CW] Jarrod Taylor     Bed Mobility, Assessment/Treatment    Bed Mob, Supine to Sit, [place] supervision required  -CW supervision required  -CW     Bed Mob, Sit to Supine, [place] supervision required  -CW supervision required  -CW     Recorded by [CW] Jarrod Taylor [CW] Jarrod Taylor     Transfer Assessment/Treatment    Transfers, Sit-Stand [place] stand by assist  -CW stand by assist  -CW     Transfers, Stand-Sit [place] stand by assist  -CW stand by assist  -CW     Transfers, Sit-Stand-Sit, Assist Device rolling walker  -CW rolling walker  -CW     Recorded by [CW] Jarrod Taylor [CW] Jarrod Taylor     Gait Assessment/Treatment    Gait, [place] Level contact guard assist  -CW contact guard assist  -CW     Gait, Assistive Device rolling walker  -CW rolling walker  -CW     Gait, Distance (Feet) 250  -  -CW     Gait, Gait Deviations tracey decreased;step length decreased;stride length decreased  -CW tracey decreased;step length decreased;stride length decreased  -CW     Gait, Safety Issues supplemental O2  -CW supplemental O2  -CW     Recorded by [CW] Jarrod Taylor [CW] Jarrod Taylor     Positioning and Restraints     Pre-Treatment Position in bed  -CW in bed  -CW     Post Treatment Position bed  -CW bed  -CW     In Bed notified nsg;supine;call light within reach;encouraged to call for assist;with other staff  -CW notified nsg;supine;call light within reach;encouraged to call for assist;exit alarm on  -CW     Recorded by [CW] Jarrod Taylor [CW] Jarrod Taylor       User Key  (r) = Recorded By, (t) = Taken By, (c) = Cosigned By    Initials Name Effective Dates    CW Jarrod Taylor 12/13/16 -                 IP PT Goals       04/12/17 1427          Gait Training PT LTG    Gait Training Goal PT LTG, Date Established 04/12/17  -AR      Gait Training Goal PT LTG, Time to Achieve 1 wk  -AR      Gait Training Goal PT LTG, [place] Level conditional independence  -AR      Gait Training Goal PT LTG, Distance to Achieve 150  -AR      Stair Training PT LTG    Stair Training Goal PT LTG, Date Established 04/12/17  -AR      Stair Training Goal PT LTG, Time to Achieve 1 wk  -AR      Stair Training Goal PT LTG, Number of Steps 10  -AR      Stair Training Goal PT LTG, [place] Level contact guard assist  -AR      Stair Training Goal PT LTG, Assist Device 1 handrail  -AR        User Key  (r) = Recorded By, (t) = Taken By, (c) = Cosigned By    Initials Name Provider Type    AR Amina Clif, PT Physical Therapist          Physical Therapy Education     Title: PT OT SLP Therapies (Done)     Topic: Physical Therapy (Done)     Point: Mobility training (Done)    Learning Progress Summary    Learner Readiness Method Response Comment Documented by Status   Patient Acceptance TB,E,D ANNA MARK 04/18/17 0949 Done    Acceptance E,JAS CAO 04/17/17 1005 Done    Acceptance E VU  JW 04/14/17 1525 Done    Acceptance E VU  JW1 04/14/17 0037 Done    Acceptance E,JAS CAO  CW 04/13/17 1419 Done    Acceptance E VU  JW1 04/12/17 2244 Done    Acceptance E NR  AR 04/12/17 1429 Active               Point: Home exercise program (Done)     Learning Progress Summary    Learner Readiness Method Response Comment Documented by Status   Patient Acceptance TB,E,D VU,DU   04/18/17 0949 Done    Acceptance E,TB DU,VU  CW 04/17/17 1005 Done    Acceptance E VU  JW1 04/14/17 0037 Done    Acceptance E,TB DU,VU  CW 04/13/17 1419 Done    Acceptance E VU  JW1 04/12/17 2244 Done    Acceptance E NR  AR 04/12/17 1429 Active               Point: Body mechanics (Done)    Learning Progress Summary    Learner Readiness Method Response Comment Documented by Status   Patient Acceptance TB,E,D VU,DU   04/18/17 0949 Done    Acceptance E,TB DU,VU  CW 04/17/17 1005 Done    Acceptance E VU  JW1 04/14/17 0037 Done    Acceptance E,TB DU,VU  CW 04/13/17 1419 Done    Acceptance E VU  JW1 04/12/17 2244 Done    Acceptance E NR  AR 04/12/17 1429 Active               Point: Precautions (Done)    Learning Progress Summary    Learner Readiness Method Response Comment Documented by Status   Patient Acceptance TB,E,D VU,DU   04/18/17 0949 Done    Acceptance E,TB DU,VU  CW 04/17/17 1005 Done    Acceptance E VU  JW1 04/14/17 0037 Done    Acceptance E,TB DU,VU  CW 04/13/17 1419 Done    Acceptance E VU  [facility] 04/12/17 2244 Done    Acceptance E NR  AR 04/12/17 1429 Active                      User Key     Initials Effective Dates Name Provider Type Discipline     02/18/16 -  Ayaka Rodriguez, PTA Physical Therapy Assistant PT    AR 06/27/16 -  Amina Menezes, PT Physical Therapist PT    [facility] 07/06/16 -  Alejandro Barth, RN Registered Nurse Nurse     12/13/16 -  Jarrod Taylor Physical Therapy Assistant PT                    PT Recommendation and Plan  Anticipated Discharge Disposition: skilled nursing facility, home with assist, home with home health (may benefit from DC to [facility] pending progress)  Planned Therapy Interventions: balance training, bed mobility training, gait training, home exercise program, patient/family education, stair training, ROM (Range of Motion), strengthening  PT  Frequency: 5 times/wk  Plan of Care Review  Plan Of Care Reviewed With: patient  Progress: progress toward functional goals as expected  Outcome Summary/Follow up Plan: Pt increased with strength and transfer safety to RWX and improving with amb safety and distance with RWX          Outcome Measures       04/18/17 0900 04/17/17 1000       How much help from another person do you currently need...    Turning from your back to your side while in flat bed without using bedrails? 4  -CW 4  -CW     Moving from lying on back to sitting on the side of a flat bed without bedrails? 4  -CW 4  -CW     Moving to and from a bed to a chair (including a wheelchair)? 4  -CW 4  -CW     Standing up from a chair using your arms (e.g., wheelchair, bedside chair)? 4  -CW 4  -CW     Climbing 3-5 steps with a railing? 3  -CW 3  -CW     To walk in hospital room? 3  -CW 3  -CW     AM-PAC 6 Clicks Score 22  -CW 22  -CW     Functional Assessment    Outcome Measure Options AM-PAC 6 Clicks Basic Mobility (PT)  -CW AM-PAC 6 Clicks Basic Mobility (PT)  -CW       User Key  (r) = Recorded By, (t) = Taken By, (c) = Cosigned By    Initials Name Provider Type    CW Jarrod Taylor Physical Therapy Assistant           Time Calculation:         PT Charges       04/18/17 0950          Time Calculation    Start Time 0933  -CW      Stop Time 0950  -CW      Time Calculation (min) 17 min  -CW      PT Received On 04/18/17  -CW      PT - Next Appointment 04/19/17  -CW        User Key  (r) = Recorded By, (t) = Taken By, (c) = Cosigned By    Initials Name Provider Type    CW Jarrod Taylor Physical Therapy Assistant          Therapy Charges for Today     Code Description Service Date Service Provider Modifiers Qty    67643020411 HC PT THER PROC EA 15 MIN 4/17/2017 Jarrod Taylor GP 1    22551851954 HC PT THER SUPP EA 15 MIN 4/17/2017 Jarrod Taylor GP 1    77540788133 HC PT THER PROC EA 15 MIN 4/18/2017 Jarrod Taylor GP 1    90138254770 HC PT  THER SUPP EA 15 MIN 4/18/2017 Jarrod Taylor GP 1          PT G-Codes  Outcome Measure Options: AM-PAC 6 Clicks Basic Mobility (PT)    Jarrod Taylor  4/18/2017

## 2017-04-18 NOTE — PROGRESS NOTES
Continued Stay Note  [facility]     Patient Name: Bharti Roche  MRN: [id number]  Today's Date: 4/18/2017    Admit Date: 4/6/2017          Discharge Plan       04/18/17 1621    Case Management/Social Work Plan    Plan Return to group home    Patient/Family In Agreement With Plan yes    Additional Comments Patient ambulated 250' with PT today.  Now on 3.5 l of O2.  Plan remains to return to group home at [place].  CCP will continue to follow.              Discharge Codes     None            Becky S. [place], RN

## 2017-04-18 NOTE — PROGRESS NOTES
LOS: 12 days   Patient Care Team:  James Epley, APRN as PCP - General  James Epley, APRN as PCP - Family Medicine        Subjective: Patient.  Awake and alert today she says she is breathing well she is still on 3-1/2 L nasal cannula O2 denies any chest pain really she has some pain with an IV infusion yesterday it sounds like it was the potassium.  Not certain.  Patient denies any cough and says she just finished her home nurses she is eating way more than she should eat    Objective     Vital Signs  Temp:  [97.6 °F (36.4 °C)-98.5 °F (36.9 °C)] 98.5 °F (36.9 °C)  Heart Rate:  [75-87] 77  Resp:  [16-20] 18  BP: ()/(53-71) 97/57  Body mass index is 39 kg/(m^2).    Intake/Output Summary (Last 24 hours) at 04/18/17 1724  Last data filed at 04/18/17 0500   Gross per 24 hour   Intake              120 ml   Output                0 ml   Net              120 ml          Physical Exam:  General Appearance: Well-developed white female she looks several decades older than her stated age.  She is resting comfortably on 3.5 L nasal cannula O2 saturations are about 94-95% while I was in the room  Eyes: Conjunctiva clear and anicteric pupils are equal and reactive  ENT: Oral mucous membranes are moist no erythema or exudates Mallampati type II airway  Neck: No adenopathy no jugular venous distention or hepatojugular reflux trachea midline  Lungs: Actually pretty clear I don't hear wheezes rales or rhonchi she is nonlabored symmetric expansion again her saturations are 94%  on 3.5 L nasal cannula O2 while was in examining her   Cardiac: Regular rate and rhythm I do not hear murmur rub or gallop  Abdomen: Obese soft no palpable organomegaly or masses  : Not examined  Musc/Skel: Grossly normal  Skin: No jaundice no petechiae no rashes  Neuro: Patient is following commands with all 4 extremities she is pleasant and cooperative she is oriented but I think she has fairly limited cerebral capacity  Extremities/P  Vascular: No clubbing cyanosis or edema she has palpable radial and dorsalis pedis pulses bilaterally  MSE: She seems to be in reasonably good spirits        Labs:  WBC No results found for: WBCS   HGB Hemoglobin   Date Value Ref Range Status   04/16/2017 10.7 (L) 11.9 - 15.5 g/dL Final      HCT Hematocrit   Date Value Ref Range Status   04/16/2017 32.8 (L) 35.6 - 45.5 % Final      Platlets No results found for: LABPLAT     PT/INR:  No results found for: PROTIME/No results found for: INR    Sodium Sodium   Date Value Ref Range Status   04/16/2017 139 136 - 145 mmol/L Final      Potassium Potassium   Date Value Ref Range Status   04/17/2017 4.5 3.5 - 5.2 mmol/L Final   04/16/2017 3.1 (L) 3.5 - 5.2 mmol/L Final      Chloride Chloride   Date Value Ref Range Status   04/16/2017 96 (L) 98 - 107 mmol/L Final      Bicarbonate No results found for: PLASMABICARB   BUN BUN   Date Value Ref Range Status   04/16/2017 12 6 - 20 mg/dL Final      Creatinine Creatinine   Date Value Ref Range Status   04/16/2017 0.72 0.57 - 1.00 mg/dL Final      Calcium Calcium   Date Value Ref Range Status   04/16/2017 9.3 8.6 - 10.5 mg/dL Final      Magnesium No results found for: MG         pH No results found for: PHART   pO2 No results found for: PO2ART   pCO2 No results found for: [id number]   HCO3 No results found for: [id number]       benztropine 1 mg Oral BID   cloZAPine 100 mg Oral Nightly   divalproex 1,000 mg Oral Nightly   divalproex 500 mg Oral Daily   enoxaparin 40 mg Subcutaneous Daily   ipratropium-albuterol 3 mL Nebulization Q4H - RT   nystatin 5 mL Oral 4x Daily   sertraline 100 mg Oral Daily          Diagnostics:  Imaging Results (last 24 hours)     Procedure Component Value Units Date/Time    XR Chest PA & Lateral [[id number]] Collected:  04/18/17 0822     Updated:  04/18/17 0828    Narrative:       PA AND LATERAL CHEST     CLINICAL HISTORY: Cough. COPD. Dyspnea.     Compared to the previous chest x-ray dated 04/10/2017.     The lungs  are somewhat poorly inflated. There are scattered linear areas  of atelectasis in both hilar regions. No focal infiltrates are  identified. There are no pleural effusions. The heart is mildly enlarged  unchanged.     IMPRESSIONS: Poor lung inflation. No acute process is identified.     This report was finalized on 4/18/2017 8:25 AM by Dr. Kalpesh Pillai MD.             Did review her chest radiographs from admission and her chest x-ray today looks like infiltrates edema cleared there is just a few little linear bands of atelectasis in both lungs        Assessment/Plan     Patient Active Problem List   Diagnosis Code   • Atopic rhinitis J30.9   • Anemia D64.9   • Aspiration pneumonia J69.0   • Chronic obstructive pulmonary disease J44.9   • Dysphagia R13.10   • Gastroesophageal reflux disease with esophagitis K21.0   • Heart murmur R01.1   • Hyperglycemia R73.9   • Hyperlipidemia E78.5   • Hypothyroidism E03.9   • Pediculosis capitis B85.0   • Nicotine dependence F17.200   • Osteoporosis M81.0   • Shortness of breath R06.02   • Swallowing impaired R13.10   • Sepsis A41.9       ImPression #1 influenza  B infection and probable pneumonia I will follow up a chest x-ray not done today will reorder  #2 acute hypoxemic and hypercapnic respiratory failure seems like it is improved we need to try and wean her O2 aggressively to see exactly what her O2 requirements are she desats with sleep much better when she is awake  #3 Escherichia coli urinary tract infection she was treated repeat urine was clear  #4 schizophrenia I understand the nurses were called by her home concerned that she wasn't getting full doses of her medications.  We have found that the patient is very somnolent  and even with reduced doses night she was very hard to arouse.   I don't want to discontinue the Clozaril and we've held the Seroquel she has been very calm.  He is quite appropriate.  We will continue to monitor  #5 metabolic encephalopathy  improved  #6 dysphagia patient has refused PEG tube placement she is on a dysphagia diet  #7 suspected obstructive sleep apnea the question is can she comply safely with a Pap machine I'm not certain at this time course was dysphagia she is at increased risk of aspiration with positive pressure ventilation.  I think probably not the thing to pursue with her  #8 fluids/electrolytes/nutrition we'll follow up potassium and sodium levels  #9 mild will recommend follow up H&H    #10 tobacco abuse she did admit that apparently  she is still smoking up until time of admission.  smoking cessation has been discussed with the patient.  #11  both mixed obstructive and restrictive pulmonary function studies from what I can glean from the records she has COPD and the restriction was felt to be in part due to obesity. But also see history  noted patient cannot help with history  #12 nocturnal hypoxemia chronic he has beenbeen prescribed 2 L O2 at home for use with sleep apparently she is not real compliant with this therapy  #13 atelectasis this probably contributes to some of her hypoxia I think we can get her up and moving the better this will be    Plan for disposition: We'll monitor him back to the group home I think we need to check an oximetry walking no she drops at night and she's can do need O2 with sleep but we need to see much O2 she needs during the day every time I come in her saturations are good but we don't seem to be getting her weaned down.  Jayden Mcmillan MD  04/18/17  5:24 PM    Time:

## 2017-04-18 NOTE — PLAN OF CARE
Problem: Patient Care Overview (Adult)  Goal: Plan of Care Review  Outcome: Ongoing (interventions implemented as appropriate)    04/18/17 1405   Coping/Psychosocial Response Interventions   Plan Of Care Reviewed With patient   Outcome Evaluation   Outcome Summary/Follow up Plan Patient continues to be stable with 3.5 L [place] NC. Position to increase O2 intake to maintian O2 Sat. VS and labs monitored. Fluids encouraged for BP. Encourage meal intake.       Goal: Adult Individualization and Mutuality  Outcome: Ongoing (interventions implemented as appropriate)  Goal: Discharge Needs Assessment  Outcome: Ongoing (interventions implemented as appropriate)    Problem: Fall Risk (Adult)  Goal: Absence of Falls  Outcome: Ongoing (interventions implemented as appropriate)    Problem: Respiratory Insufficiency (Adult)  Goal: Acid/Base Balance  Outcome: Ongoing (interventions implemented as appropriate)  Goal: Effective Ventilation  Outcome: Ongoing (interventions implemented as appropriate)

## 2017-04-18 NOTE — PLAN OF CARE
Problem: Patient Care Overview (Adult)  Goal: Plan of Care Review  Outcome: Ongoing (interventions implemented as appropriate)    04/18/17 0999   Coping/Psychosocial Response Interventions   Plan Of Care Reviewed With patient   Patient Care Overview   Progress progress toward functional goals as expected   Outcome Evaluation   Outcome Summary/Follow up Plan Pt increased with strength and transfer safety to RWX and improving with amb safety and distance with RWX

## 2017-04-19 NOTE — PLAN OF CARE
Problem: Patient Care Overview (Adult)  Goal: Plan of Care Review  Outcome: Ongoing (interventions implemented as appropriate)    04/19/17 0902   Coping/Psychosocial Response Interventions   Plan Of Care Reviewed With patient   Patient Care Overview   Progress progress toward functional goals as expected   Outcome Evaluation   Outcome Summary/Follow up Plan Pt increasing with bed mobility and transfer safety to [facility]

## 2017-04-19 NOTE — PROGRESS NOTES
Acute Care - Physical Therapy Treatment Note  [facility]     Patient Name: Bharti Roche  : 1955  MRN: [id number]  Today's Date: 2017  Onset of Illness/Injury or Date of Surgery Date: 17          Admit Date: 2017    Visit Dx:    ICD-10-CM ICD-9-CM   1. Sepsis, due to unspecified organism A41.9 038.9     995.91   2. Hypoxia R09.02 799.02   3. Urinary tract infection without hematuria, site unspecified N39.0 599.0   4. Fever, unspecified fever cause R50.9 780.60   5. COPD exacerbation J44.1 491.21     Patient Active Problem List   Diagnosis   • Atopic rhinitis   • Anemia   • Aspiration pneumonia   • Chronic obstructive pulmonary disease   • Dysphagia   • Gastroesophageal reflux disease with esophagitis   • Heart murmur   • Hyperglycemia   • Hyperlipidemia   • Hypothyroidism   • Pediculosis capitis   • Nicotine dependence   • Osteoporosis   • Shortness of breath   • Swallowing impaired   • Sepsis               Adult Rehabilitation Note       17 0900 17 0900 17 1000    Rehab Assessment/Intervention    Discipline physical therapy assistant  -CW physical therapy assistant  -CW physical therapy assistant  -CW    Document Type therapy note (daily note)  -CW therapy note (daily note)  -CW therapy note (daily note)  -CW    Subjective Information no complaints;agree to therapy  -CW no complaints;agree to therapy  -CW agree to therapy;complains of;fatigue  -CW    Patient Effort, Rehab Treatment adequate  -CW adequate  -CW adequate  -CW    Precautions/Limitations fall precautions;oxygen therapy device and L/min  -CW fall precautions;oxygen therapy device and L/min  -CW fall precautions;oxygen therapy device and L/min  -CW    Recorded by [CW] Jarrod Taylor [CW] Jarrod Taylor [CW] Jarrod Taylor    Vital Signs    O2 Delivery Pre Treatment supplemental O2  -CW supplemental O2  -CW supplemental O2  -CW    Recorded by [CW] Jarrod Taylor [CW] Jarrod Taylor [CW]  Jarrod Taylor    Pain Assessment    Pain Assessment No/denies pain  -CW No/denies pain  -CW No/denies pain  -CW    Recorded by [CW] Jarrod Taylor [CW] Jarrod Taylor [CW] Jarrod Taylor    Cognitive Assessment/Intervention    Current Cognitive/Communication Assessment impaired  -CW impaired  -CW impaired  -CW    Orientation Status oriented x 4  -CW oriented x 4  -CW oriented x 4  -CW    Follows Commands/Answers Questions 100% of the time;able to follow single-step instructions;needs cueing  -% of the time;able to follow single-step instructions;needs cueing  -% of the time;able to follow single-step instructions;needs cueing  -CW    Personal Safety mild impairment;decreased awareness, need for safety;decreased insight to deficits  -CW mild impairment;decreased awareness, need for safety;decreased insight to deficits  -CW mild impairment;decreased awareness, need for safety;decreased insight to deficits  -CW    Personal Safety Interventions fall prevention program maintained;gait belt;nonskid shoes/slippers when out of bed  -CW fall prevention program maintained;gait belt;nonskid shoes/slippers when out of bed  -CW fall prevention program maintained;gait belt;nonskid shoes/slippers when out of bed  -CW    Recorded by [CW] Jarrod Taylor [CW] Jarrod Taylor [CW] Jarrod Taylor    Bed Mobility, Assessment/Treatment    Bed Mob, Supine to Sit, Knott supervision required  -CW supervision required  -CW supervision required  -CW    Bed Mob, Sit to Supine, Knott supervision required  -CW supervision required  -CW supervision required  -CW    Recorded by [CW] Jarrod Taylor [CW] Jarrod Taylor [CW] Jarrod Taylor    Transfer Assessment/Treatment    Transfers, Sit-Stand Knott stand by assist  -CW stand by assist  -CW stand by assist  -CW    Transfers, Stand-Sit Knott stand by assist  -CW stand by assist  -CW stand by assist  -CW    Transfers,  Sit-Stand-Sit, Assist Device rolling walker  -CW rolling walker  -CW rolling walker  -CW    Recorded by [CW] Jarrod Taylor [CW] Jarrod Taylor [CW] Jarrod Taylor    Gait Assessment/Treatment    Gait, Lyman Level contact guard assist  -CW contact guard assist  -CW contact guard assist  -CW    Gait, Assistive Device rolling walker  -CW rolling walker  -CW rolling walker  -CW    Gait, Distance (Feet) 250  -  -  -CW    Gait, Gait Deviations tracey decreased;step length decreased;stride length decreased  -CW tracey decreased;step length decreased;stride length decreased  -CW tracey decreased;step length decreased;stride length decreased  -CW    Gait, Safety Issues supplemental O2  -CW supplemental O2  -CW supplemental O2  -CW    Recorded by [CW] Jarrod Taylor [CW] Jarrod Taylor [CW] Jarrod Taylor    Positioning and Restraints    Pre-Treatment Position in bed  -CW in bed  -CW in bed  -CW    Post Treatment Position bed  -CW bed  -CW bed  -CW    In Bed notified nsg;supine;call light within reach;encouraged to call for assist  -CW notified nsg;supine;call light within reach;encouraged to call for assist;with other staff  -CW notified nsg;supine;call light within reach;encouraged to call for assist;exit alarm on  -CW    Recorded by [CW] Jarrod Taylor [CW] Jarrod Taylor [CW] Jarrod Taylor      User Key  (r) = Recorded By, (t) = Taken By, (c) = Cosigned By    Initials Name Effective Dates    CW Jarrod Taylor 12/13/16 -                 IP PT Goals       04/12/17 1427          Gait Training PT LTG    Gait Training Goal PT LTG, Date Established 04/12/17  -AR      Gait Training Goal PT LTG, Time to Achieve 1 wk  -AR      Gait Training Goal PT LTG, Lyman Level conditional independence  -AR      Gait Training Goal PT LTG, Distance to Achieve 150  -AR      Stair Training PT LTG    Stair Training Goal PT LTG, Date Established 04/12/17  -AR      Stair Training  Goal PT LTG, Time to Achieve 1 wk  -AR      Stair Training Goal PT LTG, Number of Steps 10  -AR      Stair Training Goal PT LTG, Curry Level contact guard assist  -AR      Stair Training Goal PT LTG, Assist Device 1 handrail  -AR        User Key  (r) = Recorded By, (t) = Taken By, (c) = Cosigned By    Initials Name Provider Type    AR Amina Clif, PT Physical Therapist          Physical Therapy Education     Title: PT OT SLP Therapies (Done)     Topic: Physical Therapy (Done)     Point: Mobility training (Done)    Learning Progress Summary    Learner Readiness Method Response Comment Documented by Status   Patient Acceptance E,TB DU,VU  CW 04/19/17 0902 Done    Acceptance TB,E,D VU,DU  CW 04/18/17 0949 Done    Acceptance E,TB DU,VU  CW 04/17/17 1005 Done    Acceptance E VU  JW 04/14/17 1525 Done    Acceptance E VU  JW1 04/14/17 0037 Done    Acceptance E,TB DU,VU  CW 04/13/17 1419 Done    Acceptance E VU  1 04/12/17 2244 Done    Acceptance E NR  AR 04/12/17 1429 Active               Point: Home exercise program (Done)    Learning Progress Summary    Learner Readiness Method Response Comment Documented by Status   Patient Acceptance E,TB DU,VU  CW 04/19/17 0902 Done    Acceptance TB,E,D VU,DU  CW 04/18/17 0949 Done    Acceptance E,TB DU,VU  CW 04/17/17 1005 Done    Acceptance E VU  JW1 04/14/17 0037 Done    Acceptance E,TB DU,VU  CW 04/13/17 1419 Done    Acceptance E VU  1 04/12/17 2244 Done    Acceptance E NR  AR 04/12/17 1429 Active               Point: Body mechanics (Done)    Learning Progress Summary    Learner Readiness Method Response Comment Documented by Status   Patient Acceptance E,TB DU,VU  CW 04/19/17 0902 Done    Acceptance TB,E,D VU,DU  CW 04/18/17 0949 Done    Acceptance E,TB DU,VU  CW 04/17/17 1005 Done    Acceptance E VU  JW1 04/14/17 0037 Done    Acceptance E,TB DU,VU  CW 04/13/17 1419 Done    Acceptance E VU  JW1 04/12/17 2244 Done    Acceptance E NR  AR 04/12/17 1429 Active                Point: Precautions (Done)    Learning Progress Summary    Learner Readiness Method Response Comment Documented by Status   Patient Acceptance E,TB DU,VU  CW 04/19/17 0902 Done    Acceptance TB,E,D VU,DU   04/18/17 0949 Done    Acceptance E,TB DU,VU  CW 04/17/17 1005 Done    Acceptance E VU  JW1 04/14/17 0037 Done    Acceptance E,TB DU,VU  CW 04/13/17 1419 Done    Acceptance E VU  JW1 04/12/17 2244 Done    Acceptance E NR  AR 04/12/17 1429 Active                      User Key     Initials Effective Dates Name Provider Type Discipline     02/18/16 -  Ayaka Rodriguez, PTA Physical Therapy Assistant PT    AR 06/27/16 -  Amina Menezes, PT Physical Therapist PT    [facility] 07/06/16 -  Alejandro Barth, KARTHIKEYAN Registered Nurse Nurse     12/13/16 -  Jarrod Taylor Physical Therapy Assistant PT                    PT Recommendation and Plan  Anticipated Discharge Disposition: skilled nursing facility, home with assist, home with home health (may benefit from DC to [facility] pending progress)  Planned Therapy Interventions: balance training, bed mobility training, gait training, home exercise program, patient/family education, stair training, ROM (Range of Motion), strengthening  PT Frequency: 5 times/wk  Plan of Care Review  Plan Of Care Reviewed With: patient  Progress: progress toward functional goals as expected  Outcome Summary/Follow up Plan: Pt increasing with bed mobility and transfer safety to [facility]          Outcome Measures       04/19/17 0900 04/18/17 0900 04/17/17 1000    How much help from another person do you currently need...    Turning from your back to your side while in flat bed without using bedrails? 4  -CW 4  -CW 4  -CW    Moving from lying on back to sitting on the side of a flat bed without bedrails? 4  -CW 4  -CW 4  -CW    Moving to and from a bed to a chair (including a wheelchair)? 4  -CW 4  -CW 4  -CW    Standing up from a chair using your arms (e.g., wheelchair, bedside chair)? 4  -CW 4  -CW 4  -CW     Climbing 3-5 steps with a railing? 3  -CW 3  -CW 3  -CW    To walk in hospital room? 3  -CW 3  -CW 3  -CW    AM-PAC 6 Clicks Score 22  -CW 22  -CW 22  -CW    Functional Assessment    Outcome Measure Options AM-PAC 6 Clicks Basic Mobility (PT)  -CW AM-PAC 6 Clicks Basic Mobility (PT)  -CW AM-PAC 6 Clicks Basic Mobility (PT)  -CW      User Key  (r) = Recorded By, (t) = Taken By, (c) = Cosigned By    Initials Name Provider Type    CW Jarrod Taylor Physical Therapy Assistant           Time Calculation:         PT Charges       04/19/17 0904          Time Calculation    Start Time 0835  -CW      Stop Time 0900  -CW      Time Calculation (min) 25 min  -CW      PT Received On 04/19/17  -CW      PT - Next Appointment 04/20/17  -CW        User Key  (r) = Recorded By, (t) = Taken By, (c) = Cosigned By    Initials Name Provider Type    CW Jarrod Taylor Physical Therapy Assistant          Therapy Charges for Today     Code Description Service Date Service Provider Modifiers Qty    97701862345 HC PT THER PROC EA 15 MIN 4/18/2017 Jarrod Taylor GP 1    37066473337 HC PT THER SUPP EA 15 MIN 4/18/2017 Jarrod Taylor GP 1    17533343532 HC PT THER PROC EA 15 MIN 4/19/2017 Jarrod Taylor GP 2    83456923856 HC PT THER SUPP EA 15 MIN 4/19/2017 Jarrod Taylor GP 2          PT G-Codes  Outcome Measure Options: AM-PAC 6 Clicks Basic Mobility (PT)    Jarrod Taylor  4/19/2017

## 2017-04-19 NOTE — PROGRESS NOTES
LOS: 13 days   Patient Care Team:  James Epley, APRN as PCP - General  James Epley, APRN as PCP - Family Medicine        Subjective: Says she is feeling really good says she is eating well she feels like she has more energy than she has in long time.    Objective     Vital Signs  Temp:  [96.8 °F (36 °C)-98.8 °F (37.1 °C)] 96.8 °F (36 °C)  Heart Rate:  [67-84] 84  Resp:  [16-20] 20  BP: (113-124)/(66-68) 113/66  Body mass index is 39 kg/(m^2).  No intake or output data in the 24 hours ending 04/19/17 1710       Physical Exam:  General Appearance: Well-developed white female she looks several decades older than her stated age.  She is resting comfortably on 4 L nasal cannula O2 saturations are about 95% while I was in the room but apparently on room air she drops into the upper 70s  Eyes: Conjunctiva clear and anicteric pupils are equal and reactive  ENT: Oral mucous membranes are moist no erythema or exudates Mallampati type II airway  Neck: No adenopathy no jugular venous distention or hepatojugular reflux trachea midline  Lungs: Actually pretty clear I don't hear wheezes rales or rhonchi she is nonlabored symmetric expansion   Cardiac: Regular rate and rhythm I do not hear murmur rub or gallop  Abdomen: Obese soft no palpable organomegaly or masses  : Not examined  Musc/Skel: Grossly normal  Skin: No jaundice no petechiae no rashes  Neuro: Patient is following commands with all 4 extremities she is pleasant and cooperative she is oriented but I think she has fairly limited cerebral capacity  Extremities/P Vascular: No clubbing cyanosis or edema she has palpable radial and dorsalis pedis pulses bilaterally  MSE: She seems to be in reasonably good spirits        Labs:  WBC No results found for: WBCS   HGB No results found for: HGB   HCT No results found for: HCT   Platlets No results found for: LABPLAT     PT/INR:  No results found for: PROTIME/No results found for: INR    Sodium No results found for: NA    Potassium Potassium   Date Value Ref Range Status   04/17/2017 4.5 3.5 - 5.2 mmol/L Final      Chloride No results found for: CL   Bicarbonate No results found for: PLASMABICARB   BUN No results found for: BUN   Creatinine No results found for: CREATININE   Calcium No results found for: CALCIUM   Magnesium No results found for: MG         pH No results found for: PHART   pO2 No results found for: PO2ART   pCO2 No results found for: ABB0CAX   HCO3 No results found for: FYM3PCZ       benztropine 1 mg Oral BID   cloZAPine 100 mg Oral Nightly   divalproex 1,000 mg Oral Nightly   divalproex 500 mg Oral Daily   enoxaparin 40 mg Subcutaneous Daily   ipratropium-albuterol 3 mL Nebulization Q4H - RT   nystatin 5 mL Oral 4x Daily   sertraline 100 mg Oral Daily          Diagnostics:  Imaging Results (last 24 hours)     ** No results found for the last 24 hours. **                Assessment/Plan     Patient Active Problem List   Diagnosis Code   • Atopic rhinitis J30.9   • Anemia D64.9   • Aspiration pneumonia J69.0   • Chronic obstructive pulmonary disease J44.9   • Dysphagia R13.10   • Gastroesophageal reflux disease with esophagitis K21.0   • Heart murmur R01.1   • Hyperglycemia R73.9   • Hyperlipidemia E78.5   • Hypothyroidism E03.9   • Pediculosis capitis B85.0   • Nicotine dependence F17.200   • Osteoporosis M81.0   • Shortness of breath R06.02   • Swallowing impaired R13.10   • Sepsis A41.9       ImPression #1 influenza  B infection and probable pneumonia I will follow up a chest x-ray not done today will reorder  #2 acute hypoxemic and hypercapnic respiratory failure and it sounds like maybe I was misunderstanding that apparently now her that maybe she was on oxygen continuously for or was supposed to be.  She is doing well I think her current hypoxia in part is due to her atelectasis and I think that will improve I think getting her up and moving around to be the best thing for that  #3 Escherichia coli urinary tract  infection she was treated repeat urine was clear  #4 schizophrenia I understand the nurses were called by her home concerned that she wasn't getting full doses of her medications.  We have found that the patient is very somnolent  and even with reduced doses night she was very hard to arouse.   I don't want to discontinue the Clozaril and we've held the Seroquel she has been very calm.  He is quite appropriate.  We will continue to monitor  #5 metabolic encephalopathy improved  #6 dysphagia patient has refused PEG tube placement she is on a dysphagia diet  #7 suspected obstructive sleep apnea the question is can she comply safely with a Pap machine I'm not certain at this time course was dysphagia she is at increased risk of aspiration with positive pressure ventilation.  I think probably not the thing to pursue with her  #8 fluids/electrolytes/nutrition we'll follow up potassium and sodium levels  #9 mild will recommend follow up H&H    #10 tobacco abuse she did admit that apparently  she is still smoking up until time of admission.  smoking cessation has been discussed with the patient.  #11  both mixed obstructive and restrictive pulmonary function studies from what I can glean from the records she has COPD and the restriction was felt to be in part due to obesity. But also see history  noted patient cannot help with history  #12 nocturnal hypoxemia chronic he has beenbeen prescribed 2 L O2 at home for use with sleep apparently she is not real compliant with this therapy  #13 atelectasis this probably contributes to some of her hypoxia I think we can get her up and moving the better this will be.    Plan for disposition: Patient is ready for discharge but will need to arrange continuous O2 to late to get O2 sat up in her home today we will shoot for tomorrow  Jayden Mcmillan MD  04/19/17  5:10 PM    Time:

## 2017-04-19 NOTE — NURSING NOTE
Pt removed off O2 by RT.  Within 5 min resting O2 sat was 73%.  Place back on 4L NC and sats came up to 86% initially and then climbed to 92%.

## 2017-04-19 NOTE — NURSING NOTE
Spoke with Dr. Mcmillan regarding patient O2 saturation and need for 4L O2.  No new orders received at this time, but he will be looking into the issue.  RT did not complete walking O2 due to low O2.  Notified Dr. Mcmillan of that as well.  Will continue to monitor.

## 2017-04-19 NOTE — PROGRESS NOTES
Continued Stay Note  [facility]     Patient Name: Bharti Roche  MRN: [id number]  Today's Date: 4/19/2017    Admit Date: 4/6/2017          Discharge Plan       04/19/17 1409    Case Management/Social Work Plan    Plan Return to group home    Patient/Family In Agreement With Plan yes    Additional Comments Per chart, patient appears to be nearing dc.  Call placed to [facility] ( [phone]) and message left for patient's nurse, Heather, to update.  Anticipate patient will need continuous o2.                Discharge Codes     None            Cat Cyr, RN

## 2017-04-19 NOTE — PLAN OF CARE
Problem: Patient Care Overview (Adult)  Goal: Plan of Care Review  Outcome: Ongoing (interventions implemented as appropriate)    04/19/17 1022   Outcome Evaluation   Outcome Summary/Follow up Plan Pt has slept most of shift. No c/o pain. Awakes to voice and follows commands. Taking PO meds with no problem. Walked with PT today. Still onL o2 NC. Will continue to monitor.        Goal: Adult Individualization and Mutuality  Outcome: Ongoing (interventions implemented as appropriate)  Goal: Discharge Needs Assessment  Outcome: Ongoing (interventions implemented as appropriate)    Problem: Fall Risk (Adult)  Goal: Absence of Falls  Outcome: Ongoing (interventions implemented as appropriate)    Problem: Respiratory Insufficiency (Adult)  Goal: Acid/Base Balance  Outcome: Ongoing (interventions implemented as appropriate)  Goal: Effective Ventilation  Outcome: Ongoing (interventions implemented as appropriate)

## 2017-04-19 NOTE — PROGRESS NOTES
Continued Stay Note  [facility]     Patient Name: Bharti Roche  MRN: [id number]  Today's Date: 4/19/2017    Admit Date: 4/6/2017          Discharge Plan       04/19/17 1737    Case Management/Social Work Plan    Additional Comments Ashlee would like  to follow and requests [employer].  Message left for Genia.  CCP to follow up in AM.      04/19/17 1733    Case Management/Social Work Plan    Additional Comments Spoke with ashlee/Tato and she is aware that patient will likely be dc'd tomorrow.  Patient will need continuous o2.  Maria Del Rosario/Bobby to follow up in AM.  Ashlee also requesting that concentrator be serviced.  She is also asking about the patient's purse.  She describes it as black canvas.  Nursing aware.  CCP will follow.               Discharge Codes     None            Cat Cyr RN

## 2017-04-20 NOTE — DISCHARGE SUMMARY
Discharge summary      Discharge diagnoses  #1 influenza A and B infection and pneumonia  #2 acute on chronic hypoxemic and hypercapnic respiratory failure  #3 Escherichia coli urinary tract infection  #4 schizophrenia  #5 acute metabolic encephalopathy  #6 dysphagia  #7 probable obstructive sleep apnea  #8 anemia  #9 tobacco abuse  #10 COPD  #11 atelectasis      Hospital course:    Patient presented with respiratory failure acute hypoxemic and hypercapnic increased confusion and lethargy she has a history of schizophrenia we held many of her medications because she was so obtunded she did improve with therapy she still has some respiratory failure she apparently supposed to be on 2 L O2 at home although she admits she doesn't use it all the time I looks like she's probably would be best on about 4 L now.  Part of that is because she desaturates worse when she sleeps I suspect she has some sleep apnea but I don't think she is given a being a good candidate for Pap machine 1 I don't think she'll be able to manage herself but more importantly she has a history of dysphagia and I'm afraid if she is not real cognizant and even if she is she can have some significant increased risk of aspiration and aspiration pneumonia.  Her oxygen may be able to be weaned she also has a little atelectasis at that she gets up and around will improve.  Actually must stop smoking and she really can't smoke when she is on oxygen least should not and I have discussed all this with her she's done well with the nicotine patch here.  I will set her metabolic encephalopathy is cleared but she still pretty lethargic I have her on reduced medications we have not had to use any Seroquel and the Clozaril is only 100 mg as opposed to 300 at bedtime she says she feels better and more awake with this she has had no real behavior issues agitation here to suggest that she would need a higher dose of course about to be followed as an outpatient.  She did  have a urinary tract infection with Escherichia coli she was treated with antibiotics and follow-up urine was clear.  Patient is doing much better and I think she is ready to discharge back to home she has been to call her sister Barrie to notify her of discharge and condition which I have done follow-up with her primary care in about 8 days

## 2017-04-20 NOTE — PLAN OF CARE
Problem: Patient Care Overview (Adult)  Goal: Plan of Care Review  Outcome: Ongoing (interventions implemented as appropriate)    04/19/17 0902 04/19/17 2006 04/20/17 0642   Coping/Psychosocial Response Interventions   Plan Of Care Reviewed With --  patient --    Patient Care Overview   Progress progress toward functional goals as expected --  --    Outcome Evaluation   Outcome Summary/Follow up Plan --  --  Pt resting comfortably. Atempts to ween o2 down to 2 L have been unsuccessful. Pt still on 4L o2. Possible DC today. Monitor Labs and vitals.       Goal: Discharge Needs Assessment  Outcome: Ongoing (interventions implemented as appropriate)    Problem: Fall Risk (Adult)  Goal: Absence of Falls  Outcome: Ongoing (interventions implemented as appropriate)    Problem: Respiratory Insufficiency (Adult)  Goal: Acid/Base Balance  Outcome: Ongoing (interventions implemented as appropriate)  Goal: Effective Ventilation  Outcome: Ongoing (interventions implemented as appropriate)

## 2017-04-20 NOTE — THERAPY DISCHARGE NOTE
Acute Care - Physical Therapy Treatment Note/Discharge  [facility]     Patient Name: Bharti Roche  : 1955  MRN: [id number]  Today's Date: 2017  Onset of Illness/Injury or Date of Surgery Date: 17          Admit Date: 2017    Visit Dx:    ICD-10-CM ICD-9-CM   1. Sepsis, due to unspecified organism A41.9 038.9     995.91   2. Hypoxia R09.02 799.02   3. Urinary tract infection without hematuria, site unspecified N39.0 599.0   4. Fever, unspecified fever cause R50.9 780.60   5. COPD exacerbation J44.1 491.21   6. Acute on chronic respiratory failure with hypoxia J96.21 518.84     799.02     Patient Active Problem List   Diagnosis   • Atopic rhinitis   • Anemia   • Aspiration pneumonia   • Chronic obstructive pulmonary disease   • Dysphagia   • Gastroesophageal reflux disease with esophagitis   • Heart murmur   • Hyperglycemia   • Hyperlipidemia   • Hypothyroidism   • Pediculosis capitis   • Nicotine dependence   • Osteoporosis   • Shortness of breath   • Swallowing impaired   • Sepsis       Physical Therapy Education     Title: PT OT SLP Therapies (Resolved)     Topic: Physical Therapy (Resolved)     Point: Mobility training (Resolved)    Learning Progress Summary    Learner Readiness Method Response Comment Documented by Status   Patient Acceptance E,TB DU,VU  CW 17 0902 Done    Acceptance TBED VU,DU  CW 17 0949 Done    Acceptance E,TB DU,VU  CW 17 1005 Done    Acceptance E VU  JW 17 1525 Done    Acceptance E VU  JW1 17 0037 Done    Acceptance E,TB DU,VU  CW 17 1419 Done    Acceptance E VU  JW1 17 2244 Done    Acceptance E NR  AR 17 1429 Active               Point: Home exercise program (Resolved)    Learning Progress Summary    Learner Readiness Method Response Comment Documented by Status   Patient Acceptance E,TB DU,VU  CW 17 0902 Done    Acceptance TBED VU,DU  CW 17 0949 Done    Acceptance E,TB DU,VU  CW 17 1005 Done     Acceptance E VU  [facility] 04/14/17 0037 Done    Acceptance E,TB DU,VU  CW 04/13/17 1419 Done    Acceptance E VU  JW1 04/12/17 2244 Done    Acceptance E NR  AR 04/12/17 1429 Active               Point: Body mechanics (Resolved)    Learning Progress Summary    Learner Readiness Method Response Comment Documented by Status   Patient Acceptance E,TB DU,VU  CW 04/19/17 0902 Done    Acceptance TB,E,D VU,DU  CW 04/18/17 0949 Done    Acceptance E,TB DU,VU  CW 04/17/17 1005 Done    Acceptance E VU  JW1 04/14/17 0037 Done    Acceptance E,TB DU,VU  CW 04/13/17 1419 Done    Acceptance E VU  [facility] 04/12/17 2244 Done    Acceptance E NR  AR 04/12/17 1429 Active               Point: Precautions (Resolved)    Learning Progress Summary    Learner Readiness Method Response Comment Documented by Status   Patient Acceptance E,TB DU,VU  CW 04/19/17 0902 Done    Acceptance TB,E,D VU,DU  CW 04/18/17 0949 Done    Acceptance E,TB DU,VU  CW 04/17/17 1005 Done    Acceptance E VU  [facility] 04/14/17 0037 Done    Acceptance E,TB DU,VU  CW 04/13/17 1419 Done    Acceptance E VU  [facility] 04/12/17 2244 Done    Acceptance E NR  AR 04/12/17 1429 Active                      User Key     Initials Effective Dates Name Provider Type Discipline     02/18/16 -  Ayaka Rodriguez PTA Physical Therapy Assistant PT    AR 06/27/16 -  Amina Menezes, PT Physical Therapist PT    [facility] 07/06/16 -  Alejandro Barth, KARTHIKEYAN Registered Nurse Nurse     12/13/16 -  Jarrod Talyor Physical Therapy Assistant PT                    IP PT Goals       04/20/17 1311 04/12/17 1427       Gait Training PT LTG    Gait Training Goal PT LTG, Date Established  04/12/17  -AR     Gait Training Goal PT LTG, Time to Achieve  1 wk  -AR     Gait Training Goal PT LTG, [place] Level  conditional independence  -AR     Gait Training Goal PT LTG, Distance to Achieve  150  -AR     Gait Training Goal PT LTG, Outcome goal not met  -CW      Gait Training Goal PT LTG, Reason Goal Not Met discharged from  facility  -CW      Stair Training PT LTG    Stair Training Goal PT LTG, Date Established  04/12/17  -AR     Stair Training Goal PT LTG, Time to Achieve  1 wk  -AR     Stair Training Goal PT LTG, Number of Steps  10  -AR     Stair Training Goal PT LTG, [place] Level  contact guard assist  -AR     Stair Training Goal PT LTG, Assist Device  1 handrail  -AR     Stair Training Goal PT LTG, Outcome goal not met  -CW      Stair Training Goal PT LTG, Reason Goal Not Met discharged from facility  -CW        User Key  (r) = Recorded By, (t) = Taken By, (c) = Cosigned By    Initials Name Provider Type    AR Amina Menezes, PT Physical Therapist    CW Jarrod Taylor Physical Therapy Assistant              Adult Rehabilitation Note       04/19/17 0900 04/18/17 0900       Rehab Assessment/Intervention    Discipline physical therapy assistant  -CW physical therapy assistant  -CW     Document Type therapy note (daily note)  -CW therapy note (daily note)  -CW     Subjective Information no complaints;agree to therapy  -CW no complaints;agree to therapy  -CW     Patient Effort, Rehab Treatment adequate  -CW adequate  -CW     Precautions/Limitations fall precautions;oxygen therapy device and L/min  -CW fall precautions;oxygen therapy device and L/min  -CW     Recorded by [CW] Jarrod Taylor [CW] Jarrod Taylor     Vital Signs    O2 Delivery Pre Treatment supplemental O2  -CW supplemental O2  -CW     Recorded by [CW] Jarrod Taylor [CW] Jarrod Taylor     Pain Assessment    Pain Assessment No/denies pain  -CW No/denies pain  -CW     Recorded by [CW] Jarrod Taylor [CW] Jarrod Taylor     Cognitive Assessment/Intervention    Current Cognitive/Communication Assessment impaired  -CW impaired  -CW     Orientation Status oriented x 4  -CW oriented x 4  -CW     Follows Commands/Answers Questions 100% of the time;able to follow single-step instructions;needs cueing  -% of the time;able to follow  single-step instructions;needs cueing  -CW     Personal Safety mild impairment;decreased awareness, need for safety;decreased insight to deficits  -CW mild impairment;decreased awareness, need for safety;decreased insight to deficits  -CW     Personal Safety Interventions fall prevention program maintained;gait belt;nonskid shoes/slippers when out of bed  -CW fall prevention program maintained;gait belt;nonskid shoes/slippers when out of bed  -CW     Recorded by [CW] Jarrod Taylor [CW] Jarrod Taylor     Bed Mobility, Assessment/Treatment    Bed Mob, Supine to Sit, [place] supervision required  -CW supervision required  -CW     Bed Mob, Sit to Supine, [place] supervision required  -CW supervision required  -CW     Recorded by [CW] Jarrod Taylor [CW] Jarrod Taylor     Transfer Assessment/Treatment    Transfers, Sit-Stand [place] stand by assist  -CW stand by assist  -CW     Transfers, Stand-Sit [place] stand by assist  -CW stand by assist  -CW     Transfers, Sit-Stand-Sit, Assist Device rolling walker  -CW rolling walker  -CW     Recorded by [CW] Jarrod Taylor [CW] Jarrod Taylor     Gait Assessment/Treatment    Gait, [place] Level contact guard assist  -CW contact guard assist  -CW     Gait, Assistive Device rolling walker  -CW rolling walker  -CW     Gait, Distance (Feet) 250  -  -CW     Gait, Gait Deviations tracey decreased;step length decreased;stride length decreased  -CW tracey decreased;step length decreased;stride length decreased  -CW     Gait, Safety Issues supplemental O2  -CW supplemental O2  -CW     Recorded by [CW] Jarrod Taylor [CW] Jarrod Taylor     Positioning and Restraints    Pre-Treatment Position in bed  -CW in bed  -CW     Post Treatment Position bed  -CW bed  -CW     In Bed notified nsg;supine;call light within reach;encouraged to call for assist  -CW notified nsg;supine;call light within reach;encouraged to call for  assist;with other staff  -CW     Recorded by [CW] Jarrod Taylor [CW] Jarrod Taylor       User Key  (r) = Recorded By, (t) = Taken By, (c) = Cosigned By    Initials Name Effective Dates    CW Jarrod Taylor 12/13/16 -           PT Recommendation and Plan  Anticipated Discharge Disposition: skilled nursing facility, home with assist, home with home health (may benefit from DC to [facility] pending progress)  Planned Therapy Interventions: balance training, bed mobility training, gait training, home exercise program, patient/family education, stair training, ROM (Range of Motion), strengthening  PT Frequency: 5 times/wk  Plan of Care Review  Plan Of Care Reviewed With: patient  Progress: progress toward functional goals as expected  Outcome Summary/Follow up Plan: Pt increasing with bed mobility and transfer safety to [facility]          Outcome Measures       04/19/17 0900 04/18/17 0900       How much help from another person do you currently need...    Turning from your back to your side while in flat bed without using bedrails? 4  -CW 4  -CW     Moving from lying on back to sitting on the side of a flat bed without bedrails? 4  -CW 4  -CW     Moving to and from a bed to a chair (including a wheelchair)? 4  -CW 4  -CW     Standing up from a chair using your arms (e.g., wheelchair, bedside chair)? 4  -CW 4  -CW     Climbing 3-5 steps with a railing? 3  -CW 3  -CW     To walk in hospital room? 3  -CW 3  -CW     AM-PAC 6 Clicks Score 22  -CW 22  -CW     Functional Assessment    Outcome Measure Options AM-PAC 6 Clicks Basic Mobility (PT)  -CW AM-PAC 6 Clicks Basic Mobility (PT)  -CW       User Key  (r) = Recorded By, (t) = Taken By, (c) = Cosigned By    Initials Name Provider Type    CW Jarrod Taylor Physical Therapy Assistant           Time Calculation:       Therapy Charges for Today     Code Description Service Date Service Provider Modifiers Qty    17729715724  PT THER PROC EA 15 MIN 4/19/2017 Jarrod HANCOCK  Brandon GP 2    94589541434 HC PT THER SUPP EA 15 MIN 4/19/2017 Jarrod Taylor GP 2          PT G-Codes  Outcome Measure Options: AM-PAC 6 Clicks Basic Mobility (PT)    PT Discharge Summary  Reason for Discharge: Discharge from facility  Outcomes Achieved: Refer to plan of care for updates on goals achieved  Discharge Destination: Home with assist    Jarrod Taylor  4/20/2017

## 2017-04-20 NOTE — PLAN OF CARE
Problem: Inpatient Physical Therapy  Goal: Gait Training Goal LTG- PT  Outcome: Unable to achieve outcome(s) by discharge Date Met:  04/20/17 04/20/17 1311   Gait Training PT LTG   Gait Training Goal PT LTG, Outcome goal not met   Gait Training Goal PT LTG, Reason Goal Not Met discharged from facility       Goal: Stair Training Goal LTG- PT  Outcome: Unable to achieve outcome(s) by discharge Date Met:  04/20/17 04/20/17 1311   Stair Training PT LTG   Stair Training Goal PT LTG, Outcome goal not met   Stair Training Goal PT LTG, Reason Goal Not Met discharged from facility

## 2017-04-20 NOTE — PROGRESS NOTES
LOS: 14 days   Patient Care Team:  James Epley, APRN as PCP - General  James Epley, APRN as PCP - Family Medicine        Subjective: Patient is wide awake and alert very pleasant today she denies any shortness of breath on O2 minimal cough no sputum.  She very much would like to go home today    Objective     Vital Signs  Temp:  [96.8 °F (36 °C)-98.7 °F (37.1 °C)] 98 °F (36.7 °C)  Heart Rate:  [70-84] 79  Resp:  [16-20] 16  BP: (105-118)/(63-77) 106/68  Body mass index is 39 kg/(m^2).  No intake or output data in the 24 hours ending 04/20/17 1143       Physical Exam:  General Appearance: Well-developed white female she looks several decades older than her stated age.  She is resting comfortably on 4 L nasal cannula O2 saturations are about 96%   Eyes: Conjunctiva clear and anicteric pupils are equal and reactive  ENT: Oral mucous membranes are moist no erythema or exudates Mallampati type II airway  Neck: No adenopathy no jugular venous distention or hepatojugular reflux trachea midline  Lungs: Actually pretty clear I don't hear wheezes rales or rhonchi she is nonlabored symmetric expansion   Cardiac: Regular rate and rhythm I do not hear murmur rub or gallop  Abdomen: Obese soft no palpable organomegaly or masses  : Not examined  Musc/Skel: Grossly normal  Skin: No jaundice no petechiae no rashes  Neuro: Patient is following commands with all 4 extremities she is pleasant and cooperative she is oriented but I think she has fairly limited cerebral capacity  Extremities/P Vascular: No clubbing cyanosis or edema she has palpable radial and dorsalis pedis pulses bilaterally  MSE: She seems to be in reasonably good spirits        Labs:  WBC No results found for: WBCS   HGB No results found for: HGB   HCT No results found for: HCT   Platlets No results found for: LABPLAT     PT/INR:  No results found for: PROTIME/No results found for: INR    Sodium No results found for: NA   Potassium No results found for: K    Chloride No results found for: CL   Bicarbonate No results found for: PLASMABICARB   BUN No results found for: BUN   Creatinine No results found for: CREATININE   Calcium No results found for: CALCIUM   Magnesium No results found for: MG         pH No results found for: PHART   pO2 No results found for: PO2ART   pCO2 No results found for: VML2SIK   HCO3 No results found for: [id number]       benztropine 1 mg Oral BID   cloZAPine 100 mg Oral Nightly   divalproex 1,000 mg Oral Nightly   divalproex 500 mg Oral Daily   enoxaparin 40 mg Subcutaneous Daily   ipratropium-albuterol 3 mL Nebulization Q4H - RT   nystatin 5 mL Oral 4x Daily   sertraline 100 mg Oral Daily          Diagnostics:  Imaging Results (last 24 hours)     ** No results found for the last 24 hours. **                Assessment/Plan     Patient Active Problem List   Diagnosis Code   • Atopic rhinitis J30.9   • Anemia D64.9   • Aspiration pneumonia J69.0   • Chronic obstructive pulmonary disease J44.9   • Dysphagia R13.10   • Gastroesophageal reflux disease with esophagitis K21.0   • Heart murmur R01.1   • Hyperglycemia R73.9   • Hyperlipidemia E78.5   • Hypothyroidism E03.9   • Pediculosis capitis B85.0   • Nicotine dependence F17.200   • Osteoporosis M81.0   • Shortness of breath R06.02   • Swallowing impaired R13.10   • Sepsis A41.9       ImPression #1 influenza  B infection and probable pneumonia   #2 acute hypoxemic and hypercapnic respiratory failure and it sounds like maybe I was misunderstanding that apparently now her that maybe she was on oxygen continuously for or was supposed to be.  She is doing well I think her current hypoxia in part is due to her atelectasis and I think that will improve I think getting her up and moving around to be the best thing for that.  I think she is safe for home with O2 at this point  #3 Escherichia coli urinary tract infection she was treated repeat urine was clear  #4 schizophrenia I understand the nurses were  called by her home concerned that she wasn't getting full doses of her medications.  We have found that the patient is very somnolent  and even with reduced doses night she was very hard to arouse.   I don't want to discontinue the Clozaril and we've held the Seroquel she has been very calm.  sHe is quite appropriate.  We will continue to monitor.  Patient says she feels better and more awake with the decreased medications.  #5 metabolic encephalopathy improved  #6 dysphagia patient has refused PEG tube placement she is on a dysphagia diet  #7 suspected obstructive sleep apnea the question is can she comply safely with a Pap machine I'm not certain at this time course was dysphagia she is at increased risk of aspiration with positive pressure ventilation.  I think probably not the thing to pursue with her  #8 fluids/electrolytes/nutrition we'll follow up potassium and sodium levels  #9 mild will recommend follow up H&H    #10 tobacco abuse she did admit that apparently  she is still smoking up until time of admission.  smoking cessation has been discussed with the patient.  #11  both mixed obstructive and restrictive pulmonary function studies from what I can glean from the records she has COPD and the restriction was felt to be in part due to obesity. But also see history  noted patient cannot help with history  #12 chronic respiratory failure I did confirm with her sister flew out of that she is been told to use oxygen all the time in the past  #13 atelectasis this probably contributes to some of her hypoxia I think we can get her up and moving the better this will be.    Plan for disposition: Discharged today  Jayden Mcmillan MD  04/20/17  11:43 AM    Time:

## 2017-05-01 PROBLEM — J18.8 OTHER PNEUMONIA, UNSPECIFIED ORGANISM: Status: ACTIVE | Noted: 2017-01-01

## 2017-05-01 PROBLEM — J96.01 ACUTE RESPIRATORY FAILURE WITH HYPOXIA (HCC): Status: ACTIVE | Noted: 2017-01-01

## 2017-08-11 PROBLEM — G93.41 METABOLIC ENCEPHALOPATHY: Status: ACTIVE | Noted: 2017-01-01

## 2017-08-11 PROBLEM — C50.919 BREAST CANCER (HCC): Status: ACTIVE | Noted: 2017-01-01

## 2017-08-11 PROBLEM — E11.9 DIABETES MELLITUS (HCC): Status: ACTIVE | Noted: 2017-01-01

## 2017-08-11 PROBLEM — R41.82 ALTERED MENTAL STATUS: Status: ACTIVE | Noted: 2017-01-01

## 2017-08-11 PROBLEM — R09.02 HYPOXEMIA: Status: ACTIVE | Noted: 2017-01-01

## 2017-08-11 PROBLEM — C79.9 METASTATIC DISEASE (HCC): Status: ACTIVE | Noted: 2017-01-01

## 2017-08-11 PROBLEM — Z66 DNR (DO NOT RESUSCITATE): Status: ACTIVE | Noted: 2017-01-01

## 2017-08-11 NOTE — ED PROVIDER NOTES
EMERGENCY DEPARTMENT ENCOUNTER       CHIEF COMPLAINT  Chief Complaint: Altered Mental Status  History given by: Paramedics  History limited by: Patient's mental status change   Room Number: 32/32  PMD: Catracho Bell MD      HPI:  HPI Comments: Per paramedics, pt is a NH resident with h/o COPD. Pt presents to the ED with altered mental status. The time of onset of pt's symptoms is unknown. Pt's baseline mental status is also unknown. NH staff has noticed that pt has been agitated and confused. En route to the ED today, paramedics administered Narcan to pt with no improvement in pt's mental status. Paramedics also noticed that pt's systolic BP was in the 80s; however, upon pt's arrival to the ER, pt's BP is 104/55.     Patient is a 62 y.o. female presenting with altered mental status.   Altered Mental Status   Presenting symptoms: confusion    Severity:  Moderate  Most recent episode:  Today  Episode history:  Unable to specify  Duration: time of onset of sx is unknown.  Timing:  Constant  Progression:  Unchanged  Context: nursing home resident    Associated symptoms: agitation          PAST MEDICAL HISTORY  Active Ambulatory Problems     Diagnosis Date Noted   • Atopic rhinitis 02/09/2016   • Anemia 02/09/2016   • Aspiration pneumonia 02/09/2016   • Chronic obstructive pulmonary disease 02/09/2016   • Dysphagia 02/09/2016   • Gastroesophageal reflux disease with esophagitis 02/09/2016   • Heart murmur 02/09/2016   • Hyperglycemia 02/09/2016   • Hyperlipidemia 02/09/2016   • Hypothyroidism 02/09/2016   • Pediculosis capitis 02/09/2016   • Nicotine dependence 02/09/2016   • Osteoporosis 02/09/2016   • Shortness of breath 02/09/2016   • Swallowing impaired 05/12/2016   • Sepsis 04/06/2017   • Other pneumonia, unspecified organism 05/01/2017   • Acute respiratory failure with hypoxia 05/01/2017     Resolved Ambulatory Problems     Diagnosis Date Noted   • No Resolved Ambulatory Problems     Past Medical History:    Diagnosis Date   • Abdominal pain    • Abscess of chest wall    • Acute bronchitis with bronchospasm    • Anemia 02/09/2016   • Asthma    • Breast cancer    • Chronic bronchitis    • Cryptogenic organizing pneumonia    • Depression    • Diabetes mellitus    • Esophageal reflux    • High blood cholesterol level    • Influenza    • Osteoporosis    • Paranoid schizophrenia    • Pneumonia          PAST SURGICAL HISTORY  Past Surgical History:   Procedure Laterality Date   • MASTECTOMY RADICAL Bilateral     Breast cancer   • OTHER SURGICAL HISTORY      breats surgery mastectomy         FAMILY HISTORY  Family History   Problem Relation Age of Onset   • Cancer Mother      non-Hodgkin's lymphoma   • Prostate cancer Father    • Lung cancer Father    • Esophageal cancer Brother          SOCIAL HISTORY  Social History     Social History   • Marital status: Single     Spouse name: N/A   • Number of children: 2   • Years of education: N/A     Occupational History   • disabled      Social History Main Topics   • Smoking status: Current Every Day Smoker     Packs/day: 2.00     Types: Cigarettes   • Smokeless tobacco: Not on file      Comment: starting smoking again   • Alcohol use No   • Drug use: No   • Sexual activity: No     Other Topics Concern   • Not on file     Social History Narrative   • No narrative on file         ALLERGIES  Review of patient's allergies indicates no known allergies.        REVIEW OF SYSTEMS  Review of Systems   Unable to perform ROS: Mental status change   Psychiatric/Behavioral: Positive for agitation and confusion.            PHYSICAL EXAM  ED Triage Vitals   Temp Heart Rate Resp BP SpO2   08/11/17 1642 08/11/17 1642 08/11/17 1642 08/11/17 1642 08/11/17 1642   99.9 °F (37.7 °C) 120 20 104/55 95 %      Temp src Heart Rate Source Patient Position BP Location FiO2 (%)   -- -- -- -- --              Physical Exam   Constitutional: No distress.   Pt is slightly restless.    HENT:   Head: Normocephalic.  "  Mouth/Throat: Mucous membranes are normal.   Eyes: EOM are normal. Pupils are equal, round, and reactive to light.   Neck: Normal range of motion. Neck supple.   Cardiovascular: Normal rate, regular rhythm and normal heart sounds.    Pulmonary/Chest: Effort normal and breath sounds normal. No respiratory distress. She has no decreased breath sounds. She has no wheezes. She has no rhonchi. She has no rales.   Abdominal: Soft. There is no tenderness. There is no rebound and no guarding.   Musculoskeletal: Normal range of motion.   Neurological: She is alert. She has normal sensation.   Pt attempts to follow commands, but only verbally responds, \"yeah\". Pt moves all extremities equally.    Skin: Skin is warm and dry.   Psychiatric:   Unable to assess due to pt's altered mental status.    Nursing note and vitals reviewed.          LAB RESULTS  Recent Results (from the past 24 hour(s))   Blood Gas, Arterial    Collection Time: 08/11/17  5:21 PM   Result Value Ref Range    Site Arterial: right radial     Ayaan's Test Positive     pH, Arterial 7.480 (H) 7.350 - 7.450 pH units    pCO2, Arterial 40.5 35.0 - 45.0 mm Hg    pO2, Arterial 57.8 (L) 80.0 - 100.0 mm Hg    HCO3, Arterial 30.1 (H) 22.0 - 28.0 mmol/L    Base Excess, Arterial 6.1 (H) 0.0 - 2.0 mmol/L    O2 Saturation Calculated 91.4 (L) 92.0 - 99.0 %    Barometric Pressure for Blood Gas 749.3 mmHg    Modality Cannula     Flow Rate 2 lpm    Rate 16 Breaths/minute   Comprehensive Metabolic Panel    Collection Time: 08/11/17  5:28 PM   Result Value Ref Range    Glucose 97 65 - 99 mg/dL    BUN 27 (H) 8 - 23 mg/dL    Creatinine 0.63 0.57 - 1.00 mg/dL    Sodium 144 136 - 145 mmol/L    Potassium 3.4 (L) 3.5 - 5.2 mmol/L    Chloride 98 98 - 107 mmol/L    CO2 29.9 (H) 22.0 - 29.0 mmol/L    Calcium 8.9 8.6 - 10.5 mg/dL    Total Protein 7.9 6.0 - 8.5 g/dL    Albumin 2.90 (L) 3.50 - 5.20 g/dL    ALT (SGPT) 5 1 - 33 U/L    AST (SGOT) 18 1 - 32 U/L    Alkaline Phosphatase 198 (H) " 39 - 117 U/L    Total Bilirubin 0.2 0.1 - 1.2 mg/dL    eGFR Non African Amer 96 >60 mL/min/1.73    Globulin 5.0 gm/dL    A/G Ratio 0.6 g/dL    BUN/Creatinine Ratio 42.9 (H) 7.0 - 25.0    Anion Gap 16.1 mmol/L   Urinalysis With / Culture If Indicated    Collection Time: 08/11/17  5:28 PM   Result Value Ref Range    Color, UA Dark Yellow (A) Yellow, Straw    Appearance, UA Cloudy (A) Clear    pH, UA 5.5 5.0 - 8.0    Specific Gravity, UA >=1.030 1.005 - 1.030    Glucose, UA Negative Negative    Ketones, UA 40 mg/dL (2+) (A) Negative    Bilirubin, UA Negative Negative    Blood, UA Negative Negative    Protein, UA 30 mg/dL (1+) (A) Negative    Leuk Esterase, UA Trace (A) Negative    Nitrite, UA Positive (A) Negative    Urobilinogen, UA 1.0 E.U./dL 0.2 - 1.0 E.U./dL   Procalcitonin    Collection Time: 08/11/17  5:28 PM   Result Value Ref Range    Procalcitonin 0.08 (L) 0.10 - 0.25 ng/mL   Lactic Acid, Plasma    Collection Time: 08/11/17  5:28 PM   Result Value Ref Range    Lactate 1.0 0.5 - 2.0 mmol/L   BNP    Collection Time: 08/11/17  5:28 PM   Result Value Ref Range    proBNP 120.9 0.0 - 900.0 pg/mL   Troponin    Collection Time: 08/11/17  5:28 PM   Result Value Ref Range    Troponin T <0.010 0.000 - 0.030 ng/mL   Valproic Acid Level, Total    Collection Time: 08/11/17  5:28 PM   Result Value Ref Range    Valproic Acid 40.0 (L) 50.0 - 125.0 mcg/mL   CBC Auto Differential    Collection Time: 08/11/17  5:28 PM   Result Value Ref Range    WBC 9.88 4.50 - 10.70 10*3/mm3    RBC 3.29 (L) 3.90 - 5.20 10*6/mm3    Hemoglobin 8.9 (L) 11.9 - 15.5 g/dL    Hematocrit 29.7 (L) 35.6 - 45.5 %    MCV 90.3 80.5 - 98.2 fL    MCH 27.1 26.9 - 32.0 pg    MCHC 30.0 (L) 32.4 - 36.3 g/dL    RDW 19.9 (H) 11.7 - 13.0 %    RDW-SD 65.3 (H) 37.0 - 54.0 fl    MPV 10.6 6.0 - 12.0 fL    Platelets 359 140 - 500 10*3/mm3   Scan Slide    Collection Time: 08/11/17  5:28 PM   Result Value Ref Range    Scan Slide     Urinalysis, Microscopic Only     Collection Time: 08/11/17  5:28 PM   Result Value Ref Range    RBC, UA None Seen None Seen, 0-2 /HPF    WBC, UA 0-2 None Seen, 0-2 /HPF    Bacteria, UA None Seen None Seen /HPF    Squamous Epithelial Cells, UA 3-6 (A) None Seen, 0-2 /HPF    Hyaline Casts, UA None Seen None Seen /LPF    Mucus, UA Moderate/2+ (A) None Seen, Trace /HPF    Methodology Manual Light Microscopy    Manual Differential    Collection Time: 08/11/17  5:28 PM   Result Value Ref Range    Neutrophil % 70.0 42.7 - 76.0 %    Lymphocyte % 21.0 19.6 - 45.3 %    Monocyte % 8.0 5.0 - 12.0 %    Eosinophil % 1.0 0.3 - 6.2 %    Neutrophils Absolute 6.92 1.90 - 8.10 10*3/mm3    Lymphocytes Absolute 2.08 0.90 - 4.80 10*3/mm3    Monocytes Absolute 0.79 0.20 - 1.20 10*3/mm3    Eosinophils Absolute 0.10 0.00 - 0.70 10*3/mm3    nRBC 3.0 (H) 0.0 - 0.0 /100 WBC    Hypochromia Mod/2+ None Seen    Polychromasia Slight/1+ None Seen    WBC Morphology Normal Normal    Platelet Morphology Normal Normal       Ordered the above labs and reviewed the results.        RADIOLOGY  CT Head Without Contrast   Preliminary Result       1. This exam is motion degraded. This limits evaluation even despite   repeat imaging through the head. There is minimal small vessel disease   in the cerebral white matter. There is a 14 x 10 mm ovoid lucent area in   the left superior parietal bone along the left superior vertex of the   skull with adjacent soft tissue thickening of the scalp and this finding   is new when compared to head CT 4 months ago on 04/06/2017 and the fact   that it is new over the last 4 months suggests that it is myelomatous or   metastatic skull lesion. Correlate with physical exam and a   contrast-enhanced MRI of the head could be obtained to further   characterize. In reviewing today's chest CT there are multiple lytic and   blastic lesions in the spine that are new when compared to a chest CT   06/26/2016 indicating new osseous metastatic disease and the patient  has   an enlarged spleen that is new when compared to 01/26/2016 suggesting   either myelofibrosis or myeloproliferative disorder and the findings   must be correlated clinically. The remainder of the head CT is   unremarkable.       The results were communicated to Dr. Solares in the emergency room by   telephone on 08/11/2017 at 6:20 PM.        Interpreted by radiologist. Discussed with radiologist. Independently viewed by me.             CT Chest Without Contrast   Final Result   Addendum 1 of 1   CORRECTION: due to computer voice recognition error:       HISTORY: 62-YEAR-OLD obese female...       ADDENDUM:       FINDINGS:   4. Left humeral head demonstrated apparent metastatic disease on plain   film imaging, this area is not well seen on CT but they are abnormal   suggesting metastatic disease.   6. Diffuse sclerotic bony metastasis involving multiple vertebral   bodies.   7. Somewhat subtle sternal fracture only appreciated on the parasagittal   images.   8.       This report was finalized on 8/11/2017 6:36 PM by Dr. Marin Chery MD.          Final   1. Acute interstitial disease hiatus and edema versus infectious or   inflammatory change.   2. Splenomegaly.   3. No left lung mass nor active airspace.   4. Right middle lobe nodule measuring 6 mm image #34       Recommend referral to [facility].       This report was finalized on 8/11/2017 6:16 PM by Dr. Marin Chery MD.    Interpreted by radiologist. Discussed with radiologist. Independently viewed by me.         XR Chest 1 View   Final Result    1. Question new mass left lung inferiorly measuring 29 mm, otherwise  stable negative acute chest.  2. Previous right breast surgery and right axillary dissection.  3. Chronic left perihilar scarring and atelectasis.  4. Limited imaging due to body habitus, low lung volumes and portable  technique.  5. Question of interval development of left proximal humeral  metastasis    Interpreted by radiologist. Independently viewed by me.                Ordered the above noted radiological studies. Reviewed by me in PACS.       PROCEDURES  Procedures    EKG:           EKG time: 17:29  Rhythm/Rate: Sinus tachycardia rate 110  P waves and LA: Normal P waves  QRS, axis: LVH   ST and T waves: Nonspecific ST-T changes     Interpreted Contemporaneously by me, independently viewed  Similar compared to prior 04/06/17          PROGRESS AND CONSULTS  ED Course     5:06 PM:  Ordered blood work, ABG, blood cultures, lactic acid, procalcitonin, UA, CXR, CT Head, and EKG for further evaluation. Tylenol ordered as pt has temperature of 99.9 F. IV fluids ordered to hydrate pt. Pt is not a candidate for tPA as the time of onset of pt's symptoms is unknown.     5:51 PM:  Obtained pt's CXR results. Ordered CT Chest for further evaluation.     7:59 PM:  Pt's CT Chest suggests metastatic disease. Pt is hypoxic on room air. Placed call to [facility] for admission. Decision time to admit: Now.     8:07 PM:  Discussed case with Dr. Driver, hospitalist. He will admit pt to a telemetry bed.     8:18 PM:  Rechecked pt. Pt's POA is at bedside. Per POA, pt has had intermittent episodes of AMS for several weeks. Informed pt's POA that pt has been hypoxic in the ER (pt is currently on O2 nasal cannula). CT Chest shows metastatic disease. Pt has been admitted to the hospitalist for further evaluation. POA agrees with plan.             MEDICAL DECISION MAKING      MDM  Number of Diagnoses or Management Options  Altered mental status, unspecified altered mental status type:   Hypoxemia:   Metabolic encephalopathy:   Metastatic disease - new:      Amount and/or Complexity of Data Reviewed  Clinical lab tests: ordered and reviewed (Troponin is negative. )  Tests in the radiology section of CPT®: ordered and reviewed (CXR:   1. Question new mass left lung inferiorly measuring 29 mm, otherwise  stable negative acute chest.  2.  Previous right breast surgery and right axillary dissection.  3. Chronic left perihilar scarring and atelectasis.  4. Limited imaging due to body habitus, low lung volumes and portable  technique.  5. Question of interval development of left proximal humeral metastasis)  Tests in the medicine section of CPT®: ordered and reviewed (EKG interpreted.   )  Discussion of test results with the performing providers: yes (CT Head and CT Chest results d/w radiologist.   )  Obtain history from someone other than the patient: yes (Paramedics provide significant hx. )  Discuss the patient with other providers: yes (Case d/w Dr. Driver, hospitalist, who will admit pt to a telemetry bed.   )    Patient Progress  Patient progress: stable             DIAGNOSIS  Final diagnoses:   Altered mental status, unspecified altered mental status type   Hypoxemia   Metastatic disease - new   Metabolic encephalopathy         DISPOSITION  Pt admitted to telemetry.      ADMISSION    Discussed treatment plan and reason for admission with pt/family and admitting physician.  Pt/family voiced understanding of the plan for admission for further testing/treatment as needed.             Latest Documented Vital Signs:  As of 8:07 PM  BP- 114/67 HR- 95 Temp- 99.9 °F (37.7 °C) O2 sat- 91%        --  Documentation assistance provided by gonzalo Farmer for Dr. Beck MD.  Information recorded by the gonzalo was done at my direction and has been verified and validated by me.                    Yovany Farmer  08/11/17 2025       Thien Solares MD  08/11/17 2235

## 2017-08-11 NOTE — ED NOTES
Nursing home reports increase in agitation & confusion, and anemia for past week. EMS report hypotension during transport.     Deborah Vargas RN  08/11/17 [id number]

## 2017-08-12 NOTE — PLAN OF CARE
Problem: Patient Care Overview (Adult)  Goal: Plan of Care Review  Outcome: Ongoing (interventions implemented as appropriate)    08/12/17 0048   Coping/Psychosocial Response Interventions   Plan Of Care Reviewed With patient;healthcare surrogate   Patient Care Overview   Progress no change   Outcome Evaluation   Outcome Summary/Follow up Plan notified patient's sister (health care surrogate) r/t patient AMS, increase oxygen to keep sats >88%; monitor labs and administer meds per dr. order         Problem: Tissue Perfusion, Ineffective Peripheral (Adult)  Goal: Identify Related Risk Factors and Signs and Symptoms  Outcome: Ongoing (interventions implemented as appropriate)    08/12/17 0048   Tissue Perfusion, Ineffective Peripheral   Tissue Perfusion, Ineffective Peripheral: Related Risk Factors hypoventilation;disease process   Signs and Symptoms (Ineffective Peripheral Tissue Perfusion) other (see comments)  (hypoxia)       Goal: Adequate Tissue Perfusion  Outcome: Ongoing (interventions implemented as appropriate)    08/12/17 0048   Tissue Perfusion, Ineffective Peripheral (Adult)   Adequate Tissue Perfusion making progress toward outcome         Problem: Fall Risk (Adult)  Goal: Identify Related Risk Factors and Signs and Symptoms  Outcome: Ongoing (interventions implemented as appropriate)    08/12/17 0048   Fall Risk   Fall Risk: Related Risk Factors age-related changes;gait/mobility problems;sleep pattern alteration;environment unfamiliar   Fall Risk: Signs and Symptoms presence of risk factors       Goal: Absence of Falls  Outcome: Ongoing (interventions implemented as appropriate)    08/12/17 0048   Fall Risk (Adult)   Absence of Falls making progress toward outcome

## 2017-08-12 NOTE — PLAN OF CARE
Problem: Patient Care Overview (Adult)  Goal: Plan of Care Review    08/12/17 1208   Coping/Psychosocial Response Interventions   Plan Of Care Reviewed With patient   Outcome Evaluation   Outcome Summary/Follow up Plan Bedside swallow eval completed. See report for details- ok to initiate modified PO diet.

## 2017-08-12 NOTE — PROGRESS NOTES
"Pharmacokinetic Consult - Vancomycin Dosing (Initial Note)    Bharti PAULA Jane Lew has a consult for pharmacy to dose vancomycin for PNA.  Pharmacy dosing vancomycin per Dr. Driver's request.   Goal trough: 15-20 mg/L       Relevant clinical data and objective history reviewed:  62 y.o. female 66\" (167.6 cm) 220 lb (99.8 kg)    Past Medical History:   Diagnosis Date   • Abdominal pain    • Abscess of chest wall    • Acute bronchitis with bronchospasm    • Anemia 02/09/2016   • Asthma    • Breast cancer     Breast cancer, ER/MT positive   • Chronic bronchitis    • Cryptogenic organizing pneumonia    • Depression    • Diabetes mellitus    • Esophageal reflux    • High blood cholesterol level     reported cholesterol level was high   • Influenza    • Osteoporosis    • Paranoid schizophrenia    • Pneumonia      Creatinine   Date Value Ref Range Status   08/11/2017 0.63 0.57 - 1.00 mg/dL Final     BUN   Date Value Ref Range Status   08/11/2017 27 (H) 8 - 23 mg/dL Final     Estimated Creatinine Clearance: 113.2 mL/min (by C-G formula based on Cr of 0.63).    Lab Results   Component Value Date    WBC 9.88 08/11/2017     Temp Readings from Last 3 Encounters:   08/11/17 98 °F (36.7 °C) (Oral)   04/20/17 98 °F (36.7 °C) (Oral)   01/27/17 98.9 °F (37.2 °C) (Oral)          Assessment/Plan  Will start vancomycin 2500 mg IV x1, followed by 1750mg IV q12h. Vancomycin level to be drawn 1230 8/13. Pharmacy will continue to follow daily while on vancomycin and adjust as needed.     Mariana France, [facility]    "

## 2017-08-12 NOTE — THERAPY EVALUATION
Acute Care - Speech Language Pathology   Swallow Initial Evaluation [facility]     Patient Name: Bharti Roche  : 1955  MRN: [id number]  Today's Date: 2017               Admit Date: 2017    SPEECH-LANGUAGE PATHOLOGY EVALUATION - SWALLOW  Subjective: The patient was seen on this date for a Clinical Swallow evaluation.  Patient was alert and cooperative.    The patient's history is significant for COPD, asp PNA, dysphagia, paranoid schizophrenia, breast CA. Admitted with AMS and newly diagnosed bony mets. Last bedside swallow eval at [facility] 2017 recommended fork-mashed/NTL diet.  Objective: Textures given included thin liquid, nectar thick liquid and puree consistency.  Assessment: No s/s of aspiration noted with the exception of delayed cough x1 after thin liquids. Baseline diet is pureed/NTL at NH. OK to re-initiate this diet, though may want to consider allowing PO for QOL if pt's family chooses to pursue palliative care. Either way, feel pt should be allowed water protocol- d/w RN.   SLP Findings:  Patient presents with mild to moderate oropharyngeal dysphagia, without esophageal component.   Recommendations: Diet Textures: nectar thick liquid, puree consistency food.  Medications should be taken whole or crushed with puree. May have water between meals after oral care, under staff or family supervision and with the recommended strategies for safe swallowing.   Recommended Strategies: Upright for PO, small bites and sips and no straw. Oral care before breakfast, after all meals and PRN.  Other Recommended Evaluations: n/a    Dysphagia therapy is not recommended. Rationale: baseline.    Visit Dx:     ICD-10-CM ICD-9-CM   1. Altered mental status, unspecified altered mental status type R41.82 780.97   2. Hypoxemia R09.02 799.02   3. Metastatic disease - new C80.1 199.1   4. Metabolic encephalopathy G93.41 348.31     Patient Active Problem List   Diagnosis   • Atopic rhinitis   • Anemia   •  Aspiration pneumonia   • Chronic obstructive pulmonary disease   • Dysphagia   • Gastroesophageal reflux disease with esophagitis   • Heart murmur   • Hyperglycemia   • Hyperlipidemia   • Hypothyroidism   • Pediculosis capitis   • Nicotine dependence   • Osteoporosis   • Shortness of breath   • Swallowing impaired   • Sepsis   • Other pneumonia, unspecified organism   • Acute respiratory failure with hypoxia   • Altered mental status   • Metabolic encephalopathy   • Hypoxemia   • Metastatic disease   • Breast cancer   • Diabetes mellitus   • DNR (do not resuscitate)     Past Medical History:   Diagnosis Date   • Abdominal pain    • Abscess of chest wall    • Acute bronchitis with bronchospasm    • Anemia 02/09/2016   • Asthma    • Breast cancer     Breast cancer, ER/CT positive   • Chronic bronchitis    • Cryptogenic organizing pneumonia    • Depression    • Diabetes mellitus    • Esophageal reflux    • High blood cholesterol level     reported cholesterol level was high   • Influenza    • Osteoporosis    • Paranoid schizophrenia    • Pneumonia      Past Surgical History:   Procedure Laterality Date   • MASTECTOMY RADICAL Bilateral     Breast cancer   • OTHER SURGICAL HISTORY      breats surgery mastectomy          SWALLOW EVALUATION (last 72 hours)      Swallow Evaluation       08/12/17 1200                Rehab Evaluation    Document Type evaluation  -CP        Subjective Information no complaints  -CP        General Information    Patient Profile Review yes  -CP        Current Diet Limitations NPO  -CP        Prior Level of Function- Swallowing diet modifications- liquid;diet modifications- foods   P/NTL at SNF  -CP        Plans/Goals Discussed With patient;family  -CP        Barriers to Rehab previous functional deficit;cognitive status  -CP        Clinical Impression    Patient's Goals For Discharge patient did not state  -CP        SLP Swallowing Diagnosis mild dysphagia;moderate dysphagia;oral  dysfunction;pharyngeal dysfunction  -CP        Criteria for Skilled Therapeutic Interventions Met current level of function same as previous level of function  -CP        FCM, Swallowing 4-->Level 4  -CP        Therapy Frequency evaluation only  -CP        SLP Diet Recommendation II - pureed;nectar/syrup-thick liquids  -CP        Recommended Feeding/Eating Techniques no straws;small sips/bites  -CP        SLP Rec. for Method of Medication Administration meds crushed in pudding/applesauce;meds whole in pudding/applesauce  -CP        Monitor For Signs Of Aspiration cough;gurgly voice;throat clearing  -CP        Anticipated Discharge Disposition skilled nursing facility  -CP        Cognitive Assessment/Intervention    Current Cognitive/Communication Assessment impaired  -CP        Oral Motor Structure and Function    Oral Motor Anatomy and Physiology patient demonstrates anatomy that is WNL  -CP        Dentition Assessment edentulous, does not have dentures;other (see comments)   no dentures present  -CP        Secretion Management WNL/WFL  -CP        Mucosal Quality dry  -CP        Oral Musculature General Assessment other (see comments)   CNA in isolation but appears grossly functional  -CP          User Key  (r) = Recorded By, (t) = Taken By, (c) = Cosigned By    Initials Name Effective Dates    CP Lauren Johnson MS CCC-SLP 04/13/15 -         EDUCATION  The patient has been educated in the following areas:   Dysphagia (Swallowing Impairment) Oral Care/Hydration Modified Diet Instruction.    SLP Recommendation and Plan  SLP Swallowing Diagnosis: mild dysphagia, moderate dysphagia, oral dysfunction, pharyngeal dysfunction  SLP Diet Recommendation: II - pureed, nectar/syrup-thick liquids  Recommended Feeding/Eating Techniques: no straws, small sips/bites  SLP Rec. for Method of Medication Administration: meds crushed in pudding/applesauce, meds whole in pudding/applesauce  Monitor For Signs Of Aspiration: cough,  gurgly voice, throat clearing     Criteria for Skilled Therapeutic Interventions Met: current level of function same as previous level of function  Anticipated Discharge Disposition: skilled nursing facility     Therapy Frequency: evaluation only             Plan of Care Review  Plan Of Care Reviewed With: patient  Outcome Summary/Follow up Plan: Bedside swallow eval completed. See report for details- ok to initiate modified PO diet.           SLP Outcome Measures (last 72 hours)      SLP Outcome Measures       08/12/17 1200          SLP Outcome Measures    Outcome Measure Used? Adult NOMS  -CP      FCM Scores    FCM Chosen Swallowing  -CP      Swallowing FCM Score 4  -CP        User Key  (r) = Recorded By, (t) = Taken By, (c) = Cosigned By    Initials Name Effective Dates    ONDINA Johnson MS CCC-SLP 04/13/15 -            Time Calculation:         Time Calculation- SLP       08/12/17 1210          Time Calculation- SLP    SLP Start Time 1100  -CP      SLP Stop Time 1200  -CP      SLP Time Calculation (min) 60 min  -CP      SLP Received On 08/12/17  -CP        User Key  (r) = Recorded By, (t) = Taken By, (c) = Cosigned By    Initials Name Provider Type    CP Lauren Johnson MS CCC-SLP Speech and Language Pathologist          Therapy Charges for Today     Code Description Service Date Service Provider Modifiers Qty    [id number] HC ST EVAL ORAL PHARYNG SWALLOW 4 8/12/2017 Lauren Johnson MS CCC-SLP GN 1               Lauren Johnson MS CCC-REESE  8/12/2017

## 2017-08-12 NOTE — H&P
HISTORY AND PHYSICAL   [facility]        Patient Identification:  Name: Bhatri Roche  Age: 62 y.o.  Sex: female  :  1955  MRN: [id number]                     Primary Care Physician: Catracho Bell MD    Chief Complaint:  Altered mental status    History of Present Illness:         The patient's a 62-year-old white female who was sent from nursing home with history of having breast cancer, COPD, diabetes, reflux, dysphagia, paranoid schizophrenia, and history of pneumonia whose admitted with history of altered mental status.  EMS brought the patient in and her blood pressure was a little low in the 80s.  The patient was very confused and was unable to give any history at the time I saw her.  She was short of air and her O2 sats are in the 80s.  She does have advanced directive and is DNR according to old records.  CT scanning showed some possible infiltrates in the lung and some changes consistent with diffuse metastatic disease in the bones.  She has history of breast cancer and has been on Arimidex.  The patient's given some IV fluids and cultures of been obtained and started on broad-spectrum IV antibiotics for healthcare acquired pneumonia.    Past Medical History:  Past Medical History:   Diagnosis Date   • Abdominal pain    • Abscess of chest wall    • Acute bronchitis with bronchospasm    • Anemia 2016   • Asthma    • Breast cancer     Breast cancer, ER/NC positive   • Chronic bronchitis    • Cryptogenic organizing pneumonia    • Depression    • Diabetes mellitus    • Esophageal reflux    • High blood cholesterol level     reported cholesterol level was high   • Influenza    • Osteoporosis    • Paranoid schizophrenia    • Pneumonia      Past Surgical History:  Past Surgical History:   Procedure Laterality Date   • MASTECTOMY RADICAL Bilateral     Breast cancer   • [facility]      Home Meds:  Prescriptions Prior to Admission   Medication  Sig Dispense Refill Last Dose   • alendronate (FOSAMAX) 10 MG tablet TAKE THREE TABLETS BY MOUTH EVERY WEDNESDAY ON EMPTY STOMACH; MUST SIT UP RIGHT FOR 30 MINUTES 12 tablet 3    • anastrozole (ARIMIDEX) 1 MG tablet TAKE 1 TABLET BY MOUTH DAILY 30 tablet 2 Taking   • aspirin 81 MG chewable tablet CHEW ONE TABLET BY MOUTH DAILY 30 tablet 6 Taking   • atorvastatin (LIPITOR) 40 MG tablet TAKE 1 TABLET BY MOUTH DAILY AS DIRECTED FOR HIGH CHOLESTEROL 90 tablet 1 Taking   • benztropine (COGENTIN) 1 MG tablet Take 1 mg by mouth 2 (Two) Times a Day.   Taking   • Calcium Carbonate 1500 (600 CA) MG tablet TAKE 1 TABLET BY MOUTH DAILY 30 tablet 1 Not Taking   • cholecalciferol (VITAMIN D3) 1000 UNITS tablet TAKE 1 TABLET BY MOUTH DAILY 28 tablet 3    • cloZAPine (CLOZARIL) 100 MG tablet Take 1 tablet by mouth Every Night. 30 tablet 0 Taking   • divalproex (DEPAKOTE) 500 MG EC tablet Take 1 tablet by mouth. Take one tablet by mouth every morning and two tablets by mouth every evening   Taking   • ferrous sulfate 325 (65 FE) MG tablet Take 325 mg by mouth 3 (Three) Times a Day With Meals.      • levothyroxine (SYNTHROID, LEVOTHROID) 112 MCG tablet TAKE ONE TABLET BY MOUTH DAILY 30 tablet 5 Taking   • midodrine (PROAMATINE) 5 MG tablet Take 5 mg by mouth 3 (Three) Times a Day.      • sertraline (ZOLOFT) 100 MG tablet Take  by mouth Daily.   Taking   • albuterol (PROVENTIL HFA;VENTOLIN HFA) 108 (90 BASE) MCG/ACT inhaler Ventolin  (90 Base) MCG/ACT Inhalation Aerosol Solution; Patient Sig: Ventolin  (90 Base) MCG/ACT Inhalation Aerosol Solution INHALE 1 TO 2 PUFFS EVERY 4 TO 6 HOURS AS NEEDED.; 2; 5; 13-Aug-2012; Active   Taking   • budesonide (PULMICORT) 0.25 MG/2ML nebulizer solution Inhale 0.5 mg 2 (Two) Times a Day. 0.5 mg/2 ml      • budesonide-formoterol (SYMBICORT) 160-4.5 MCG/ACT inhaler Symbicort 160-4.5 MCG/ACT Inhalation Aerosol; Patient Sig: Symbicort 160-4.5 MCG/ACT Inhalation Aerosol ; 10; 0;  19-Aug-2014; Active   Taking   • docusate sodium (COLACE) 100 MG capsule TAKE 1 CAPSULE BY MOUTH TWICE A DAY AS NEEDED 60 capsule 1 Taking   • nicotine (NICODERM CQ) 21 MG/24HR patch Place 1 patch on the skin every day. Do not smoke with patch, call before running out for decreasing dose 45 patch 0 Not Taking   • nicotine (NICODERM CQ) 21 MG/24HR patch Place 1 patch on the skin Daily. For tobacco cessation 30 patch 1    • omeprazole (priLOSEC) 20 MG capsule Take 20 mg by mouth Daily.      • pantoprazole (PROTONIX) 40 MG EC tablet Take 1 tablet by mouth Daily. 30 tablet 3 Taking   • risperiDONE (RisperDAL) 1 MG tablet Take 2 tablets by mouth nightly.   Taking       Allergies:  No Known Allergies  Immunizations:  Immunization History   Administered Date(s) Administered   • Influenza (IM) Preservative Free 11/08/2016   • Influenza, Unspecified 10/23/2015     Social History:   Social History     Social History Narrative   • No narrative on file     Social History     Social History   • Marital status: Single     Spouse name: N/A   • Number of children: 2   • Years of education: N/A     Occupational History   • disabled      Social History Main Topics   • Smoking status: Current Every Day Smoker     Packs/day: 2.00     Types: Cigarettes   • Smokeless tobacco: Not on file      Comment: starting smoking again   • Alcohol use No   • Drug use: No   • Sexual activity: No     Other Topics Concern   • Not on file     Social History Narrative   • No narrative on file       Family History:  Family History   Problem Relation Age of Onset   • Cancer Mother      non-Hodgkin's lymphoma   • Prostate cancer Father    • Lung cancer Father    • Esophageal cancer Brother         Review of Systems  See history of present illness and past medical history.  Patient Is not able to answer questions and unable to determine information for review of systems due to patient being unable to respond to questions and confused.  Remainder of ROS is  "negative.    Objective:  tMax 24 hrs: Temp (24hrs), Av °F (37.2 °C), Min:98 °F (36.7 °C), Max:99.9 °F (37.7 °C)    Vitals Ranges:   Temp:  [98 °F (36.7 °C)-99.9 °F (37.7 °C)] 98 °F (36.7 °C)  Heart Rate:  [] 93  Resp:  [14-20] 16  BP: ()/(55-70) 103/58      Exam:  /58 (BP Location: Right leg, Patient Position: Lying)  Pulse 93  Temp 98 °F (36.7 °C) (Oral)   Resp 16  Ht 66\" (167.6 cm)  Wt 220 lb (99.8 kg)  SpO2 (!) 88%  BMI 35.51 kg/m2    General Appearance:    Confused and looks short of air, appears stated age   Head:    Normocephalic, without obvious abnormality, atraumatic   Eyes:    PERRL, conjunctiva/corneas clear, EOM's intact, both eyes   Ears:    Normal external ear canals, both ears   Nose:   Nares normal, septum midline, mucosa normal, no drainage    or sinus tenderness   Throat:   Lips, mucosa, and tongue normal   Neck:   Supple, symmetrical, trachea midline, no adenopathy;     thyroid:  no enlargement/tenderness/nodules; no carotid    bruit or JVD   Back:     Symmetric, no curvature, ROM normal, no CVA tenderness   Lungs:     Rhonchi bilaterally, respirations unlabored   Chest Wall:    No tenderness or deformity    Heart:    Regular rate and rhythm, S1 and S2 normal, no murmur, rub   or gallop   Abdomen:     Soft, non-tender, bowel sounds active all four quadrants,     no masses, no hepatomegaly, no splenomegaly   Extremities:   Extremities normal, atraumatic, no cyanosis or edema   Pulses:   2+ and symmetric all extremities   Skin:   Skin color, texture, turgor normal, no rashes or lesions   Lymph nodes:   Cervical, supraclavicular, and axillary nodes normal   Neurologic:   Confused and not answering questions or following commands       .    Data Review:  Lab Results (last 72 hours)     Procedure Component Value Units Date/Time    Blood Gas, Arterial [[id number]]  (Abnormal) Collected:  17    Specimen:  Arterial Blood Updated:  17     Site Arterial: " "right radial     Ayaan's Test Positive     pH, Arterial 7.480 (H) pH units      pCO2, Arterial 40.5 mm Hg      pO2, Arterial 57.8 (L) mm Hg      HCO3, Arterial 30.1 (H) mmol/L      Base Excess, Arterial 6.1 (H) mmol/L      O2 Saturation Calculated 91.4 (L) %      Barometric Pressure for Blood Gas 749.3 mmHg      Modality Cannula     Flow Rate 2 lpm      Rate 16 Breaths/minute     Narrative:       [facility] [address]: [id number] : [id number] Abiodun Suazo    Blood Culture [[id number]] Collected:  08/11/17 1728    Specimen:  Blood from Arm, Left Updated:  08/11/17 1739    Urine Culture [[id number]] Collected:  08/11/17 1728    Specimen:  Urine from Urine, Clean Catch Updated:  08/11/17 1739    Lactic Acid, Plasma [[id number]]  (Normal) Collected:  08/11/17 1728    Specimen:  Blood Updated:  08/11/17 1756     Lactate 1.0 mmol/L     Valproic Acid Level, Total [[id number]]  (Abnormal) Collected:  08/11/17 1728    Specimen:  Blood Updated:  08/11/17 1813     Valproic Acid 40.0 (L) mcg/mL     Procalcitonin [[id number]]  (Abnormal) Collected:  08/11/17 1728    Specimen:  Blood Updated:  08/11/17 1817     Procalcitonin 0.08 (L) ng/mL     Narrative:       As a Marker for Sepsis (Non-Neonates):   1. <0.5 ng/mL represents a low risk of severe sepsis and/or septic shock.  1. >2 ng/mL represents a high risk of severe sepsis and/or septic shock.    As a Marker for Lower Respiratory Tract Infections that require antibiotic therapy:  PCT on Admission     Antibiotic Therapy             6-12 Hrs later  > 0.5                Strongly Recommended            >0.25 - <0.5         Recommended  0.1 - 0.25           Discouraged                   Remeasure/reassess PCT  <0.1                 Strongly Discouraged          Remeasure/reassess PCT      As 28 day mortality risk marker: \"Change in Procalcitonin Result\" (> 80 % or <=80 %) if Day 0 (or Day 1) and Day 4 values are available. Refer to http://www.[facility]-pct-calculator.com/   Change in PCT <=80 % "   A decrease of PCT levels below or equal to 80 % defines a positive change in PCT test result representing a higher risk for 28-day all-cause mortality of patients diagnosed with severe sepsis or septic shock.  Change in PCT > 80 %   A decrease of PCT levels of more than 80 % defines a negative change in PCT result representing a lower risk for 28-day all-cause mortality of patients diagnosed with severe sepsis or septic shock.                BNP [[id number]]  (Normal) Collected:  08/11/17 1728    Specimen:  Blood Updated:  08/11/17 1817     proBNP 120.9 pg/mL     Narrative:       Among patients with dyspnea, NT-proBNP is highly sensitive for the detection of acute congestive heart failure. In addition NT-proBNP of <300 pg/ml effectively rules out acute congestive heart failure with 99% negative predictive value.    Troponin [[id number]]  (Normal) Collected:  08/11/17 1728    Specimen:  Blood Updated:  08/11/17 1817     Troponin T <0.010 ng/mL     Narrative:       Troponin T Reference Ranges:  Less than 0.03 ng/mL:    Negative for AMI  0.03 to 0.09 ng/mL:      Indeterminant for AMI  Greater than 0.09 ng/mL: Positive for AMI    CBC & Differential [[id number]] Collected:  08/11/17 1728    Specimen:  Blood Updated:  08/11/17 1824    Narrative:       The following orders were created for panel order CBC & Differential.  Procedure                               Abnormality         Status                     ---------                               -----------         ------                     Manual Differential[[id number]]          Abnormal            Final result               Scan Slide[[id number]]                                       Final result               CBC Auto Differential[[id number]]        Abnormal            Final result                 Please view results for these tests on the individual orders.    CBC Auto Differential [[id number]]  (Abnormal) Collected:  08/11/17 1728    Specimen:  Blood Updated:  08/11/17  1824     WBC 9.88 10*3/mm3       WBC corrected for presence of NRBC's        RBC 3.29 (L) 10*6/mm3      Hemoglobin 8.9 (L) g/dL      Hematocrit 29.7 (L) %      MCV 90.3 fL      MCH 27.1 pg      MCHC 30.0 (L) g/dL      RDW 19.9 (H) %      RDW-SD 65.3 (H) fl      MPV 10.6 fL      Platelets 359 10*3/mm3     Scan Slide [[id number]] Collected:  08/11/17 1728    Specimen:  Blood Updated:  08/11/17 1824     Scan Slide --      See Manual Differential Results       Manual Differential [[id number]]  (Abnormal) Collected:  08/11/17 1728    Specimen:  Blood Updated:  08/11/17 1824     Neutrophil % 70.0 %      Lymphocyte % 21.0 %      Monocyte % 8.0 %      Eosinophil % 1.0 %      Neutrophils Absolute 6.92 10*3/mm3      Lymphocytes Absolute 2.08 10*3/mm3      Monocytes Absolute 0.79 10*3/mm3      Eosinophils Absolute 0.10 10*3/mm3      nRBC 3.0 (H) /100 WBC      Hypochromia Mod/2+     Polychromasia Slight/1+     WBC Morphology Normal     Platelet Morphology Normal    Comprehensive Metabolic Panel [[id number]]  (Abnormal) Collected:  08/11/17 1728    Specimen:  Blood Updated:  08/11/17 1824     Glucose 97 mg/dL      BUN 27 (H) mg/dL      Creatinine 0.63 mg/dL      Sodium 144 mmol/L      Potassium 3.4 (L) mmol/L      Chloride 98 mmol/L      CO2 29.9 (H) mmol/L      Calcium 8.9 mg/dL      Total Protein 7.9 g/dL      Albumin 2.90 (L) g/dL      ALT (SGPT) 5 U/L      AST (SGOT) 18 U/L      Alkaline Phosphatase 198 (H) U/L      Total Bilirubin 0.2 mg/dL      eGFR Non African Amer 96 mL/min/1.73      Globulin 5.0 gm/dL      A/G Ratio 0.6 g/dL      BUN/Creatinine Ratio 42.9 (H)     Anion Gap 16.1 mmol/L     Urinalysis With / Culture If Indicated [[id number]]  (Abnormal) Collected:  08/11/17 1728    Specimen:  Urine from Urine, Catheter Updated:  08/11/17 1829     Color, UA Dark Yellow (A)     Appearance, UA Cloudy (A)     pH, UA 5.5     Specific Gravity, UA >=1.030     Glucose, UA Negative     Ketones, UA 40 mg/dL (2+) (A)     Bilirubin, UA  Negative     Blood, UA Negative     Protein, UA 30 mg/dL (1+) (A)     Leuk Esterase, UA Trace (A)     Nitrite, UA Positive (A)     Urobilinogen, UA 1.0 E.U./dL    Blood Culture [[id number]] Collected:  08/11/17 1832    Specimen:  Blood from Arm, Left Updated:  08/11/17 1834    Urinalysis, Microscopic Only [[id number]]  (Abnormal) Collected:  08/11/17 1728    Specimen:  Urine from Urine, Catheter Updated:  08/11/17 1851     RBC, UA None Seen /HPF      WBC, UA 0-2 /HPF      Bacteria, UA None Seen /HPF      Squamous Epithelial Cells, UA 3-6 (A) /HPF      Hyaline Casts, UA None Seen /LPF      Mucus, UA Moderate/2+ (A) /HPF      Methodology Manual Light Microscopy                   Imaging Results (all)     Procedure Component Value Units Date/Time    XR Chest 1 View [[id number]] Collected:  08/11/17 1717     Updated:  08/11/17 1723    Narrative:       EMERGENCY PORTABLE CHEST SINGLE VIEW     HISTORY: Obese 62-year-old female presents to the ER for evaluation of  confusion. HISTORY includes COPD, breast cancer and fever.     COMPARISON: 4/10/2017 and 4/18/2017     FINDINGS:  1. Question new mass left lung inferiorly measuring 29 mm, otherwise  stable negative acute chest.  2. Previous right breast surgery and right axillary dissection.  3. Chronic left perihilar scarring and atelectasis.  4. Limited imaging due to body habitus, low lung volumes and portable  technique.  5. Question of interval development of left proximal humeral metastasis     Recommend CT follow-up for further evaluation of left lower lobe mass,  and proximal left humeral metastasis.     Bone scan follow-up may also be useful..     This report was finalized on 8/11/2017 5:20 PM by Dr. Marin Chery MD.       CT Chest Without Contrast [[id number]] Collected:  08/11/17 1812     Updated:  08/11/17 1839    Addenda:        CORRECTION: due to computer voice recognition error:     HISTORY: 62-YEAR-OLD obese female...     ADDENDUM:     FINDINGS:  4. Left humeral  head demonstrated apparent metastatic disease on plain  film imaging, this area is not well seen on CT but they are abnormal  suggesting metastatic disease.  6. Diffuse sclerotic bony metastasis involving multiple vertebral  bodies.  7. Somewhat subtle sternal fracture only appreciated on the parasagittal  images.  8.     This report was finalized on 8/11/2017 6:36 PM by Dr. Marin Chery MD.     Signed:  08/11/17 1836 by Marin Chery MD    Narrative:       EMERGENCY NONCONTRAST CT CHEST     HISTORY: 8-year-old obese female presenting to the ER for evaluation of  confusion. HISTORY includes COPD breast cancer and fever question mass  left lower lobe on plain film imaging of the chest, CT follow-up was  requested.     COMPARISON: Correlation is made with a chest x-ray of 08/11/2017 earlier  today and 04/18/2017     FINDINGS:  1. Limited imaging due to body habitus, motion artifact and the absence  of IV contrast.  2. Prominent splenomegaly.  3. Previous cholecystectomy, cardiomegaly.  4. Left humeral head demonstrated apparent metastatic disease involving  is area is not well seen on CT, the humeral head and proximal humerus  are abnormal on CT.  5. Acute bilateral interstitial disease, bibasilar atelectasis without  discrete left lung mass.  6. Coarse chronic calcification in the lingula.       Impression:       1. Acute interstitial disease hiatus and edema versus infectious or  inflammatory change.  2. Splenomegaly.  3. No left lung mass nor active airspace.  4. Right middle lobe nodule measuring 6 mm image #34     Recommend referral to [facility].     This report was finalized on 8/11/2017 6:16 PM by Dr. Marin Chery MD.       CT Head Without Contrast [[id number]] Collected:  08/11/17 1903     Updated:  08/11/17 2050    Narrative:       EMERGENCY NONCONTRAST HEAD CT 08/11/2017     CLINICAL HISTORY: Altered mental status with increasing agitation,  confusion and  anemia.     TECHNIQUE: Spiral CT images were obtained from the base of the skull to  the vertex without intravenous contrast and images were reformatted and  submitted in 3 mm thick axial CT section with brain algorithm. Due to  the motion artifact repeat spiral CT images were obtained of the base of  the skull to the vertex without intravenous contrast and these images  were also reformatted and submitted in 3 mm thick axial CT section with  brain algorithm.     COMPARISON: This is correlated to prior noncontrast head CT from [facility] on 04/06/2017.     FINDINGS: Both initial head CT and the repeat head CT images are  degraded by motion artifact that slightly limits evaluation. There is  only minimal low-density in the periventricular white matter consistent  with minimal small vessel disease and the remainder of the brain  parenchyma is normal in attenuation. The ventricles are normal in size  and I see no mass effect and no midline shift and no extra-axial fluid  collections are identified and there is no evidence of acute  intracranial hemorrhage. There is a 14 x 10 mm ovoid lytic area in the  superior left parietal bone along the left side of the vertex of the  skull, some adjacent soft tissue thickening in the scalp and this  finding is new when compared to a prior head CT 4 months ago, on  04/18/2017, suggesting that its a lytic lesion from myeloma or  metastatic disease.       Impression:          1. This exam is motion degraded. This limits evaluation even despite  repeat imaging through the head. There is minimal small vessel disease  in the cerebral white matter. There is a 14 x 10 mm ovoid lucent area in  the left superior parietal bone along the left superior vertex of the  skull with adjacent soft tissue thickening of the scalp and this finding  is new when compared to head CT 4 months ago on 04/06/2017 and the fact  that it is new over the last 4 months suggests that it is either  a  myelomatous or a metastatic skull lesion. Correlate with physical exam  and a contrast-enhanced MRI of the head could be obtained to further  characterize. In reviewing today's chest CT there are multiple lytic and  blastic lesions in the spine that are new when compared to a chest CT  06/26/2016 indicating new osseous metastatic disease to the bony thorax  and the patient has an enlarged spleen that is new when compared to  01/26/2016 suggesting either myelofibrosis or myeloproliferative  disorder and the findings must be correlated clinically. The remainder  of the head CT is unremarkable.     The results were communicated to Dr. Solares in the emergency room by  telephone on 08/11/2017 at 6:20 PM.     This report was finalized on 8/11/2017 8:47 PM by Dr. Angel Siu MD.           Patient Active Problem List   Diagnosis Code   • Atopic rhinitis J30.9   • Anemia D64.9   • Aspiration pneumonia J69.0   • Chronic obstructive pulmonary disease J44.9   • Dysphagia R13.10   • Gastroesophageal reflux disease with esophagitis K21.0   • Heart murmur R01.1   • Hyperglycemia R73.9   • Hyperlipidemia E78.5   • Hypothyroidism E03.9   • Pediculosis capitis B85.0   • Nicotine dependence F17.200   • Osteoporosis M81.0   • Shortness of breath R06.02   • Swallowing impaired R13.10   • Sepsis A41.9   • Other pneumonia, unspecified organism J18.8   • Acute respiratory failure with hypoxia J96.01   • Altered mental status R41.82   • Metabolic encephalopathy G93.41   • Hypoxemia R09.02   • Metastatic disease C80.1   • Breast cancer C50.919   • Diabetes mellitus E11.9   • DNR (do not resuscitate) Z66       Assessment:  Principal Problem:    Altered mental status  Active Problems:    Chronic obstructive pulmonary disease    Gastroesophageal reflux disease with esophagitis    Hyperlipidemia    Hypothyroidism    Other pneumonia, unspecified organism    Acute respiratory failure with hypoxia    Metabolic encephalopathy    Hypoxemia     Metastatic disease    Breast cancer    Diabetes mellitus    DNR (do not resuscitate)      Plan:  The patient's admitted the hospital and given some IV fluids for hydration.  Continue with broad-spectrum IV antibiotics.  Cultures of been obtained.  Patient is DNR.  Will last for pulmonary consultation.  [place] is poor the patient likely has metastatic cancer possibly breast with her past history.    Macho Driver MD  8/11/2017  11:26 PM

## 2017-08-12 NOTE — PROGRESS NOTES
"Pharmacokinetic Consult - Vancomycin Dosing (Follow-up Note)    Bharti Roche is 62 year old female with breast cancer on anastrozole that presented from nursing home with altered mental status s/p Narcan without response. CT imaging consistent with possible metastatic disease in bone and lung. Pulmonology consulted and mentioned potential concern for pneumonia given presentation and possible component of pneumonia on CT chest but will follow-up. Patient initiated empirically on vancomycin and piperacillin/tazobactam.     Pharmacy dosing vancomycin per Dr. Driver's request.   Goal trough: 15-20 mg/L   Lactic acid: within normal limits  Procalcitonin: within normal limits    Relevant clinical data and objective history reviewed:  62 y.o. female 66\" (167.6 cm) 164 lb 1.6 oz (74.4 kg)    Past Medical History:   Diagnosis Date   • Abdominal pain    • Abscess of chest wall    • Acute bronchitis with bronchospasm    • Anemia 02/09/2016   • Asthma    • Breast cancer     Breast cancer, ER/[place] positive   • Chronic bronchitis    • Cryptogenic organizing pneumonia    • Depression    • Diabetes mellitus    • Esophageal reflux    • High blood cholesterol level     reported cholesterol level was high   • Influenza    • Osteoporosis    • Paranoid schizophrenia    • Pneumonia      Creatinine   Date Value Ref Range Status   08/12/2017 0.49 (L) 0.57 - 1.00 mg/dL Final   08/11/2017 0.63 0.57 - 1.00 mg/dL Final     BUN   Date Value Ref Range Status   08/12/2017 26 (H) 8 - 23 mg/dL Final     Estimated Creatinine Clearance: 125.9 mL/min (by C-G formula based on Cr of 0.49).    Lab Results   Component Value Date    WBC 10.90 (H) 08/12/2017     Temp Readings from Last 3 Encounters:   08/12/17 98.4 °F (36.9 °C) (Oral)   04/20/17 98 °F (36.7 °C) (Oral)   01/27/17 98.9 °F (37.2 °C) (Oral)     Baseline culture/source/susceptibility:   8/11 Blood cx- NGTD  8/11 Urine cx- NG     IV Anti-Infectives     Ordered     Dose/Rate Route Frequency Start " Stop    08/12/17 1019  vancomycin 1250 mg/250 mL 0.9% NS IVPB ([facility])     Ordering Provider:  Macho Driver MD    1,250 mg  over 180 Minutes Intravenous Every 12 Hours 08/12/17 1900      08/11/17 2331  vancomycin 2500 mg/500 mL 0.9% NS IVPB ([facility])     Ordering Provider:  Macho Driver MD    2,500 mg  over 250 Minutes Intravenous Once 08/12/17 0100 08/12/17 0834    08/11/17 2328  piperacillin-tazobactam (ZOSYN) 3.375 g in iso-osmotic dextrose 50 ml (premix)     Ordering Provider:  Macho Driver MD    3.375 g  over 4 Hours Intravenous Every 8 Hours 08/12/17 0030      08/11/17 2324  Pharmacy to dose vancomycin     Ordering Provider:  Macho Driver MD     Does not apply Continuous PRN 08/11/17 2324             Assessment/Plan  Patient received loading dose of 2500 mg IV once at this am. Given mg per kg of loading dose and adjustment in time of dose given, will adjust timing and mg per kg of maintenance dose. Adjusted maintenance dose will be 1250 mg (~16.8 mg/kg per dose) IV q12 and schedule for 1900 which is approximately 15 hours following loading dose to allow for appropriate elimination. Rescheduled vancomycin trough for 8/14 prior to 0700 dose. Serum creatinine appeared stable in the last 24 hours. Ordered serum creatinine for am to monitor. Will adjust as necessary. Pharmacy will continue to follow daily while on vancomycin and adjust as needed.     Thank you for the consult. Please call with any questions.    Adriana Russell, PharmD  Clinical Pharmacy Specialist, Infectious Diseases  [phone]

## 2017-08-12 NOTE — CONSULTS
CONSULT NOTE    Patient Identification:  Bharti Roche  62 y.o.  female  1955  [id number]            Requesting physician:  Dr. Macho Driver    Reason for Consultation:   Acute on chornic hypoxic respiratory failure,COPD    CC: decreased awareness    History of Present Illness:  Bharti Roche is a 61yo with underlying COPD who presented to the ED today from NH with altered mental status.  She is reported to have been agitated and confused at the nursing home.  Narcan was given without response.  Upon my interview she will not respond to questions but will localize and respond no a noxious stimuli.  She has been placed on 6l oxygen to keep sats above 88%.  An abg was performed which showed a ph of 7.48/40.5/57.8 on 2L nc.  She is unable to give any reliable history so above information is gained form review of chart and discussion with healthcare providers.    CT of the chest shows evidence of some epmhysematous changes and potentially some component of pneumonia.  CT head shows lesions suspicious for MM.    Review of Systems:  Unable to obtain accurate ROS as patient is unable to answer questions due to patients non verbal      Past Medical History:   Diagnosis Date   • Abdominal pain    • Abscess of chest wall    • Acute bronchitis with bronchospasm    • Anemia 02/09/2016   • Asthma    • Breast cancer     Breast cancer, ER/FL positive   • Chronic bronchitis    • Cryptogenic organizing pneumonia    • Depression    • Diabetes mellitus    • Esophageal reflux    • High blood cholesterol level     reported cholesterol level was high   • Influenza    • Osteoporosis    • Paranoid schizophrenia    • Pneumonia        Past Surgical History:   Procedure Laterality Date   • MASTECTOMY RADICAL Bilateral     Breast cancer   • OTHER SURGICAL HISTORY      breats surgery mastectomy        Prescriptions Prior to Admission   Medication Sig Dispense Refill Last Dose   • alendronate (FOSAMAX) 10 MG tablet TAKE THREE  TABLETS BY MOUTH EVERY WEDNESDAY ON EMPTY STOMACH; MUST SIT UP RIGHT FOR 30 MINUTES 12 tablet 3    • anastrozole (ARIMIDEX) 1 MG tablet TAKE 1 TABLET BY MOUTH DAILY 30 tablet 2 Taking   • aspirin 81 MG chewable tablet CHEW ONE TABLET BY MOUTH DAILY 30 tablet 6 Taking   • atorvastatin (LIPITOR) 40 MG tablet TAKE 1 TABLET BY MOUTH DAILY AS DIRECTED FOR HIGH CHOLESTEROL 90 tablet 1 Taking   • benztropine (COGENTIN) 1 MG tablet Take 1 mg by mouth 2 (Two) Times a Day.   Taking   • Calcium Carbonate 1500 (600 CA) MG tablet TAKE 1 TABLET BY MOUTH DAILY 30 tablet 1 Not Taking   • cholecalciferol (VITAMIN D3) 1000 UNITS tablet TAKE 1 TABLET BY MOUTH DAILY 28 tablet 3    • cloZAPine (CLOZARIL) 100 MG tablet Take 1 tablet by mouth Every Night. 30 tablet 0 Taking   • divalproex (DEPAKOTE) 500 MG EC tablet Take 1 tablet by mouth. Take one tablet by mouth every morning and two tablets by mouth every evening   Taking   • ferrous sulfate 325 (65 FE) MG tablet Take 325 mg by mouth 3 (Three) Times a Day With Meals.      • levothyroxine (SYNTHROID, LEVOTHROID) 112 MCG tablet TAKE ONE TABLET BY MOUTH DAILY 30 tablet 5 Taking   • midodrine (PROAMATINE) 5 MG tablet Take 5 mg by mouth 3 (Three) Times a Day.      • sertraline (ZOLOFT) 100 MG tablet Take  by mouth Daily.   Taking   • albuterol (PROVENTIL HFA;VENTOLIN HFA) 108 (90 BASE) MCG/ACT inhaler Ventolin  (90 Base) MCG/ACT Inhalation Aerosol Solution; Patient Sig: Ventolin  (90 Base) MCG/ACT Inhalation Aerosol Solution INHALE 1 TO 2 PUFFS EVERY 4 TO 6 HOURS AS NEEDED.; 2; 5; 13-Aug-2012; Active   Taking   • budesonide (PULMICORT) 0.25 MG/2ML nebulizer solution Inhale 0.5 mg 2 (Two) Times a Day. 0.5 mg/2 ml      • budesonide-formoterol (SYMBICORT) 160-4.5 MCG/ACT inhaler Symbicort 160-4.5 MCG/ACT Inhalation Aerosol; Patient Sig: Symbicort 160-4.5 MCG/ACT Inhalation Aerosol ; 10; 0; 19-Aug-2014; Active   Taking   • docusate sodium (COLACE) 100 MG capsule TAKE 1 CAPSULE BY  "MOUTH TWICE A DAY AS NEEDED 60 capsule 1 Taking   • nicotine (NICODERM CQ) 21 MG/24HR patch Place 1 patch on the skin every day. Do not smoke with patch, call before running out for decreasing dose 45 patch 0 Not Taking   • nicotine (NICODERM CQ) 21 MG/24HR patch Place 1 patch on the skin Daily. For tobacco cessation 30 patch 1    • omeprazole (priLOSEC) 20 MG capsule Take 20 mg by mouth Daily.      • pantoprazole (PROTONIX) 40 MG EC tablet Take 1 tablet by mouth Daily. 30 tablet 3 Taking   • risperiDONE (RisperDAL) 1 MG tablet Take 2 tablets by mouth nightly.   Taking       No Known Allergies    Social History     Social History   • Marital status: Single     Spouse name: N/A   • Number of children: 2   • Years of education: N/A     Occupational History   • disabled      Social History Main Topics   • Smoking status: Current Every Day Smoker     Packs/day: 2.00     Types: Cigarettes   • Smokeless tobacco: Not on file      Comment: starting smoking again   • Alcohol use No   • Drug use: No   • Sexual activity: No     Other Topics Concern   • Not on file     Social History Narrative       Family History   Problem Relation Age of Onset   • Cancer Mother      non-Hodgkin's lymphoma   • Prostate cancer Father    • Lung cancer Father    • Esophageal cancer Brother        Physical Exam:  /68  Pulse 97  Temp 98 °F (36.7 °C) (Oral)   Resp 16  Ht 66\" (167.6 cm)  Wt 220 lb (99.8 kg)  SpO2 91%  BMI 35.51 kg/m2  Body mass index is 35.51 kg/(m^2).   GENERAL:  Not oriented  HEENT:  Poor dentition no JVD  PULMONARY:    Symmetric air entry, no rhonchi, no wheeze, no crackles, symmetric air entry  CARDIAC:  RRR no MRG, S1 S2  ABD: SNTND BS+  EXT: no c/c/e, pulses symmetric 2+ bilaterally  NEURO:  Lethargic, will respond to noxious stimuli, no tremor, will not respond to commands  SKIN: no lesions, no rash  LYMPH: no cervical LAD    LABS:    Results from last 7 days  Lab Units 08/11/17  1728   WBC 10*3/mm3 9.88 "   HEMOGLOBIN g/dL 8.9*   PLATELETS 10*3/mm3 359     Results from last 7 days  Lab Units 08/11/17  1728   SODIUM mmol/L 144   POTASSIUM mmol/L 3.4*   CHLORIDE mmol/L 98   CO2 mmol/L 29.9*   BUN mg/dL 27*   CREATININE mg/dL 0.63   GLUCOSE mg/dL 97   CALCIUM mg/dL 8.9   Estimated Creatinine Clearance: 113.2 mL/min (by C-G formula based on Cr of 0.63).    Imaging: I personally visualized the images of scans/x-rays performed within last 3 days.  Imaging Results (most recent)     Procedure Component Value Units Date/Time    XR Chest 1 View [[id number]] Collected:  08/11/17 1717     Updated:  08/11/17 1723    Narrative:       EMERGENCY PORTABLE CHEST SINGLE VIEW     HISTORY: Obese 62-year-old female presents to the ER for evaluation of  confusion. HISTORY includes COPD, breast cancer and fever.     COMPARISON: 4/10/2017 and 4/18/2017     FINDINGS:  1. Question new mass left lung inferiorly measuring 29 mm, otherwise  stable negative acute chest.  2. Previous right breast surgery and right axillary dissection.  3. Chronic left perihilar scarring and atelectasis.  4. Limited imaging due to body habitus, low lung volumes and portable  technique.  5. Question of interval development of left proximal humeral metastasis     Recommend CT follow-up for further evaluation of left lower lobe mass,  and proximal left humeral metastasis.     Bone scan follow-up may also be useful..     This report was finalized on 8/11/2017 5:20 PM by Dr. Marin Chery MD.       CT Chest Without Contrast [[id number]] Collected:  08/11/17 1812     Updated:  08/11/17 1839    Addenda:        CORRECTION: due to computer voice recognition error:     HISTORY: 62-YEAR-OLD obese female...     ADDENDUM:     FINDINGS:  4. Left humeral head demonstrated apparent metastatic disease on plain  film imaging, this area is not well seen on CT but they are abnormal  suggesting metastatic disease.  6. Diffuse sclerotic bony metastasis involving multiple vertebral  bodies.   7. Somewhat subtle sternal fracture only appreciated on the parasagittal  images.  8.     This report was finalized on 8/11/2017 6:36 PM by Dr. Marin Chery MD.     Signed:  08/11/17 1836 by Marin Chery MD    Narrative:       EMERGENCY NONCONTRAST CT CHEST     HISTORY: 8-year-old obese female presenting to the ER for evaluation of  confusion. HISTORY includes COPD breast cancer and fever question mass  left lower lobe on plain film imaging of the chest, CT follow-up was  requested.     COMPARISON: Correlation is made with a chest x-ray of 08/11/2017 earlier  today and 04/18/2017     FINDINGS:  1. Limited imaging due to body habitus, motion artifact and the absence  of IV contrast.  2. Prominent splenomegaly.  3. Previous cholecystectomy, cardiomegaly.  4. Left humeral head demonstrated apparent metastatic disease involving  is area is not well seen on CT, the humeral head and proximal humerus  are abnormal on CT.  5. Acute bilateral interstitial disease, bibasilar atelectasis without  discrete left lung mass.  6. Coarse chronic calcification in the lingula.       Impression:       1. Acute interstitial disease hiatus and edema versus infectious or  inflammatory change.  2. Splenomegaly.  3. No left lung mass nor active airspace.  4. Right middle lobe nodule measuring 6 mm image #34     Recommend referral to [facility].     This report was finalized on 8/11/2017 6:16 PM by Dr. Marin Chery MD.       CT Head Without Contrast [[id number]] Collected:  08/11/17 1903     Updated:  08/11/17 2050    Narrative:       EMERGENCY NONCONTRAST HEAD CT 08/11/2017     CLINICAL HISTORY: Altered mental status with increasing agitation,  confusion and anemia.     TECHNIQUE: Spiral CT images were obtained from the base of the skull to  the vertex without intravenous contrast and images were reformatted and  submitted in 3 mm thick axial CT section with brain algorithm. Due to  the motion  artifact repeat spiral CT images were obtained of the base of  the skull to the vertex without intravenous contrast and these images  were also reformatted and submitted in 3 mm thick axial CT section with  brain algorithm.     COMPARISON: This is correlated to prior noncontrast head CT from [facility] on 04/06/2017.     FINDINGS: Both initial head CT and the repeat head CT images are  degraded by motion artifact that slightly limits evaluation. There is  only minimal low-density in the periventricular white matter consistent  with minimal small vessel disease and the remainder of the brain  parenchyma is normal in attenuation. The ventricles are normal in size  and I see no mass effect and no midline shift and no extra-axial fluid  collections are identified and there is no evidence of acute  intracranial hemorrhage. There is a 14 x 10 mm ovoid lytic area in the  superior left parietal bone along the left side of the vertex of the  skull, some adjacent soft tissue thickening in the scalp and this  finding is new when compared to a prior head CT 4 months ago, on  04/18/2017, suggesting that its a lytic lesion from myeloma or  metastatic disease.       Impression:          1. This exam is motion degraded. This limits evaluation even despite  repeat imaging through the head. There is minimal small vessel disease  in the cerebral white matter. There is a 14 x 10 mm ovoid lucent area in  the left superior parietal bone along the left superior vertex of the  skull with adjacent soft tissue thickening of the scalp and this finding  is new when compared to head CT 4 months ago on 04/06/2017 and the fact  that it is new over the last 4 months suggests that it is either a  myelomatous or a metastatic skull lesion. Correlate with physical exam  and a contrast-enhanced MRI of the head could be obtained to further  characterize. In reviewing today's chest CT there are multiple lytic and  blastic lesions in  the spine that are new when compared to a chest CT  06/26/2016 indicating new osseous metastatic disease to the bony thorax  and the patient has an enlarged spleen that is new when compared to  01/26/2016 suggesting either myelofibrosis or myeloproliferative  disorder and the findings must be correlated clinically. The remainder  of the head CT is unremarkable.     The results were communicated to Dr. Solares in the emergency room by  telephone on 08/11/2017 at 6:20 PM.     This report was finalized on 8/11/2017 8:47 PM by Dr. Angel Siu MD.             Assessment / Recommendations:  COPD  Acute on Chronic Hypoxic Respiratory Failure  TME  Bony Lesions concerning for underlying malignancy    Patient Active Problem List   Diagnosis   • Atopic rhinitis   • Anemia   • Aspiration pneumonia   • Chronic obstructive pulmonary disease   • Dysphagia   • Gastroesophageal reflux disease with esophagitis   • Heart murmur   • Hyperglycemia   • Hyperlipidemia   • Hypothyroidism   • Pediculosis capitis   • Nicotine dependence   • Osteoporosis   • Shortness of breath   • Swallowing impaired   • Sepsis   • Other pneumonia, unspecified organism   • Acute respiratory failure with hypoxia   • Altered mental status   • Metabolic encephalopathy   • Hypoxemia   • Metastatic disease   • Breast cancer   • Diabetes mellitus   • DNR (do not resuscitate)         -will add brovana to her pulmicort neb as I dont believe she will be able to coordinate a symbicort mdi  -scheduled duonebs  -supplemental oxygen to keep sats above 88%  -no respiratory acidosis on abg  -abx reasonable at this point despite procal given clinical situation, but will need to re-evaluate on continued basis      -send tox screen, SPEP UPEP  TSH- ordered  -lactate, procal, ammonia wnl  -consideration of neurology consultation    Thanks for allowing us to participate in the care of this patient.  [facility]PC is always available to discuss any concerns, questions or to help  coordinate the patient's care.  Patient seen by [facility] night coverage. Care will be assumed by another physician in the group in the morning.          Steve Chance MD  [facility]

## 2017-08-12 NOTE — PROGRESS NOTES
" LOS: 1 day     Name: Bharti Roche  Age: 62 y.o.  Sex: female  :  1955  MRN: [id number]         Primary Care Physician: Catracho Bell MD    Subjective   Subjective  Remains confused    Objective   Vital Signs  Temp:  [98 °F (36.7 °C)-99.9 °F (37.7 °C)] 98.4 °F (36.9 °C)  Heart Rate:  [] 95  Resp:  [14-20] 16  BP: ()/(55-70) 114/66  Body mass index is 26.49 kg/(m^2).    Objective:  General Appearance:  Comfortable and in no acute distress (Appears much older than stated age, chronically ill-appearing).    Vital signs: (most recent): Blood pressure 114/66, pulse 95, temperature 98.4 °F (36.9 °C), temperature source Oral, resp. rate 16, height 66\" (167.6 cm), weight 164 lb 1.6 oz (74.4 kg), SpO2 (!) 88 %, not currently breastfeeding.    Lungs:  Normal respiratory rate and normal effort.    Heart: Normal rate.  Regular rhythm.    Abdomen: Abdomen is soft.  Bowel sounds are normal.   There is no abdominal tenderness.     Extremities: There is no local swelling or dependent edema.    Neurological: Patient is alert.  (She is alert but confused and does not follow commands).    Skin:  Warm and dry.              Results Review:       I reviewed the patient's new clinical results.      Results from last 7 days  Lab Units 17  0537 17  1728   WBC 10*3/mm3 10.90* 9.88   HEMOGLOBIN g/dL 7.6* 8.9*   PLATELETS 10*3/mm3 353 359       Results from last 7 days  Lab Units 17  0537 17  1728   SODIUM mmol/L 146* 144   POTASSIUM mmol/L 3.5 3.4*   CHLORIDE mmol/L 102 98   CO2 mmol/L 31.5* 29.9*   BUN mg/dL 26* 27*   CREATININE mg/dL 0.49* 0.63   CALCIUM mg/dL 8.6 8.9   GLUCOSE mg/dL 85 97         Scheduled Meds:     anastrozole 1 mg Oral Daily   arformoterol 15 mcg Nebulization BID - RT   aspirin 81 mg Oral Daily   atorvastatin 40 mg Oral Daily   benztropine 1 mg Oral BID   budesonide 0.5 mg Nebulization BID   enoxaparin 30 mg Subcutaneous Daily   ipratropium-albuterol 3 mL Nebulization 4x " Daily - RT   levothyroxine 112 mcg Oral Q AM   midodrine 5 mg Oral TID   pantoprazole 40 mg Oral QAM   piperacillin-tazobactam 3.375 g Intravenous Q8H   sertraline 100 mg Oral Daily   vancomycin 1,250 mg Intravenous Q12H     PRN Meds:   •  acetaminophen  •  acetaminophen  •  Pharmacy to dose vancomycin  •  sodium chloride  •  Insert peripheral IV **AND** sodium chloride  Continuous Infusions:    dextrose 5 % and sodium chloride 0.45 % 100 mL/hr   Pharmacy to dose vancomycin        Assessment/Plan   Principal Problem:    Altered mental status  Active Problems:    Chronic obstructive pulmonary disease    Gastroesophageal reflux disease with esophagitis    Hyperlipidemia    Hypothyroidism    Other pneumonia, unspecified organism    Acute respiratory failure with hypoxia    Metabolic encephalopathy    Hypoxemia    Metastatic disease    Breast cancer    Diabetes mellitus    DNR (do not resuscitate)      Assessment & Plan    - Continue antibiotics for possible pneumonia.  Pulmonology following for acute on chronic hypoxic respiratory failure and COPD  - CT of the head and chest reviewed.  I'm going to order an MRI of the brain for further evaluation of the possible metastatic skull lesion and other potential etiologies of her confusion.  - We will ask oncology to evaluate her given history of breast cancer with potential for metastatic disease and/or multiple myeloma  - She is currently nothing by mouth until evaluated by speech therapy.  Sodium is slightly elevated and we'll change fluids to D5 half-normal saline.  - Check iron studies    Angel Schofield MD  [facility]  08/12/17  10:50 AM

## 2017-08-12 NOTE — PROGRESS NOTES
PROGRESS NOTE   LOS: 1 day   Patient Care Team:  Catracho Bell MD as PCP - General (Pulmonary Disease)    Chief Complaint:altered mental status, f, history of breast cancer with possible metastatic disease    Interval History: Patient was admitted on 8/11/17 with altered mental status.  Per family she had been having frequent falls due to low blood pressure and hypoxemia with O2 sats in the 80s.  Chest x-ray showed possible left lower lobe lung mass and bilateral perihilar atelectasis.  CT chest  showed acute interstitial changes suggestive of edema versus inflammatory or infectious change.  She was noted to have a right upper lobe 6 mm nodule.  There was also an area on left humeral head suggestive of metastatic disease on plain film, but was not well visualized on CT scan.  She also had a CT of the head that showed a lesion on the left parietal bone suggestive of myeloma that is or metastatic lesion since it is new from 4/6/17.  Pro-calcitonin and white blood cell count were within normal limits, that antibiotics were started and pared gently due to patient's underlying condition.  Urine tox screen was negative     REVIEW OF SYSTEMS:   CARDIOVASCULAR: No chest pain, chest pressure or chest discomfort. No palpitations or edema.   RESPIRATORY:  shortness of breath.  Cough.  No sputum production  Gastrointestinal: No anorexia, nausea, vomiting or diarrhea. No abdominal pain or blood.   HEMATOLOGIC: No bleeding or bruising.     Ventilator/Non-Invasive Ventilation Settings     None          Vital Signs  Temp:  [98 °F (36.7 °C)-99.9 °F (37.7 °C)] 98.4 °F (36.9 °C)  Heart Rate:  [] 95  Resp:  [14-20] 16  BP: ()/(55-70) 114/66  SpO2:  [83 %-95 %] 88 %  on  Flow (L/min):  [3-6] 4 O2 Device: nasal cannula    Intake/Output Summary (Last 24 hours) at 08/12/17 1300  Last data filed at 08/12/17 1103   Gross per 24 hour   Intake             2972 ml   Output                0 ml   Net             2972 ml  "    Flowsheet Rows         First Filed Value    Admission Height  66\" (167.6 cm) Documented at 08/11/2017 1642    Admission Weight  220 lb (99.8 kg) Documented at 08/11/2017 1642        Last 3 weights    08/11/17  1642 08/12/17  0602   Weight: 220 lb (99.8 kg) 164 lb 1.6 oz (74.4 kg)       Physical Exam:  GEN:  No acute distress, Awake, cooperative, Oriented ×1, able to follow commands,well developed, on oxygen per nasal cannula    EYES:   Sclera clear. No icterus. PERRL.   ENT:   External ears/nose normal, no oral lesions, no thrush, mucous membranes moist  NECK:  Supple, midline trachea, no JVD  LUNGS: Normal chest on inspection, diminished breath sounds worse in the bases , no wheezes. No rhonchi.Positive crackles posteriorly bilaterally over the bases. Respirations regular, even and unlabored.   CV:  Regular rhythm and rate. Normal S1/S2. No murmurs, gallops, or rubs noted.  ABD:  Soft, non-tender and non-distended. Normal bowel sounds. No guarding  EXT:  Moves all extremities well. No cyanosis. No redness. No edema.   Skin: dry, intact, no bleeding    Results Review:        Results from last 7 days  Lab Units 08/12/17  0537 08/11/17  1728   SODIUM mmol/L 146* 144   POTASSIUM mmol/L 3.5 3.4*   CHLORIDE mmol/L 102 98   CO2 mmol/L 31.5* 29.9*   BUN mg/dL 26* 27*   CREATININE mg/dL 0.49* 0.63   CALCIUM mg/dL 8.6 8.9   AST (SGOT) U/L  --  18   ALT (SGPT) U/L  --  5   ANION GAP mmol/L 12.5 16.1   ALBUMIN g/dL  --  2.90*       Results from last 7 days  Lab Units 08/11/17  1728   TROPONIN T ng/mL <0.010       Results from last 7 days  Lab Units 08/12/17  0537 08/11/17  1728   WBC 10*3/mm3 10.90* 9.88   HEMOGLOBIN g/dL 7.6* 8.9*   HEMATOCRIT % 27.4* 29.7*   PLATELETS 10*3/mm3 353 359   NEUTROPHIL % % 55.2  --    LYMPHOCYTE % % 25.4  --    MONOCYTES % % 15.3*  --    EOSINOPHIL % % 1.4  --    BASOPHIL % % 0.5  --    IMM GRAN % % 2.2*  --                    Results from last 7 days  Lab Units 08/11/17  2330 08/11/17  1721 "   PH, ARTERIAL pH units 7.468* 7.480*   PO2 ART mm Hg 59.2* 57.8*   PCO2, ARTERIAL mm Hg 48.7* 40.5   HCO3 ART mmol/L 35.3* 30.1*         No results found for: POCGLU    Results from last 7 days  Lab Units 08/11/17  1728   PROCALCITONIN ng/mL 0.08*   LACTATE mmol/L 1.0       Results from last 7 days  Lab Units 08/11/17  1728   PROBNP pg/mL 120.9       Results from last 7 days  Lab Units 08/11/17  1832 08/11/17  1728   BLOODCX  No growth at less than 24 hours No growth at less than 24 hours   URINECX   --  No growth       Results from last 7 days  Lab Units 08/11/17  1728   NITRITE UA  Positive*   WBC UA /HPF 0-2   BACTERIA UA /HPF None Seen   SQUAM EPITHEL UA /HPF 3-6*   URINECX  No growth               Imaging Results (all)     Procedure Component Value Units Date/Time    XR Chest 1 View [[id number]] Collected:  08/11/17 1717     Updated:  08/11/17 1723    Narrative:       EMERGENCY PORTABLE CHEST SINGLE VIEW     HISTORY: Obese 62-year-old female presents to the ER for evaluation of  confusion. HISTORY includes COPD, breast cancer and fever.     COMPARISON: 4/10/2017 and 4/18/2017     FINDINGS:  1. Question new mass left lung inferiorly measuring 29 mm, otherwise  stable negative acute chest.  2. Previous right breast surgery and right axillary dissection.  3. Chronic left perihilar scarring and atelectasis.  4. Limited imaging due to body habitus, low lung volumes and portable  technique.  5. Question of interval development of left proximal humeral metastasis     Recommend CT follow-up for further evaluation of left lower lobe mass,  and proximal left humeral metastasis.     Bone scan follow-up may also be useful..     This report was finalized on 8/11/2017 5:20 PM by Dr. Marin Chery MD.       CT Chest Without Contrast [[id number]] Collected:  08/11/17 1812     Updated:  08/11/17 1839    Addenda:        CORRECTION: due to computer voice recognition error:     HISTORY: 62-YEAR-OLD obese female...     ADDENDUM:      FINDINGS:  4. Left humeral head demonstrated apparent metastatic disease on plain  film imaging, this area is not well seen on CT but they are abnormal  suggesting metastatic disease.  6. Diffuse sclerotic bony metastasis involving multiple vertebral  bodies.  7. Somewhat subtle sternal fracture only appreciated on the parasagittal  images.  8.     This report was finalized on 8/11/2017 6:36 PM by Dr. Marin Chery MD.     Signed:  08/11/17 1836 by Marin Chery MD    Narrative:       EMERGENCY NONCONTRAST CT CHEST     HISTORY: 8-year-old obese female presenting to the ER for evaluation of  confusion. HISTORY includes COPD breast cancer and fever question mass  left lower lobe on plain film imaging of the chest, CT follow-up was  requested.     COMPARISON: Correlation is made with a chest x-ray of 08/11/2017 earlier  today and 04/18/2017     FINDINGS:  1. Limited imaging due to body habitus, motion artifact and the absence  of IV contrast.  2. Prominent splenomegaly.  3. Previous cholecystectomy, cardiomegaly.  4. Left humeral head demonstrated apparent metastatic disease involving  is area is not well seen on CT, the humeral head and proximal humerus  are abnormal on CT.  5. Acute bilateral interstitial disease, bibasilar atelectasis without  discrete left lung mass.  6. Coarse chronic calcification in the lingula.       Impression:       1. Acute interstitial disease hiatus and edema versus infectious or  inflammatory change.  2. Splenomegaly.  3. No left lung mass nor active airspace.  4. Right middle lobe nodule measuring 6 mm image #34     Recommend referral to [facility].     This report was finalized on 8/11/2017 6:16 PM by Dr. Marin Chery MD.       CT Head Without Contrast [[id number]] Collected:  08/11/17 1903     Updated:  08/11/17 2050    Narrative:       EMERGENCY NONCONTRAST HEAD CT 08/11/2017     CLINICAL HISTORY: Altered mental status with increasing  agitation,  confusion and anemia.     TECHNIQUE: Spiral CT images were obtained from the base of the skull to  the vertex without intravenous contrast and images were reformatted and  submitted in 3 mm thick axial CT section with brain algorithm. Due to  the motion artifact repeat spiral CT images were obtained of the base of  the skull to the vertex without intravenous contrast and these images  were also reformatted and submitted in 3 mm thick axial CT section with  brain algorithm.     COMPARISON: This is correlated to prior noncontrast head CT from [facility] on 04/06/2017.     FINDINGS: Both initial head CT and the repeat head CT images are  degraded by motion artifact that slightly limits evaluation. There is  only minimal low-density in the periventricular white matter consistent  with minimal small vessel disease and the remainder of the brain  parenchyma is normal in attenuation. The ventricles are normal in size  and I see no mass effect and no midline shift and no extra-axial fluid  collections are identified and there is no evidence of acute  intracranial hemorrhage. There is a 14 x 10 mm ovoid lytic area in the  superior left parietal bone along the left side of the vertex of the  skull, some adjacent soft tissue thickening in the scalp and this  finding is new when compared to a prior head CT 4 months ago, on  04/18/2017, suggesting that its a lytic lesion from myeloma or  metastatic disease.       Impression:          1. This exam is motion degraded. This limits evaluation even despite  repeat imaging through the head. There is minimal small vessel disease  in the cerebral white matter. There is a 14 x 10 mm ovoid lucent area in  the left superior parietal bone along the left superior vertex of the  skull with adjacent soft tissue thickening of the scalp and this finding  is new when compared to head CT 4 months ago on 04/06/2017 and the fact  that it is new over the last 4 months  suggests that it is either a  myelomatous or a metastatic skull lesion. Correlate with physical exam  and a contrast-enhanced MRI of the head could be obtained to further  characterize. In reviewing today's chest CT there are multiple lytic and  blastic lesions in the spine that are new when compared to a chest CT  06/26/2016 indicating new osseous metastatic disease to the bony thorax  and the patient has an enlarged spleen that is new when compared to  01/26/2016 suggesting either myelofibrosis or myeloproliferative  disorder and the findings must be correlated clinically. The remainder  of the head CT is unremarkable.     The results were communicated to Dr. Solares in the emergency room by  telephone on 08/11/2017 at 6:20 PM.     This report was finalized on 8/11/2017 8:47 PM by Dr. Angel Siu MD.             I reviewed the patient's new clinical results.  I personally viewed and interpreted the patient's imaging results:        Medication Review:     arformoterol 15 mcg Nebulization BID - RT   aspirin 81 mg Oral Daily   atorvastatin 40 mg Oral Daily   benztropine 1 mg Oral BID   budesonide 0.5 mg Nebulization BID   enoxaparin 30 mg Subcutaneous Daily   furosemide 20 mg Intravenous Once   ipratropium-albuterol 3 mL Nebulization 4x Daily - RT   letrozole 2.5 mg Oral Daily   levothyroxine 112 mcg Oral Q AM   midodrine 5 mg Oral TID   pantoprazole 40 mg Oral QAM   piperacillin-tazobactam 3.375 g Intravenous Q8H   sertraline 100 mg Oral Daily   vancomycin 1,250 mg Intravenous Q12H         dextrose 5 % and sodium chloride 0.45 % 100 mL/hr   Pharmacy to dose vancomycin        ASSESSMENT:   1. Altered mental status with TME   2. Acute on chronic hypoxemic respiratory failure  3. COPD  4. Interstitial changes on CT suggestive of edema versus infection/inflammation.  5. Bony lesions concerning for underlying malignancy  6. History of right breast cancer status post surgery with lymph node  removal  7. Anemia  8. Hypokalemia-improved  9. Mild hypernatremia  10. Diabetes mellitus  11. Abnormal urinary analysis with negative urine culture  12. History of MRSA    PLAN:  continue Zosyn and vancomycin for now with history of MRSA and possible underlying pneumonia.  Obtain sputum culture And de-escalate or modify antibiotic regimen accordingly  Check MRSA screen and if negative stop vancomycin   Continue brovana and Pulmicort as substitute for Symbicort  Encourage pulmonary hygiene measures with TCDB and bronchodilators  Continue oxygen and wean to keep sats greater than 88%  And await oncology evaluation   Awaiting speech evaluation to decide on diet and is currently nothing by mouth   Will give 1 dose of IV Lasix 20 mg due to possible volume overload with hydrostatic edema  Strict I's and O's    Discussed with the family at the bedside    Disposition:  per admitting.      Adelina Andres MD  08/12/17  1:00 PM      EMR Dragon/Transcription disclaimer:   Much of this encounter note is an electronic transcription/translation of spoken language to printed text. The electronic translation of spoken language may permit erroneous, or at times, nonsensical words or phrases to be inadvertently transcribed; Although I have reviewed the note for such errors, some may still exist.

## 2017-08-12 NOTE — CONSULTS
[facility] INITIAL INPATIENT CONSULTATION NOTE    REASON FOR CONSULTATION:  Potential metastatic breast carcinoma    HISTORY OF PRESENT ILLNESS:  Bharti Roche is a 62 y.o. female who we are asked to see today in consultation for potential metastatic breast cancer.      The patient 62-year-old female transferred from a nursing facility with a history of chronic obstructive pulmonary disease, diabetes, breast cancer, apparent schizophrenia and previous pneumonia.  She is brought as result of progressive confusion but clearly short of breath and with reduced oxygen saturation.  She has advanced directive including a DNR.  Subsequent scans include a chest x-ray showing a potential mass in the left lung, question of interval development of left proximal humeral metastasis.  CT of the brain demonstrates minimal small vessel disease, 14 x 10 mm lucent area in the left superior parietal bone along the left superior vertex of the skull which is new compared to a scan 4 months ago and could well be a metastatic skull lesion.  A CT of chest shows evidence of splenomegaly though no lung mass except for right middle lobe nodule.  His acute interstitial disease and edema.  Further left femoral head demonstrates apparent metastatic disease and there is diffuse sclerotic bony metastasis involving multiple vertebral bodies and a subtle sternal fracture.  MRI of the brain is currently pending.        A further review the record it does find evidence of a consultation done April 2011 when the patient was found to have pleomorphic calcifications involving the lower inner quadrant of the left breast.  Stereotactic biopsy revealed microcalcifications, invasive ductal carcinoma, histologic grade 2 with no angiolymphatic invasion.  There was additional extensive DCIS with ER 90% VT at 70% HER-2 1+.  Additionally the DCIS was assessed with ER of 90%, VT 5-10%.  Surgically was recommended that she undergo bilateral total  mastectomy as well as a recommendation for her to undergo tamoxifen therapy.  There was a delay as result of pneumonia She evidently did proceed to surgery June 08, 2011 with postoperative complications with MRSA with follow-up note nearly 5 months later indicating right breast cancer with 1.3 cm invasive ductal carcinoma and 4 lymph nodes positive, ER/OH positive, HER-2 negative and left breast cancer invasive ductal carcinoma 1 cm, grade 2, ER/OH positive HER-2 negative with 1 of 2 lymph nodes positive.    When seen back September 21, 2011 plans were made for her to start Arimidex.  She evidently continue this medication with additional hospitalization in January 2014 when she had post influenza pneumonia.  She is reviewed August 2014 with negative scans.  She had additional issues with further recurrent aspiration pneumonia in December 2014 and again August 2015 at which time DO NOT RESUSCITATE order was placed.  She had a similar episode January 2016 and was admitted April 6 to April 20, 2017 with repeat respiratory failure from influenza and additional complications with Escherichia coli urinary tract infection, and acute metabolic encephalopathy.    Past Medical   Past Medical History:   Diagnosis Date   • Abdominal pain    • Abscess of chest wall    • Acute bronchitis with bronchospasm    • Anemia 02/09/2016   • Asthma    • Breast cancer     Breast cancer, ER/OH positive   • Chronic bronchitis    • Cryptogenic organizing pneumonia    • Depression    • Diabetes mellitus    • Esophageal reflux    • High blood cholesterol level     reported cholesterol level was high   • Influenza    • Osteoporosis    • Paranoid schizophrenia    • Pneumonia      Social History   Social History     Social History   • Marital status: Single     Spouse name: N/A   • Number of children: 2   • Years of education: N/A     Occupational History   • disabled      Social History Main Topics   • Smoking status: Current Every Day Smoker      Packs/day: 2.00     Types: Cigarettes   • Smokeless tobacco: Not on file      Comment: starting smoking again   • Alcohol use No   • Drug use: No   • Sexual activity: No     Other Topics Concern   • Not on file     Social History Narrative     Family History  Family History   Problem Relation Age of Onset   • Cancer Mother      non-Hodgkin's lymphoma   • Prostate cancer Father    • Lung cancer Father    • Esophageal cancer Brother      Medications    Current Facility-Administered Medications:   •  acetaminophen (TYLENOL) suppository 650 mg, 650 mg, Rectal, Q4H PRN, Macho Driver MD  •  acetaminophen (TYLENOL) tablet 650 mg, 650 mg, Oral, Q4H PRN, Macho Driver MD  •  anastrozole (ARIMIDEX) tablet 1 mg, 1 mg, Oral, Daily, Macho Driver MD, Stopped at 08/12/17 0845  •  arformoterol (BROVANA) nebulizer solution 15 mcg, 15 mcg, Nebulization, BID - RT, Steve Chance MD, 15 mcg at 08/12/17 0956  •  aspirin chewable tablet 81 mg, 81 mg, Oral, Daily, Macho Driver MD, Stopped at 08/12/17 0845  •  atorvastatin (LIPITOR) tablet 40 mg, 40 mg, Oral, Daily, Macho Driver MD, Stopped at 08/12/17 0845  •  benztropine (COGENTIN) tablet 1 mg, 1 mg, Oral, BID, Macho Driver MD, Stopped at 08/12/17 0845  •  budesonide (PULMICORT) nebulizer solution 0.5 mg, 0.5 mg, Nebulization, BID, Macho Driver MD, 0.25 mg at 08/12/17 0707  •  dextrose 5 % and sodium chloride 0.45 % infusion, 100 mL/hr, Intravenous, Continuous, Angel Schofield MD  •  enoxaparin (LOVENOX) syringe 30 mg, 30 mg, Subcutaneous, Daily, Macho Driver MD, 30 mg at 08/12/17 0855  •  ipratropium-albuterol (DUO-NEB) nebulizer solution 3 mL, 3 mL, Nebulization, 4x Daily - RT, Steve Chance MD, 3 mL at 08/12/17 0707  •  levothyroxine (SYNTHROID, LEVOTHROID) tablet 112 mcg, 112 mcg, Oral, Q AM, Macho Driver MD, Stopped at 08/12/17 0721  •  midodrine (PROAMATINE) tablet 5 mg, 5 mg, Oral, TID, Macho Driver MD, Stopped at 08/12/17 0850  •  pantoprazole  (PROTONIX) EC tablet 40 mg, 40 mg, Oral, QAM, Macho Driver MD, Stopped at 08/12/17 0721  •  Pharmacy to dose vancomycin, , Does not apply, Continuous PRN, Macho Driver MD  •  piperacillin-tazobactam (ZOSYN) 3.375 g in iso-osmotic dextrose 50 ml (premix), 3.375 g, Intravenous, Q8H, Macho Driver MD, 3.375 g at 08/12/17 0855  •  sertraline (ZOLOFT) tablet 100 mg, 100 mg, Oral, Daily, Macho Driver MD, Stopped at 08/12/17 0850  •  sodium chloride 0.9 % flush 1-10 mL, 1-10 mL, Intravenous, PRN, Macho Driver MD  •  Insert peripheral IV, , , Once **AND** sodium chloride 0.9 % flush 10 mL, 10 mL, Intravenous, PRN, Thien Solares MD  •  vancomycin 1250 mg/250 mL 0.9% NS IVPB ([facility]), 1,250 mg, Intravenous, Q12H, Macho Driver MD  Allergies  No Known Allergies  Review of Systems  Fourteen systems have been reviewed with the patient and are positive per HPI only.     Objective:     Vitals:    08/12/17 0956   BP:    Pulse: 95   Resp: 16   Temp:    SpO2: (!) 88%   General Appearance:  Comfortable and in no acute distress Appears much older than stated age, chronically ill-appearing  Lungs:  Normal respiratory rate and normal effort.    Heart: Normal rate.  Regular rhythm.    Abdomen: Abdomen is soft.  Bowel sounds are normal.   There is no abdominal tenderness.     Extremities: There is no local swelling or dependent edema.    Neurological: Patient is alert.  (She is alert but confused and does not follow commands).    Skin:  Warm and dry.          DIAGNOSTIC DATA:    Results from last 7 days  Lab Units 08/12/17  0537 08/11/17  1728   WBC 10*3/mm3 10.90* 9.88   HEMOGLOBIN g/dL 7.6* 8.9*   HEMATOCRIT % 27.4* 29.7*   PLATELETS 10*3/mm3 353 359   MONOCYTES % %  --  8.0       Results from last 7 days  Lab Units 08/12/17  0537 08/11/17  1728   SODIUM mmol/L 146* 144   POTASSIUM mmol/L 3.5 3.4*   CHLORIDE mmol/L 102 98   CO2 mmol/L 31.5* 29.9*   BUN mg/dL 26* 27*   CREATININE mg/dL 0.49* 0.63   CALCIUM mg/dL 8.6 8.9   BILIRUBIN  mg/dL  --  0.2   ALK PHOS U/L  --  198*   ALT (SGPT) U/L  --  5   AST (SGOT) U/L  --  18   GLUCOSE mg/dL 85 97       IMAGING:  As above    Assessment/Plan   Assessment/Plan:   This is a 62 y.o. female with significant past medical history including paranoid schizophrenia, diabetes mellitus, hyperlipidemia, chronic obstructive pulmonary disease and bilateral breast carcinomas in 2011 with pathology including the right breast cancer with 1.3 cm invasive ductal carcinoma and 4 lymph nodes positive, ER/MA positive, HER-2 negative and left breast cancer invasive ductal carcinoma 1 cm, grade 2, ER/MA positive HER-2 negative with 1 of 2 lymph nodes positive.  As result of her concurrent medical disorders she was not treated with chemotherapy, not a candidate for such therapy, and instead has been treated with anti-hormonal therapy for 6 years using Arimidex.    She is now admitted with mental status and found to have radiologic evidence of diffuse bony metastatic disease.  I have met with the patient and one of her sisters this afternoon and find that the patient is not capable of participating in decisions about her medical care.  She has a POA with another sister who was not present at this time.  Her current relative, however, is well acquainted with the care of her sibling.  We have discussed changing her AI therapy to available Femara while here in the hospital and considering brief course radiation therapy if she indeed develops more pain.  More appropriate, however, is palliative care for this patient and in a consultation will be requested today.  I also plan to contact the hospitalist about discontinuing an MRI of the brain which I do not think the patient could tolerate and the results of which we could not act on.  Thank you we will continue to follow with you during this patient's hospitalization.             Rikki Gallardo MD

## 2017-08-13 NOTE — PROGRESS NOTES
PROGRESS NOTE   LOS: 2 days   Patient Care Team:  Catracho Bell MD as PCP - General (Pulmonary Disease)    Chief Complaint:altered mental status,history of breast cancer with new evidence of bone metastases    Interval History: Patient was admitted on 8/11/17 with altered mental status.  Per family she had been having frequent falls due to low blood pressure and hypoxemia with O2 sats in the 80s.  Chest x-ray showed possible left lower lobe lung mass and bilateral perihilar atelectasis.  CT chest  showed acute interstitial changes suggestive of edema versus inflammatory or infectious change.  She was noted to have a right upper lobe 6 mm nodule.  There was also an area on left humeral head suggestive of metastatic disease on plain film, but was not well visualized on CT scan.  She also had a CT of the head that showed a lesion on the left parietal bone suggestive of myeloma that is or metastatic lesion since it is new from 4/6/17.  Pro-calcitonin and white blood cell count were within normal limits, that antibiotics were started and pared gently due to patient's underlying condition.  Urine tox screen was negative  On August 13, 2017 the patient was seen by oncology and after reviewing the records and discussion with the family the recommendations were to modify her regimen but to consider palliative care at the most appropriate step at this point.  The family seems to be receptive to the idea and a meeting with the path of care team is scheduled for later today.  Meanwhile patient is compensating on nasal cannula oxygen was no sign of distress.     REVIEW OF SYSTEMS:   CARDIOVASCULAR: No chest pain, chest pressure or chest discomfort. No palpitations or edema.   RESPIRATORY:  shortness of breath.  Cough.  No sputum production  Gastrointestinal: No anorexia, nausea, vomiting or diarrhea. No abdominal pain or blood.   HEMATOLOGIC: No bleeding or bruising.     Ventilator/Non-Invasive Ventilation Settings     None  "         Vital Signs  Temp:  [97.2 °F (36.2 °C)-98.8 °F (37.1 °C)] 98.2 °F (36.8 °C)  Heart Rate:  [87-97] 97  Resp:  [16-18] 18  BP: ()/(48-76) 96/48  SpO2:  [88 %-95 %] 88 %  on  Flow (L/min):  [3-4] 3 O2 Device: nasal cannula    Intake/Output Summary (Last 24 hours) at 08/13/17 1201  Last data filed at 08/13/17 0607   Gross per 24 hour   Intake             2112 ml   Output                0 ml   Net             2112 ml     Flowsheet Rows         First Filed Value    Admission Height  66\" (167.6 cm) Documented at 08/11/2017 1642    Admission Weight  220 lb (99.8 kg) Documented at 08/11/2017 1642        Last 3 weights    08/11/17  1642 08/12/17  0602   Weight: 220 lb (99.8 kg) 164 lb 1.6 oz (74.4 kg)       Physical Exam:  GEN:  No acute distress, Awake, cooperative, Oriented ×1, able to follow commands,well developed, on oxygen per nasal cannula    EYES:   Sclera clear. No icterus. PERRL.   ENT:   External ears/nose normal, no oral lesions, no thrush, mucous membranes moist  NECK:  Supple, midline trachea, no JVD  LUNGS: Normal chest on inspection, diminished breath sounds worse in the bases , no wheezes. No rhonchi.Positive crackles posteriorly bilaterally over the bases. Respirations regular, even and unlabored.   CV:  Regular rhythm and rate. Normal S1/S2. No murmurs, gallops, or rubs noted.  ABD:  Soft, non-tender and non-distended. Normal bowel sounds. No guarding  EXT:  Moves all extremities well. No cyanosis. No redness. No edema.   Skin: dry, intact, no bleeding    Results Review:        Results from last 7 days  Lab Units 08/13/17  0438 08/12/17  0537 08/11/17  1728   SODIUM mmol/L 142 146* 144   POTASSIUM mmol/L 2.9* 3.5 3.4*   CHLORIDE mmol/L 100 102 98   CO2 mmol/L 31.3* 31.5* 29.9*   BUN mg/dL 21 26* 27*   CREATININE mg/dL 0.73 0.49* 0.63   CALCIUM mg/dL 8.7 8.6 8.9   AST (SGOT) U/L  --   --  18   ALT (SGPT) U/L  --   --  5   ANION GAP mmol/L 10.7 12.5 16.1   ALBUMIN g/dL  --   --  2.90* "       Results from last 7 days  Lab Units 08/11/17  1728   TROPONIN T ng/mL <0.010       Results from last 7 days  Lab Units 08/13/17  0438 08/12/17  0537 08/11/17  1728   WBC 10*3/mm3 10.33 10.90* 9.88   HEMOGLOBIN g/dL 7.5* 7.6* 8.9*   HEMATOCRIT % 26.8* 27.4* 29.7*   PLATELETS 10*3/mm3 346 353 359   NEUTROPHIL % % 49.9 55.2  --    LYMPHOCYTE % % 26.8 25.4  --    MONOCYTES % % 16.5* 15.3*  --    EOSINOPHIL % % 3.0 1.4  --    BASOPHIL % % 0.9 0.5  --    IMM GRAN % % 2.9* 2.2*  --                    Results from last 7 days  Lab Units 08/11/17  2330 08/11/17  1721   PH, ARTERIAL pH units 7.468* 7.480*   PO2 ART mm Hg 59.2* 57.8*   PCO2, ARTERIAL mm Hg 48.7* 40.5   HCO3 ART mmol/L 35.3* 30.1*         No results found for: POCGLU    Results from last 7 days  Lab Units 08/11/17  1728   PROCALCITONIN ng/mL 0.08*   LACTATE mmol/L 1.0       Results from last 7 days  Lab Units 08/11/17  1728   PROBNP pg/mL 120.9       Results from last 7 days  Lab Units 08/11/17  1832 08/11/17  1728   BLOODCX  No growth at 24 hours No growth at 24 hours   URINECX   --  No growth       Results from last 7 days  Lab Units 08/11/17  1728   NITRITE UA  Positive*   WBC UA /HPF 0-2   BACTERIA UA /HPF None Seen   SQUAM EPITHEL UA /HPF 3-6*   URINECX  No growth               Imaging Results (all)     Procedure Component Value Units Date/Time    XR Chest 1 View [[id number]] Collected:  08/11/17 1717     Updated:  08/11/17 1723    Narrative:       EMERGENCY PORTABLE CHEST SINGLE VIEW     HISTORY: Obese 62-year-old female presents to the ER for evaluation of  confusion. HISTORY includes COPD, breast cancer and fever.     COMPARISON: 4/10/2017 and 4/18/2017     FINDINGS:  1. Question new mass left lung inferiorly measuring 29 mm, otherwise  stable negative acute chest.  2. Previous right breast surgery and right axillary dissection.  3. Chronic left perihilar scarring and atelectasis.  4. Limited imaging due to body habitus, low lung volumes and  portable  technique.  5. Question of interval development of left proximal humeral metastasis     Recommend CT follow-up for further evaluation of left lower lobe mass,  and proximal left humeral metastasis.     Bone scan follow-up may also be useful..     This report was finalized on 8/11/2017 5:20 PM by Dr. Marin Chery MD.       CT Chest Without Contrast [[id number]] Collected:  08/11/17 1812     Updated:  08/11/17 1839    Addenda:        CORRECTION: due to computer voice recognition error:     HISTORY: 62-YEAR-OLD obese female...     ADDENDUM:     FINDINGS:  4. Left humeral head demonstrated apparent metastatic disease on plain  film imaging, this area is not well seen on CT but they are abnormal  suggesting metastatic disease.  6. Diffuse sclerotic bony metastasis involving multiple vertebral  bodies.  7. Somewhat subtle sternal fracture only appreciated on the parasagittal  images.  8.     This report was finalized on 8/11/2017 6:36 PM by Dr. Marin Chery MD.     Signed:  08/11/17 1836 by Marin Chery MD    Narrative:       EMERGENCY NONCONTRAST CT CHEST     HISTORY: 8-year-old obese female presenting to the ER for evaluation of  confusion. HISTORY includes COPD breast cancer and fever question mass  left lower lobe on plain film imaging of the chest, CT follow-up was  requested.     COMPARISON: Correlation is made with a chest x-ray of 08/11/2017 earlier  today and 04/18/2017     FINDINGS:  1. Limited imaging due to body habitus, motion artifact and the absence  of IV contrast.  2. Prominent splenomegaly.  3. Previous cholecystectomy, cardiomegaly.  4. Left humeral head demonstrated apparent metastatic disease involving  is area is not well seen on CT, the humeral head and proximal humerus  are abnormal on CT.  5. Acute bilateral interstitial disease, bibasilar atelectasis without  discrete left lung mass.  6. Coarse chronic calcification in the lingula.       Impression:       1. Acute interstitial  disease hiatus and edema versus infectious or  inflammatory change.  2. Splenomegaly.  3. No left lung mass nor active airspace.  4. Right middle lobe nodule measuring 6 mm image #34     Recommend referral to [facility].     This report was finalized on 8/11/2017 6:16 PM by Dr. Marin Chery MD.       CT Head Without Contrast [[id number]] Collected:  08/11/17 1903     Updated:  08/11/17 2050    Narrative:       EMERGENCY NONCONTRAST HEAD CT 08/11/2017     CLINICAL HISTORY: Altered mental status with increasing agitation,  confusion and anemia.     TECHNIQUE: Spiral CT images were obtained from the base of the skull to  the vertex without intravenous contrast and images were reformatted and  submitted in 3 mm thick axial CT section with brain algorithm. Due to  the motion artifact repeat spiral CT images were obtained of the base of  the skull to the vertex without intravenous contrast and these images  were also reformatted and submitted in 3 mm thick axial CT section with  brain algorithm.     COMPARISON: This is correlated to prior noncontrast head CT from [facility] on 04/06/2017.     FINDINGS: Both initial head CT and the repeat head CT images are  degraded by motion artifact that slightly limits evaluation. There is  only minimal low-density in the periventricular white matter consistent  with minimal small vessel disease and the remainder of the brain  parenchyma is normal in attenuation. The ventricles are normal in size  and I see no mass effect and no midline shift and no extra-axial fluid  collections are identified and there is no evidence of acute  intracranial hemorrhage. There is a 14 x 10 mm ovoid lytic area in the  superior left parietal bone along the left side of the vertex of the  skull, some adjacent soft tissue thickening in the scalp and this  finding is new when compared to a prior head CT 4 months ago, on  04/18/2017, suggesting that  its a lytic lesion from myeloma or  metastatic disease.       Impression:          1. This exam is motion degraded. This limits evaluation even despite  repeat imaging through the head. There is minimal small vessel disease  in the cerebral white matter. There is a 14 x 10 mm ovoid lucent area in  the left superior parietal bone along the left superior vertex of the  skull with adjacent soft tissue thickening of the scalp and this finding  is new when compared to head CT 4 months ago on 04/06/2017 and the fact  that it is new over the last 4 months suggests that it is either a  myelomatous or a metastatic skull lesion. Correlate with physical exam  and a contrast-enhanced MRI of the head could be obtained to further  characterize. In reviewing today's chest CT there are multiple lytic and  blastic lesions in the spine that are new when compared to a chest CT  06/26/2016 indicating new osseous metastatic disease to the bony thorax  and the patient has an enlarged spleen that is new when compared to  01/26/2016 suggesting either myelofibrosis or myeloproliferative  disorder and the findings must be correlated clinically. The remainder  of the head CT is unremarkable.     The results were communicated to Dr. Solares in the emergency room by  telephone on 08/11/2017 at 6:20 PM.     This report was finalized on 8/11/2017 8:47 PM by Dr. Angel Siu MD.                   Medication Review:     arformoterol 15 mcg Nebulization BID - RT   aspirin 81 mg Oral Daily   atorvastatin 40 mg Oral Daily   benztropine 1 mg Oral BID   budesonide 0.5 mg Nebulization BID   enoxaparin 30 mg Subcutaneous Daily   ipratropium-albuterol 3 mL Nebulization 4x Daily - RT   letrozole 2.5 mg Oral Daily   levothyroxine 112 mcg Oral Q AM   midodrine 5 mg Oral TID   pantoprazole 40 mg Oral QAM   piperacillin-tazobactam 3.375 g Intravenous Q8H   sertraline 100 mg Oral Daily   vancomycin 1,250 mg Intravenous Q12H         dextrose 5 % and sodium  chloride 0.45 % 100 mL/hr Last Rate: 100 mL/hr (08/13/17 0600)   Pharmacy to dose vancomycin         ASSESSMENT:   1. Altered mental status with TME   2. Acute on chronic hypoxemic respiratory failure  3. COPD  4. Interstitial changes on CT suggestive of edema versus infection/inflammation.  5. Metastatic disease to the bone most likely from her breast cancer  6. History of right breast cancer status post surgery with lymph node removal  7. Anemia  8. Hypokalemia-improved  9. Mild hypernatremia  10. Diabetes mellitus  11. Abnormal urinary analysis with negative urine culture  12. History of MRSA    PLAN:  Discontinue vancomycin and  Zosyn and switch to oral antibiotic, however consider stopping all antibiotic if the patient and family decided to go for palliative care  Continue brovana and Pulmicort as substitute for Symbicort  Continue oxygen and wean to keep sats greater than 88%    Discussed with nursing staff, no family at the bedside this morning    Disposition:  per admitting.      Adelina Andres MD  08/13/17  12:01 PM      EMR Dragon/Transcription disclaimer:   Much of this encounter note is an electronic transcription/translation of spoken language to printed text. The electronic translation of spoken language may permit erroneous, or at times, nonsensical words or phrases to be inadvertently transcribed; Although I have reviewed the note for such errors, some may still exist.

## 2017-08-13 NOTE — CONSULTS
"Adult Nutrition  Assessment/PES    Patient Name:  Bharti Roche  YOB: 1955  MRN: [id number]  Admit Date:  8/11/2017    Assessment Date:  8/13/2017        Reason for Assessment       08/13/17 1349    Reason for Assessment    Reason For Assessment/Visit identified at risk by screening criteria    Identified At Risk By Screening Criteria MST SCORE 2+    Diagnosis Diagnosis    Oncology Breast cancer;Cancer w/mets    Psychosocial Schizophrenia              Nutrition/Diet History       08/13/17 1352    Nutrition/Diet History    Typical Food/Fluid Intake thickened liquids PTA            Anthropometrics       08/13/17 1350    Anthropometrics    RD Documented Current Weight  74.4 kg (164 lb)    RD Documented Weight on Admission 99.8 kg (220 lb)    Anthropometrics (Special Considerations)    Height Used for Calculations 1.676 m (5' 5.98\")    Usual Body Weight (UBW)    Usual Body Weight 91.2 kg (201 lb)   4/6/17            Labs/Tests/Procedures/Meds       08/13/17 1351    Labs/Tests/Procedures/Meds    Diagnostic Test/Procedure Review reviewed    Labs/Tests Review Reviewed    Medication Review Reviewed, pertinent    Significant Vitals reviewed                Nutrition Prescription Ordered       08/13/17 1352    Nutrition Prescription PO    Current PO Diet NPO            Evaluation of Received Nutrient/Fluid Intake       08/13/17 1352    Evaluation of Received Nutrient/Fluid Intake    Nutrition Delivered Fluid Evaluation    Fluid Intake Evaluation    IV Fluid (mL) 2400    Total Fluid Intake (mL) 2400              Problem/Interventions:        Problem 1       08/13/17 1355    Nutrition Diagnoses Problem 1    Problem 1 Nutrition Appropriate for Condition at this Time    Etiology (related to) MNT for Treatment/Condition    Signs/Symptoms (evidenced by) NPO                    Intervention Goal       08/13/17 1355    Intervention Goal    General Palliative Care            Nutrition Intervention       08/13/17 1356 "    Nutrition Intervention    RD/Tech Action Care plan reviewd            Nutrition Prescription       08/13/17 1356    Other Orders    Obtain Weight Now   Spoke to nurse and asked that patient be reweighed to clarify discrepancy    Obtain Weight Ordered? Yes            Education/Evaluation       08/13/17 1356    Monitor/Evaluation    Monitor Per protocol        Comments:  Asked nursing to reweigh patient. Noted plans for palliative care.     Electronically signed by:  Scarlett Dewey RD  08/13/17 1:57 PM

## 2017-08-13 NOTE — PROGRESS NOTES
[facility] INPATIENT PROGRESS NOTE  Subjective     CC: Metastatic breast carcinoma to bone, palliative care consultation    Interval history:            Bharti Roche is a 62 y.o. female who we are asked to see  in consultation for findings consistent with metastatic breast cancer.      The patient is a  62-year-old female transferred from a nursing facility with a history of chronic obstructive pulmonary disease, diabetes, breast cancer, apparent schizophrenia and previous pneumonia.  She is brought as result of progressive confusion but clearly short of breath and with reduced oxygen saturation.  She has advanced directive including a DNR.  Subsequent scans include a chest x-ray showing a potential mass in the left lung, question of interval development of left proximal humeral metastasis.  CT of the brain demonstrates minimal small vessel disease, 14 x 10 mm lucent area in the left superior parietal bone along the left superior vertex of the skull which is new compared to a scan 4 months ago and could well be a metastatic skull lesion.  A CT of chest shows evidence of splenomegaly though no lung mass except for right middle lobe nodule.  His acute interstitial disease and edema.  Further left femoral head demonstrates apparent metastatic disease and there is diffuse sclerotic bony metastasis involving multiple vertebral bodies and a subtle sternal fracture.  MRI of the brain is currently pending.            A further review the record it does find evidence of a consultation done April 2011 when the patient was found to have pleomorphic calcifications involving the lower inner quadrant of the left breast.  Stereotactic biopsy revealed microcalcifications, invasive ductal carcinoma, histologic grade 2 with no angiolymphatic invasion.  There was additional extensive DCIS with ER 90% ID at 70% HER-2 1+.  Additionally the DCIS was assessed with ER of 90%, ID 5-10%.  Surgically was recommended that she  undergo bilateral total mastectomy as well as a recommendation for her to undergo tamoxifen therapy.  There was a delay as result of pneumonia She evidently did proceed to surgery June 08, 2011 with postoperative complications with MRSA with follow-up note nearly 5 months later indicating right breast cancer with 1.3 cm invasive ductal carcinoma and 4 lymph nodes positive, ER/NM positive, HER-2 negative and left breast cancer invasive ductal carcinoma 1 cm, grade 2, ER/NM positive HER-2 negative with 1 of 2 lymph nodes positive.    When seen back September 21, 2011 plans were made for her to start Arimidex.  She evidently continue this medication with additional hospitalization in January 2014 when she had post influenza pneumonia.  She is reviewed August 2014 with negative scans.  She had additional issues with further recurrent aspiration pneumonia in December 2014 and again August 2015 at which time DO NOT RESUSCITATE order was placed.  She had a similar episode January 2016 and was admitted April 6 to April 20, 2017 with repeat respiratory failure from influenza and additional complications with Escherichia coli urinary tract infection, and acute metabolic encephalopathy.    The patient was seen in consultation August 13, 2017 with radiologic findings of diffuse bony metastatic disease discovered as result of her ongoing assessments.  She is not felt a candidate for additional therapy other than altering her aromatase inhibitor treatment which has occurred.  In discussions with family members palliative care was recommended and ongoing consultation is proceeding.            Medications:  The current medication list was reviewed in the EMR.    Allergies:  No Known Allergies    Objective      Vitals:    08/13/17 0904   BP:    Pulse: 97   Resp: 18   Temp:    SpO2: (!) 88%        Physical exam:   GENERAL:  Well-developed, well-nourished in no acute distress.   SKIN:  Warm, dry without rashes, purpura or  petechiae.  HEAD:  Normocephalic.  EYES:  Pupils equal, round and reactive to light.  EOMs intact.  Conjunctivae normal.  EARS:  Hearing intact.  NOSE:  Septum midline.  No excoriations or nasal discharge.  MOUTH:  Tongue is well-papillated; no stomatitis or ulcers.  Lips normal.  THROAT:  Oropharynx without lesions or exudates.  NECK:  Supple with good range of motion; no thyromegaly or masses, no JVD.  LYMPHATICS:  No cervical, supraclavicular, axillary or inguinal adenopathy.  CHEST:  Lungs clear to percussion and auscultation. Good airflow.  CARDIAC:  Regular rate and rhythm without murmurs, rubs or gallops. Normal S1,S2.  ABDOMEN:  Soft, nontender with no organomegaly or masses.  EXTREMITIES:  No clubbing, cyanosis or edema.  NEUROLOGICAL:  Cranial Nerves II-XII grossly intact.  No focal neurological deficits.  PSYCHIATRIC: Flat affect      RECENT LABS:      Results from last 7 days  Lab Units 08/13/17  0438 08/12/17  0537 08/11/17  1728   WBC 10*3/mm3 10.33 10.90* 9.88   HEMOGLOBIN g/dL 7.5* 7.6* 8.9*   HEMATOCRIT % 26.8* 27.4* 29.7*   PLATELETS 10*3/mm3 346 353 359   MONOCYTES % %  --   --  8.0       Results from last 7 days  Lab Units 08/13/17  0438 08/12/17  0537 08/11/17  1728   SODIUM mmol/L 142 146* 144   POTASSIUM mmol/L 2.9* 3.5 3.4*   CHLORIDE mmol/L 100 102 98   CO2 mmol/L 31.3* 31.5* 29.9*   BUN mg/dL 21 26* 27*   CREATININE mg/dL 0.73 0.49* 0.63   CALCIUM mg/dL 8.7 8.6 8.9   BILIRUBIN mg/dL  --   --  0.2   ALK PHOS U/L  --   --  198*   ALT (SGPT) U/L  --   --  5   AST (SGOT) U/L  --   --  18   GLUCOSE mg/dL 87 85 97           Assessment/Plan          This is a 62 y.o. female with:significant past medical history including paranoid schizophrenia, diabetes mellitus, hyperlipidemia, chronic obstructive pulmonary disease and bilateral breast carcinomas in 2011 with pathology including the right breast cancer with 1.3 cm invasive ductal carcinoma and 4 lymph nodes positive, ER/OK positive, HER-2  negative and left breast cancer invasive ductal carcinoma 1 cm, grade 2, ER/OK positive HER-2 negative with 1 of 2 lymph nodes positive.  As result of her concurrent medical disorders she was not treated with chemotherapy, not a candidate for such therapy, and instead has been treated with anti-hormonal therapy for 6 years using Arimidex.    She is now admitted with mental status and found to have radiologic evidence of diffuse bony metastatic disease.  I have met with the patient and one of her sisters this afternoon and find that the patient is not capable of participating in decisions about her medical care.  She has a POA with another sister who was not present at this time.  Her current relative, however, is well acquainted with the care of her sibling.  We have discussed changing her AI therapy to available Femara while here in the hospital and considering brief course radiation therapy if she indeed develops more pain.  More appropriate, however, is palliative care for this patient and request per palliative care nurse assessment is now ongoing and likely further palliative care consultation.  No additional recommendations at this time.  We'll continue to follow with you during her hospitalization.          Rikki Gallardo MD  8/13/2017  10:20 AM

## 2017-08-13 NOTE — PLAN OF CARE
Problem: Patient Care Overview (Adult)  Goal: Plan of Care Review  Outcome: Ongoing (interventions implemented as appropriate)    08/12/17 9038   Coping/Psychosocial Response Interventions   Plan Of Care Reviewed With patient;daughter;family   Patient Care Overview   Progress no change   Outcome Evaluation   Outcome Summary/Follow up Plan More alert; knows self and family. Oncology spoke with family and discussed cancer disease process (multiple mets to bones). Palliative nurse consult; plan to see POA sister Barrie around 1730 on 8/13/17. Remains on 4L. Upgraded to puree with NTL and **water protocol**. Particularly enjoy water after good oral care between meal. Family seems to be in agreement to proceed with making patient more comfortable through this disease process. Expect a palliative decision within the next couple of days.        Goal: Adult Individualization and Mutuality  Outcome: Ongoing (interventions implemented as appropriate)    Problem: Tissue Perfusion, Ineffective Peripheral (Adult)  Goal: Identify Related Risk Factors and Signs and Symptoms  Outcome: Ongoing (interventions implemented as appropriate)  Goal: Adequate Tissue Perfusion  Outcome: Ongoing (interventions implemented as appropriate)    Problem: Fall Risk (Adult)  Goal: Identify Related Risk Factors and Signs and Symptoms  Outcome: Outcome(s) achieved Date Met:  08/12/17  Goal: Absence of Falls  Outcome: Ongoing (interventions implemented as appropriate)

## 2017-08-13 NOTE — PLAN OF CARE
Problem: Patient Care Overview (Adult)  Goal: Plan of Care Review  Outcome: Ongoing (interventions implemented as appropriate)    08/13/17 4271   Coping/Psychosocial Response Interventions   Plan Of Care Reviewed With patient;family   Patient Care Overview   Progress no change   Outcome Evaluation   Outcome Summary/Follow up Plan replacing k today. d/c iv abx and change to po. palliative meeting today at 1730. pain pill for back pain. resting comfortably with family at side.          Problem: Tissue Perfusion, Ineffective Peripheral (Adult)  Goal: Identify Related Risk Factors and Signs and Symptoms  Outcome: Outcome(s) achieved Date Met:  08/13/17  Goal: Adequate Tissue Perfusion  Outcome: Ongoing (interventions implemented as appropriate)    Problem: Fall Risk (Adult)  Goal: Absence of Falls  Outcome: Ongoing (interventions implemented as appropriate)    Problem: Pain, Acute (Adult)  Goal: Identify Related Risk Factors and Signs and Symptoms  Outcome: Outcome(s) achieved Date Met:  08/13/17  Goal: Acceptable Pain Control/Comfort Level  Outcome: Ongoing (interventions implemented as appropriate)

## 2017-08-13 NOTE — PROGRESS NOTES
Name: Bharti Roche ADMIT: 2017   : 1955  PCP: Catracho Bell MD    MRN: [id number] LOS: 2 days   AGE/SEX: 62 y.o. female  ROOM: [facility]   Subjective   Subjective  CC/reason for follow-up: confusion  No acute events.  Patient remains confused but had moments of greater clarity yesterday.  Speech has seen, recommending puree/thin diet.  Patient has no specific complaints.  Objective   Vital Signs  Temp:  [97.2 °F (36.2 °C)-98.8 °F (37.1 °C)] 98.2 °F (36.8 °C)  Heart Rate:  [87-97] 97  Resp:  [16-18] 18  BP: ()/(48-76) 96/48  SpO2:  [88 %-95 %] 88 %  on  Flow (L/min):  [3-4] 3;   O2 Device: nasal cannula  Body mass index is 26.49 kg/(m^2).    Physical Exam  General:  Alert, no distress, appears chronically ill  Head: NCAT  Eyes: EOMI, conjunctivae normal  Mouth/Throat: oropharynx clear and moist  Neck: supple, nml ROM  Cardiac: normal rate, regular rhythm  Respiratory: normal effort, clear to ascultation  Abdomen: soft, non-tender,bowel sounds normoactive  Musculoskeletal: no deformity, no tenderness, no dependent edema  Extremities: no cyanosis, warm, peripheral pulses intact  Skin: no rashes, warm/dry  Neuro: oriented x1, confused, does not follow commands  Psych: mood and affect appropriate  Results Review:       I reviewed the patient's new clinical results.    Results from last 7 days  Lab Units 17  0438 17  0537 17  1728   WBC 10*3/mm3 10.33 10.90* 9.88   HEMOGLOBIN g/dL 7.5* 7.6* 8.9*   PLATELETS 10*3/mm3 346 353 359     Results from last 7 days  Lab Units 17  0438 17  0537 17  1728   SODIUM mmol/L 142 146* 144   POTASSIUM mmol/L 2.9* 3.5 3.4*   CHLORIDE mmol/L 100 102 98   CO2 mmol/L 31.3* 31.5* 29.9*   BUN mg/dL 21 26* 27*   CREATININE mg/dL 0.73 0.49* 0.63   GLUCOSE mg/dL 87 85 97   Estimated Creatinine Clearance: 84.5 mL/min (by C-G formula based on Cr of 0.73).  Results from last 7 days  Lab Units 17  0438 17  0537 17  1729    CALCIUM mg/dL 8.7 8.6 8.9   ALBUMIN g/dL  --   --  2.90*         arformoterol 15 mcg Nebulization BID - RT   aspirin 81 mg Oral Daily   atorvastatin 40 mg Oral Daily   benztropine 1 mg Oral BID   budesonide 0.5 mg Nebulization BID   enoxaparin 30 mg Subcutaneous Daily   ipratropium-albuterol 3 mL Nebulization 4x Daily - RT   letrozole 2.5 mg Oral Daily   levothyroxine 112 mcg Oral Q AM   midodrine 5 mg Oral TID   pantoprazole 40 mg Oral QAM   piperacillin-tazobactam 3.375 g Intravenous Q8H   sertraline 100 mg Oral Daily   vancomycin 1,250 mg Intravenous Q12H       dextrose 5 % and sodium chloride 0.45 % 100 mL/hr Last Rate: 100 mL/hr (08/13/17 0600)   Pharmacy to dose vancomycin     Diet Pureed; Nectar / Syrup Thick      Assessment/Plan   Assessment:     Active Hospital Problems (** Indicates Principal Problem)    Diagnosis Date Noted   • **Altered mental status [R41.82] 08/11/2017   • Metabolic encephalopathy [G93.41] 08/11/2017   • Hypoxemia [R09.02] 08/11/2017   • Metastatic disease [C80.1] 08/11/2017   • Breast cancer [C50.919] 08/11/2017   • Diabetes mellitus [E11.9] 08/11/2017   • DNR (do not resuscitate) [Z66] 08/11/2017   • Acute respiratory failure with hypoxia [J96.01] 05/01/2017   • Other pneumonia, unspecified organism [J18.8] 05/01/2017   • Chronic obstructive pulmonary disease [J44.9] 02/09/2016   • Gastroesophageal reflux disease with esophagitis [K21.0] 02/09/2016   • Hyperlipidemia [E78.5] 02/09/2016   • Hypothyroidism [E03.9] 02/09/2016      Resolved Hospital Problems    Diagnosis Date Noted Date Resolved   No resolved problems to display.       Plan:   Metabolic Encephalopathy  -likely multifactorial with possible PNA, hypoxic respiratory failure, metastatic cancer, etc.  -stable, hold off on brain imaging as this is unlikely to     Pneumonia  -on vanc/zosyn for now  -narrow abx based on respiatory culture and MRSA screen    COPD  -continue current treatment with scheduled  duo-nebs and inhaled LABA/steroid    Type 2 DM  -follow BG, no requirement for insulin    DVT Prophylaxis  -lovenox    Disposition  TBD.  Patient is appropriate for palliative care.  Palliative RN consulted, planning on meeting with POA. Patient is currently unable to participate in decision-making due to mental status.      Wilman Vega MD  [facility]  08/13/17  11:07 AM

## 2017-08-14 NOTE — PROGRESS NOTES
Continued Stay Note  [facility]     Patient Name: Bharti Roche  MRN: [id number]  Today's Date: 8/14/2017    Admit Date: 8/11/2017          Discharge Plan       08/14/17 1441    Case Management/Social Work Plan    Plan CCP to follow for discharge needs.  KARTHIKEYAN Browning    Additional Comments FACE SHEET VERIFIED.  Pt confused at present.  Called and spoke with pt's sister (Barrie Lyman  [phone]) per phone.  Pt is from [facility].  Called [facility] ([phone]) for level of care and bed hold status.  VM left awaiting return call.  Per pt's sister family have meeting scheduled regarding palliative care @ 12:30 on  8/15.   Sister states family would like to look at other options for Nursing Facilities.  Pt has Medicaid and family aware medicaid beds can be difficult to obtain.  CCP to follow for discharge needs.  Nallely RN              Discharge Codes     None            Latonya Reed, RN

## 2017-08-14 NOTE — PLAN OF CARE
Problem: Patient Care Overview (Adult)  Goal: Plan of Care Review  Outcome: Ongoing (interventions implemented as appropriate)    08/14/17 1424   Coping/Psychosocial Response Interventions   Plan Of Care Reviewed With patient   Patient Care Overview   Progress no change   Outcome Evaluation   Outcome Summary/Follow up Plan Pt alert to self, restless ativan ordered PRN, pt asking for cigarette, nicotine patch ordered. Awaiting pallative consult.        Goal: Discharge Needs Assessment  Outcome: Ongoing (interventions implemented as appropriate)    Problem: Tissue Perfusion, Ineffective Peripheral (Adult)  Goal: Adequate Tissue Perfusion  Outcome: Ongoing (interventions implemented as appropriate)    Problem: Fall Risk (Adult)  Goal: Absence of Falls  Outcome: Ongoing (interventions implemented as appropriate)    Problem: Pain, Acute (Adult)  Goal: Acceptable Pain Control/Comfort Level  Outcome: Ongoing (interventions implemented as appropriate)

## 2017-08-14 NOTE — PLAN OF CARE
Problem: Patient Care Overview (Adult)  Goal: Plan of Care Review  Outcome: Ongoing (interventions implemented as appropriate)    08/14/17 3385   Coping/Psychosocial Response Interventions   Plan Of Care Reviewed With patient   Patient Care Overview   Progress improving   Outcome Evaluation   Outcome Summary/Follow up Plan Pt was very confused but cooperative atimes, potassium replacement was completed and recheck after was 4.0meq, vss, no distress noticed, awaiting palliative meeting with family members, no distress noticed, I will continue to monitor.       Goal: Adult Individualization and Mutuality  Outcome: Ongoing (interventions implemented as appropriate)  Goal: Discharge Needs Assessment  Outcome: Ongoing (interventions implemented as appropriate)    Problem: Tissue Perfusion, Ineffective Peripheral (Adult)  Goal: Adequate Tissue Perfusion  Outcome: Ongoing (interventions implemented as appropriate)    Problem: Fall Risk (Adult)  Goal: Absence of Falls  Outcome: Ongoing (interventions implemented as appropriate)    Problem: Pain, Acute (Adult)  Goal: Acceptable Pain Control/Comfort Level  Outcome: Ongoing (interventions implemented as appropriate)

## 2017-08-14 NOTE — PROGRESS NOTES
Discharge Planning Assessment  [facility]     Patient Name: Bharti Roche  MRN: [id number]  Today's Date: 8/14/2017    Admit Date: 8/11/2017          Discharge Needs Assessment       08/14/17 1440    Living Environment    Able to Return to Prior Living Arrangements yes    Living Arrangement Comments Pt from [facility]    Discharge Needs Assessment    Equipment Currently Used at Home --   From [facility]    Discharge Disposition nursing facility      08/14/17 1424    Living Environment    Lives With other (see comments)   [facility]    Living Arrangements other (see comments)   [facility]    Home Accessibility no concerns    Stair Railings at Home none    Type of Financial/Environmental Concern none    Transportation Available car;family or friend will provide    Living Environment    Provides Primary Care For no one    Primary Care Provided By other (see comments)   Staff at [facility]    Quality Of Family Relationships supportive    Able to Return to Prior Living Arrangements yes            Discharge Plan       08/14/17 1441    Case Management/Social Work Plan    Plan CCP to follow for discharge needs.  KARTHIKEYAN Browning    Additional Comments FACE SHEET VERIFIED.  Pt confused at present.  Called and spoke with pt's sister (Barrie Lyman  [phone]) per phone.  Pt is from [facility].  Called [facility] ([phone]) for level of care and bed hold status.  VM left awaiting return call.  Per pt's sister family have meeting scheduled regarding palliative care @ 12:30 on  8/15.   Sister states family would like to look at other options for Nursing Facilities.  Pt has Medicaid and family aware medicaid beds can be difficult to obtain.  CCP to follow for discharge needs.  Nallely, RN        Discharge Placement     No information found                Demographic Summary       08/14/17 1421    Referral Information    Admission Type inpatient    Arrived From nursing facility    Primary Care  Physician Information    Name Dr. Catracho Bell            Functional Status       08/14/17 1426    Functional Status Current    Ambulation 2-->assistive person    Transferring 2-->assistive person    Toileting 2-->assistive person    Bathing 2-->assistive person    Dressing 2-->assistive person    Eating 2-->assistive person    Communication 2-->difficulty understanding and speaking (not related to language barrier)            Psychosocial     None            Abuse/Neglect     None            Legal     None            Substance Abuse     None            Patient Forms     None          Latonya Reed, RN

## 2017-08-14 NOTE — PROGRESS NOTES
[facility] INPATIENT PROGRESS NOTE  Subjective     CC: Metastatic breast carcinoma to bone, palliative care consultation    Interval history:            Bharti Roche is a 62 y.o. female who we are asked to see  in consultation for findings consistent with metastatic breast cancer.      The patient is a  62-year-old female transferred from a nursing facility with a history of chronic obstructive pulmonary disease, diabetes, breast cancer, apparent schizophrenia and previous pneumonia.  She is brought as result of progressive confusion but clearly short of breath and with reduced oxygen saturation.  She has advanced directive including a DNR.  Subsequent scans include a chest x-ray showing a potential mass in the left lung, question of interval development of left proximal humeral metastasis.  CT of the brain demonstrates minimal small vessel disease, 14 x 10 mm lucent area in the left superior parietal bone along the left superior vertex of the skull which is new compared to a scan 4 months ago and could well be a metastatic skull lesion.  A CT of chest shows evidence of splenomegaly though no lung mass except for right middle lobe nodule.  His acute interstitial disease and edema.  Further left femoral head demonstrates apparent metastatic disease and there is diffuse sclerotic bony metastasis involving multiple vertebral bodies and a subtle sternal fracture.  MRI of the brain is currently pending.            A further review the record it does find evidence of a consultation done April 2011 when the patient was found to have pleomorphic calcifications involving the lower inner quadrant of the left breast.  Stereotactic biopsy revealed microcalcifications, invasive ductal carcinoma, histologic grade 2 with no angiolymphatic invasion.  There was additional extensive DCIS with ER 90% WA at 70% HER-2 1+.  Additionally the DCIS was assessed with ER of 90%, WA 5-10%.  Surgically was recommended that she  undergo bilateral total mastectomy as well as a recommendation for her to undergo tamoxifen therapy.  There was a delay as result of pneumonia She evidently did proceed to surgery June 08, 2011 with postoperative complications with MRSA with follow-up note nearly 5 months later indicating right breast cancer with 1.3 cm invasive ductal carcinoma and 4 lymph nodes positive, ER/SC positive, HER-2 negative and left breast cancer invasive ductal carcinoma 1 cm, grade 2, ER/SC positive HER-2 negative with 1 of 2 lymph nodes positive.    When seen back September 21, 2011 plans were made for her to start Arimidex.  She evidently continue this medication with additional hospitalization in January 2014 when she had post influenza pneumonia.  She is reviewed August 2014 with negative scans.  She had additional issues with further recurrent aspiration pneumonia in December 2014 and again August 2015 at which time DO NOT RESUSCITATE order was placed.  She had a similar episode January 2016 and was admitted April 6 to April 20, 2017 with repeat respiratory failure from influenza and additional complications with Escherichia coli urinary tract infection, and acute metabolic encephalopathy.    The patient was seen in consultation August 13, 2017 with radiologic findings of diffuse bony metastatic disease discovered as result of her ongoing assessments.  She is not felt a candidate for additional therapy other than altering her aromatase inhibitor treatment which has occurred.  In discussions with family members palliative care was recommended and ongoing consultation is proceeding.            Medications:  The current medication list was reviewed in the EMR.    Allergies:  No Known Allergies    Objective      Vitals:    08/14/17 1641   BP: 127/78   Pulse: 116   Resp:    Temp:    SpO2: 94%        Physical exam:   GENERAL:  Well-developed, well-nourished in no acute distress. Patient is confused, and he tried to pull down her close.        RECENT LABS:      Results from last 7 days  Lab Units 08/13/17  0438 08/12/17  0537 08/11/17  1728   WBC 10*3/mm3 10.33 10.90* 9.88   HEMOGLOBIN g/dL 7.5* 7.6* 8.9*   HEMATOCRIT % 26.8* 27.4* 29.7*   PLATELETS 10*3/mm3 346 353 359   MONOCYTES % %  --   --  8.0       Results from last 7 days  Lab Units 08/14/17  0204 08/13/17  0438 08/12/17  0537 08/11/17  1728   SODIUM mmol/L  --  142 146* 144   POTASSIUM mmol/L 4.0 2.9* 3.5 3.4*   CHLORIDE mmol/L  --  100 102 98   CO2 mmol/L  --  31.3* 31.5* 29.9*   BUN mg/dL  --  21 26* 27*   CREATININE mg/dL  --  0.73 0.49* 0.63   CALCIUM mg/dL  --  8.7 8.6 8.9   BILIRUBIN mg/dL  --   --   --  0.2   ALK PHOS U/L  --   --   --  198*   ALT (SGPT) U/L  --   --   --  5   AST (SGOT) U/L  --   --   --  18   GLUCOSE mg/dL  --  87 85 97           Assessment/Plan          This is a 62 y.o. female with:significant past medical history including paranoid schizophrenia, diabetes mellitus, hyperlipidemia, chronic obstructive pulmonary disease and bilateral breast carcinomas in 2011 with pathology including the right breast cancer with 1.3 cm invasive ductal carcinoma and 4 lymph nodes positive, ER/NH positive, HER-2 negative and left breast cancer invasive ductal carcinoma 1 cm, grade 2, ER/NH positive HER-2 negative with 1 of 2 lymph nodes positive.  As result of her concurrent medical disorders she was not treated with chemotherapy, not a candidate for such therapy, and instead has been treated with anti-hormonal therapy for 6 years using Arimidex.       She is now admitted with mental status and found to have radiologic evidence of diffuse bony metastatic disease.  My colleague Dr. Gallardo had met with the patient and one of her sisters on 8/13/2017 and find that the patient is not capable of participating in decisions about her medical care.  She has a POA with another sister who was not present at this time.  Her current relative, however, is well acquainted with the care of her  sibling.      I am in consensus was Dr. Gallardo, this patient is a candidate for palliative care/hospice care because of her metastatic disease and other comorbidities.    Her family member is waiting to discuss with palliative care service.     I will sign off for now.      Andrés Baez MD PhD  8/14/2017  5:12 PM

## 2017-08-14 NOTE — PROGRESS NOTES
Name: Bharti Roche ADMIT: 2017   : 1955  PCP: Catracho Bell MD    MRN: [id number] LOS: 3 days   AGE/SEX: 62 y.o. female  ROOM: [facility]   Subjective   Subjective  CC/reason for follow-up: confusion  No acute events.  Patient remains confused, agitated this AM improved with PO ativan.   Asking for a cigarette. Patient has no specific complaints.    Objective   Vital Signs  Temp:  [98.1 °F (36.7 °C)-98.6 °F (37 °C)] 98.6 °F (37 °C)  Heart Rate:  [] 118  Resp:  [16-20] 16  BP: ()/(64-76) 120/75  SpO2:  [90 %-95 %] 93 %  on  Flow (L/min):  [3-4] 4;   O2 Device: nasal cannula  Body mass index is 26.52 kg/(m^2).     Physical Exam  General:  Alert, no distress, appears chronically ill  Head: NCAT  Eyes: EOMI, conjunctivae normal  Mouth/Throat: oropharynx clear and moist  Neck: supple, nml ROM  Cardiac: normal rate, regular rhythm  Respiratory: normal effort, decreased breath sounds at the bases  Abdomen: soft, non-tender,bowel sounds normoactive  Musculoskeletal: no deformity, no tenderness, no dependent edema  Extremities: no cyanosis, warm, peripheral pulses intact  Skin: no rashes, warm/dry  Neuro: oriented x1, confused, does not follow commands  Psych: mood and affect appropriate  Results Review:       I reviewed the patient's new clinical results.    Results from last 7 days  Lab Units 17  0438 1737 17  1728   WBC 10*3/mm3 10.33 10.90* 9.88   HEMOGLOBIN g/dL 7.5* 7.6* 8.9*   PLATELETS 10*3/mm3 346 353 359       Results from last 7 days  Lab Units 17  0204 17  0438 17  0537 17  1728   SODIUM mmol/L  --  142 146* 144   POTASSIUM mmol/L 4.0 2.9* 3.5 3.4*   CHLORIDE mmol/L  --  100 102 98   CO2 mmol/L  --  31.3* 31.5* 29.9*   BUN mg/dL  --  21 26* 27*   CREATININE mg/dL  --  0.73 0.49* 0.63   GLUCOSE mg/dL  --  87 85 97   Estimated Creatinine Clearance: 84.6 mL/min (by C-G formula based on Cr of 0.73).    Results from last 7 days  Lab Units  08/13/17  0438 08/12/17  0537 08/11/17  1728   CALCIUM mg/dL 8.7 8.6 8.9   ALBUMIN g/dL  --  2.0* 2.90*         amoxicillin-clavulanate 1 tablet Oral Q12H   arformoterol 15 mcg Nebulization BID - RT   aspirin 81 mg Oral Daily   atorvastatin 40 mg Oral Daily   benztropine 1 mg Oral BID   budesonide 0.5 mg Nebulization BID   enoxaparin 30 mg Subcutaneous Daily   ipratropium-albuterol 3 mL Nebulization 4x Daily - RT   letrozole 2.5 mg Oral Daily   levothyroxine 112 mcg Oral Q AM   midodrine 5 mg Oral TID   nicotine 1 patch Transdermal Q24H   pantoprazole 40 mg Oral QAM   sertraline 100 mg Oral Daily       dextrose 5 % and sodium chloride 0.45 % 100 mL/hr Last Rate: 100 mL/hr (08/13/17 0600)   Diet Pureed; Nectar / Syrup Thick      Assessment/Plan   Assessment:     Active Hospital Problems (** Indicates Principal Problem)    Diagnosis Date Noted   • **Altered mental status [R41.82] 08/11/2017   • Metabolic encephalopathy [G93.41] 08/11/2017   • Hypoxemia [R09.02] 08/11/2017   • Metastatic disease [C80.1] 08/11/2017   • Breast cancer [C50.919] 08/11/2017   • Diabetes mellitus [E11.9] 08/11/2017   • DNR (do not resuscitate) [Z66] 08/11/2017   • Acute respiratory failure with hypoxia [J96.01] 05/01/2017   • Other pneumonia, unspecified organism [J18.8] 05/01/2017   • Chronic obstructive pulmonary disease [J44.9] 02/09/2016   • Gastroesophageal reflux disease with esophagitis [K21.0] 02/09/2016   • Hyperlipidemia [E78.5] 02/09/2016   • Hypothyroidism [E03.9] 02/09/2016      Resolved Hospital Problems    Diagnosis Date Noted Date Resolved   No resolved problems to display.       Plan:   Metabolic Encephalopathy  -likely multifactorial with possible PNA, hypoxic respiratory failure, metastatic cancer, etc.; also has underlying schizophrenia   -stable, hold off on brain imaging as this is unlikely to     Pneumonia  -abx switched to augmentin to complete 7d course    COPD  -continue current treatment with  scheduled duo-nebs and inhaled LABA/steroid    Type 2 DM  -follow BG, no requirement for insulin    DVT Prophylaxis  -lovenox    Disposition  TBD.  Patient is appropriate for palliative care.  Palliative RN consulted, planning on meeting with family today.  Patient is currently unable to participate in decision-making due to mental status.      Wilman Vega MD  [facility]  08/14/17  3:39 PM

## 2017-08-14 NOTE — PROGRESS NOTES
PROGRESS NOTE   LOS: 3 days   Patient Care Team:  Catracho Bell MD as PCP - General (Pulmonary Disease)    Chief Complaint:altered mental status,history of breast cancer with new evidence of bone metastases    Interval History: Patient was admitted on 8/11/17 with altered mental status.  Per family she had been having frequent falls due to low blood pressure and hypoxemia with O2 sats in the 80s.  Chest x-ray showed possible left lower lobe lung mass and bilateral perihilar atelectasis.  CT chest  showed acute interstitial changes suggestive of edema versus inflammatory or infectious change.  She was noted to have a right upper lobe 6 mm nodule.  There was also an area on left humeral head suggestive of metastatic disease on plain film, but was not well visualized on CT scan.  She also had a CT of the head that showed a lesion on the left parietal bone suggestive of myeloma that is or metastatic lesion since it is new from 4/6/17.  Pro-calcitonin and white blood cell count were within normal limits, that antibiotics were started and pared gently due to patient's underlying condition.  Urine tox screen was negative  On August 13, 2017 the patient was seen by oncology and after reviewing the records and discussion with the family the recommendations were to modify her regimen to Femara and consider brief radiation treatment, but may consider palliative care as the most appropriate step at this point.  The family seems to be receptive to the idea and a meeting with the palliative team is being arranged.    Meanwhile patient is compensating on nasal cannula oxygen was no sign of distress, however she is confused and will pull off oxygen at times.      REVIEW OF SYSTEMS:   CARDIOVASCULAR: No chest pain, chest pressure or chest discomfort. No palpitations or edema.   RESPIRATORY:  shortness of breath.  Cough.  No sputum production  Gastrointestinal: No anorexia, nausea, vomiting or diarrhea. No abdominal pain or blood.  "  HEMATOLOGIC: No bleeding or bruising.   Denies any pain     Ventilator/Non-Invasive Ventilation Settings     None          Vital Signs  Temp:  [98.1 °F (36.7 °C)] 98.1 °F (36.7 °C)  Heart Rate:  [] 114  Resp:  [16-20] 16  BP: ()/(64-76) 104/70  SpO2:  [90 %-95 %] 93 %  on  Flow (L/min):  [3-4] 4 O2 Device: nasal cannula    Intake/Output Summary (Last 24 hours) at 08/14/17 1058  Last data filed at 08/14/17 1009   Gross per 24 hour   Intake              840 ml   Output                0 ml   Net              840 ml     Flowsheet Rows         First Filed Value    Admission Height  66\" (167.6 cm) Documented at 08/11/2017 1642    Admission Weight  220 lb (99.8 kg) Documented at 08/11/2017 1642        Last 3 weights    08/12/17  0602 08/13/17  1900 08/14/17  0500   Weight: 164 lb 1.6 oz (74.4 kg) 168 lb (76.2 kg) 164 lb 4.8 oz (74.5 kg)       Physical Exam:  GEN:  No acute distress, Awake, cooperative, Oriented ×1, able to follow simple commands,well developed, on oxygen per nasal cannula    EYES:   Sclera clear. No icterus. PERRL.   ENT:   External ears/nose normal, no oral lesions, no thrush, mucous membranes moist  NECK:  Supple, midline trachea, no JVD  LUNGS: Normal chest on inspection, diminished breath sounds worse in the bases no wheezes. No rhonchi.Positive crackles posteriorly bilaterally over the bases. Respirations regular, even and unlabored.   CV:  Regular rhythm and tachycardic. Normal S1/S2. No murmurs, gallops, or rubs noted.  ABD:  Soft, non-tender and non-distended. Normal bowel sounds. No guarding  EXT:  Moves all extremities well. No cyanosis. No redness. No edema.   Skin: dry, intact, no bleeding    Results Review:        Results from last 7 days  Lab Units 08/14/17  0204 08/13/17  0438 08/12/17  0537 08/11/17  1728   SODIUM mmol/L  --  142 146* 144   POTASSIUM mmol/L 4.0 2.9* 3.5 3.4*   CHLORIDE mmol/L  --  100 102 98   CO2 mmol/L  --  31.3* 31.5* 29.9*   BUN mg/dL  --  21 26* 27* "   CREATININE mg/dL  --  0.73 0.49* 0.63   CALCIUM mg/dL  --  8.7 8.6 8.9   AST (SGOT) U/L  --   --   --  18   ALT (SGPT) U/L  --   --   --  5   ANION GAP mmol/L  --  10.7 12.5 16.1   ALBUMIN g/dL  --   --   --  2.90*       Results from last 7 days  Lab Units 08/11/17  1728   TROPONIN T ng/mL <0.010       Results from last 7 days  Lab Units 08/13/17  0438 08/12/17  0537 08/11/17  1728   WBC 10*3/mm3 10.33 10.90* 9.88   HEMOGLOBIN g/dL 7.5* 7.6* 8.9*   HEMATOCRIT % 26.8* 27.4* 29.7*   PLATELETS 10*3/mm3 346 353 359   NEUTROPHIL % % 49.9 55.2  --    LYMPHOCYTE % % 26.8 25.4  --    MONOCYTES % % 16.5* 15.3*  --    EOSINOPHIL % % 3.0 1.4  --    BASOPHIL % % 0.9 0.5  --    IMM GRAN % % 2.9* 2.2*  --                    Results from last 7 days  Lab Units 08/11/17  2330 08/11/17  1721   PH, ARTERIAL pH units 7.468* 7.480*   PO2 ART mm Hg 59.2* 57.8*   PCO2, ARTERIAL mm Hg 48.7* 40.5   HCO3 ART mmol/L 35.3* 30.1*         No results found for: POCGLU    Results from last 7 days  Lab Units 08/11/17  1728   PROCALCITONIN ng/mL 0.08*   LACTATE mmol/L 1.0       Results from last 7 days  Lab Units 08/11/17  1728   PROBNP pg/mL 120.9       Results from last 7 days  Lab Units 08/11/17  1832 08/11/17  1728   BLOODCX  No growth at 2 days No growth at 2 days   URINECX   --  No growth       Results from last 7 days  Lab Units 08/11/17  1728   NITRITE UA  Positive*   WBC UA /HPF 0-2   BACTERIA UA /HPF None Seen   SQUAM EPITHEL UA /HPF 3-6*   URINECX  No growth               Imaging Results (all)     Procedure Component Value Units Date/Time    XR Chest 1 View [[id number]] Collected:  08/11/17 1717     Updated:  08/11/17 1723    Narrative:       EMERGENCY PORTABLE CHEST SINGLE VIEW     HISTORY: Obese 62-year-old female presents to the ER for evaluation of  confusion. HISTORY includes COPD, breast cancer and fever.     COMPARISON: 4/10/2017 and 4/18/2017     FINDINGS:  1. Question new mass left lung inferiorly measuring 29 mm,  otherwise  stable negative acute chest.  2. Previous right breast surgery and right axillary dissection.  3. Chronic left perihilar scarring and atelectasis.  4. Limited imaging due to body habitus, low lung volumes and portable  technique.  5. Question of interval development of left proximal humeral metastasis     Recommend CT follow-up for further evaluation of left lower lobe mass,  and proximal left humeral metastasis.     Bone scan follow-up may also be useful..     This report was finalized on 8/11/2017 5:20 PM by Dr. Marin Chery MD.       CT Chest Without Contrast [[id number]] Collected:  08/11/17 1812     Updated:  08/11/17 1839    Addenda:        CORRECTION: due to computer voice recognition error:     HISTORY: 62-YEAR-OLD obese female...     ADDENDUM:     FINDINGS:  4. Left humeral head demonstrated apparent metastatic disease on plain  film imaging, this area is not well seen on CT but they are abnormal  suggesting metastatic disease.  6. Diffuse sclerotic bony metastasis involving multiple vertebral  bodies.  7. Somewhat subtle sternal fracture only appreciated on the parasagittal  images.  8.     This report was finalized on 8/11/2017 6:36 PM by Dr. Marin Chery MD.     Signed:  08/11/17 1836 by Marin Chery MD    Narrative:       EMERGENCY NONCONTRAST CT CHEST     HISTORY: 8-year-old obese female presenting to the ER for evaluation of  confusion. HISTORY includes COPD breast cancer and fever question mass  left lower lobe on plain film imaging of the chest, CT follow-up was  requested.     COMPARISON: Correlation is made with a chest x-ray of 08/11/2017 earlier  today and 04/18/2017     FINDINGS:  1. Limited imaging due to body habitus, motion artifact and the absence  of IV contrast.  2. Prominent splenomegaly.  3. Previous cholecystectomy, cardiomegaly.  4. Left humeral head demonstrated apparent metastatic disease involving  is area is not well seen on CT, the humeral head and proximal  humerus  are abnormal on CT.  5. Acute bilateral interstitial disease, bibasilar atelectasis without  discrete left lung mass.  6. Coarse chronic calcification in the lingula.       Impression:       1. Acute interstitial disease hiatus and edema versus infectious or  inflammatory change.  2. Splenomegaly.  3. No left lung mass nor active airspace.  4. Right middle lobe nodule measuring 6 mm image #34     Recommend referral to [facility].     This report was finalized on 8/11/2017 6:16 PM by Dr. Marin Chery MD.       CT Head Without Contrast [[id number]] Collected:  08/11/17 1903     Updated:  08/11/17 2050    Narrative:       EMERGENCY NONCONTRAST HEAD CT 08/11/2017     CLINICAL HISTORY: Altered mental status with increasing agitation,  confusion and anemia.     TECHNIQUE: Spiral CT images were obtained from the base of the skull to  the vertex without intravenous contrast and images were reformatted and  submitted in 3 mm thick axial CT section with brain algorithm. Due to  the motion artifact repeat spiral CT images were obtained of the base of  the skull to the vertex without intravenous contrast and these images  were also reformatted and submitted in 3 mm thick axial CT section with  brain algorithm.     COMPARISON: This is correlated to prior noncontrast head CT from [facility] on 04/06/2017.     FINDINGS: Both initial head CT and the repeat head CT images are  degraded by motion artifact that slightly limits evaluation. There is  only minimal low-density in the periventricular white matter consistent  with minimal small vessel disease and the remainder of the brain  parenchyma is normal in attenuation. The ventricles are normal in size  and I see no mass effect and no midline shift and no extra-axial fluid  collections are identified and there is no evidence of acute  intracranial hemorrhage. There is a 14 x 10 mm ovoid lytic area in the  superior  left parietal bone along the left side of the vertex of the  skull, some adjacent soft tissue thickening in the scalp and this  finding is new when compared to a prior head CT 4 months ago, on  04/18/2017, suggesting that its a lytic lesion from myeloma or  metastatic disease.       Impression:          1. This exam is motion degraded. This limits evaluation even despite  repeat imaging through the head. There is minimal small vessel disease  in the cerebral white matter. There is a 14 x 10 mm ovoid lucent area in  the left superior parietal bone along the left superior vertex of the  skull with adjacent soft tissue thickening of the scalp and this finding  is new when compared to head CT 4 months ago on 04/06/2017 and the fact  that it is new over the last 4 months suggests that it is either a  myelomatous or a metastatic skull lesion. Correlate with physical exam  and a contrast-enhanced MRI of the head could be obtained to further  characterize. In reviewing today's chest CT there are multiple lytic and  blastic lesions in the spine that are new when compared to a chest CT  06/26/2016 indicating new osseous metastatic disease to the bony thorax  and the patient has an enlarged spleen that is new when compared to  01/26/2016 suggesting either myelofibrosis or myeloproliferative  disorder and the findings must be correlated clinically. The remainder  of the head CT is unremarkable.     The results were communicated to Dr. Solares in the emergency room by  telephone on 08/11/2017 at 6:20 PM.     This report was finalized on 8/11/2017 8:47 PM by Dr. Angel Siu MD.                   Medication Review:     amoxicillin-clavulanate 1 tablet Oral Q12H   arformoterol 15 mcg Nebulization BID - RT   aspirin 81 mg Oral Daily   atorvastatin 40 mg Oral Daily   benztropine 1 mg Oral BID   budesonide 0.5 mg Nebulization BID   enoxaparin 30 mg Subcutaneous Daily   ipratropium-albuterol 3 mL Nebulization 4x Daily - RT   letrozole  2.5 mg Oral Daily   levothyroxine 112 mcg Oral Q AM   midodrine 5 mg Oral TID   nicotine 1 patch Transdermal Q24H   pantoprazole 40 mg Oral QAM   sertraline 100 mg Oral Daily         dextrose 5 % and sodium chloride 0.45 % 100 mL/hr Last Rate: 100 mL/hr (08/13/17 0600)       ASSESSMENT:   1. Altered mental status with TME   2. Acute on chronic hypoxemic respiratory failure  3. COPD  4. Interstitial changes on CT suggestive of edema versus infection/inflammation.  5. Metastatic disease to the bone most likely from her breast cancer  6. History of right breast cancer status post surgery with lymph node removal  7. Anemia  8. Hypokalemia-improved  9. Mild hypernatremia  10. Diabetes mellitus  11. Abnormal urinary analysis with negative urine culture  12. History of MRSA    PLAN:  Continue Augmentin for total 7 days course, however consider stopping all antibiotic if the patient and family decided to go for palliative care  Continue brovana and Pulmicort as substitute for Symbicort  Continue oxygen and wean to keep sats greater than 88%    Will follow prn given the plan to go palliative, please call in case of any changes in the plan  Disposition:  per admitting.      Chantal Saleh, KORY  08/14/17  10:58 AM      EMR Dragon/Transcription disclaimer:   Much of this encounter note is an electronic transcription/translation of spoken language to printed text. The electronic translation of spoken language may permit erroneous, or at times, nonsensical words or phrases to be inadvertently transcribed; Although I have reviewed the note for such errors, some may still exist.

## 2017-08-15 PROBLEM — Z51.5 PALLIATIVE CARE PATIENT: Status: ACTIVE | Noted: 2017-01-01

## 2017-08-15 NOTE — PROGRESS NOTES
Name: Bharti Roche ADMIT: 2017   : 1955  PCP: Catracho Bell MD    MRN: [id number] LOS: 4 days   AGE/SEX: 62 y.o. female  ROOM: [facility]   Subjective   Subjective  CC/reason for follow-up: confusion  No acute events.  Patient much more calm today, denies pain and dyspnea.  Palliative RN met with family and POA, who decided on palliative/hospice care.    Objective   Vital Signs  Temp:  [98.9 °F (37.2 °C)-100.9 °F (38.3 °C)] 100.9 °F (38.3 °C)  Heart Rate:  [] 106  Resp:  [16-32] 22  BP: ()/(57-75) 95/63  SpO2:  [90 %-98 %] 91 %  on  Flow (L/min):  [4-4.5] 4;   O2 Device: nasal cannula  Body mass index is 26.76 kg/(m^2).     Physical Exam  General:  Alert, no distress, appears chronically ill  Head: NCAT  Eyes: EOMI, conjunctivae normal  Mouth/Throat: oropharynx clear and moist  Neck: supple, nml ROM  Cardiac: normal rate, regular rhythm  Respiratory: normal effort, decreased breath sounds at the bases  Abdomen: soft, non-tender,bowel sounds normoactive  Musculoskeletal: no deformity, no tenderness, no dependent edema  Extremities: no cyanosis, warm, peripheral pulses intact  Skin: no rashes, warm/dry  Neuro: oriented x1, confused, does not follow commands  Psych: mood and affect appropriate  Results Review:       I reviewed the patient's new clinical results.    Results from last 7 days  Lab Units 17  0438 17  0537 17  1728   WBC 10*3/mm3 10.33 10.90* 9.88   HEMOGLOBIN g/dL 7.5* 7.6* 8.9*   PLATELETS 10*3/mm3 346 353 359       Results from last 7 days  Lab Units 17  0204 17  0438 17  0537 17  1728   SODIUM mmol/L  --  142 146* 144   POTASSIUM mmol/L 4.0 2.9* 3.5 3.4*   CHLORIDE mmol/L  --  100 102 98   CO2 mmol/L  --  31.3* 31.5* 29.9*   BUN mg/dL  --  21 26* 27*   CREATININE mg/dL  --  0.73 0.49* 0.63   GLUCOSE mg/dL  --  87 85 97   Estimated Creatinine Clearance: 85 mL/min (by C-G formula based on Cr of 0.73).    Results from last 7 days  Lab  Units 08/13/17  0438 08/12/17  0537 08/11/17  1728   CALCIUM mg/dL 8.7 8.6 8.9   ALBUMIN g/dL  --  2.0* 2.90*         ipratropium-albuterol 3 mL Nebulization 4x Daily - RT   nicotine 1 patch Transdermal Q24H   pantoprazole 40 mg Oral QAM      Diet Soft Texture; Ground; Nectar / Syrup Thick      Assessment/Plan   Assessment:     Active Hospital Problems (** Indicates Principal Problem)    Diagnosis Date Noted   • **Altered mental status [R41.82] 08/11/2017   • Palliative care patient [Z51.5] 08/15/2017   • Metabolic encephalopathy [G93.41] 08/11/2017   • Hypoxemia [R09.02] 08/11/2017   • Metastatic disease [C80.1] 08/11/2017   • Breast cancer [C50.919] 08/11/2017   • Diabetes mellitus [E11.9] 08/11/2017   • DNR (do not resuscitate) [Z66] 08/11/2017   • Acute respiratory failure with hypoxia [J96.01] 05/01/2017   • Other pneumonia, unspecified organism [J18.8] 05/01/2017   • Chronic obstructive pulmonary disease [J44.9] 02/09/2016   • Gastroesophageal reflux disease with esophagitis [K21.0] 02/09/2016   • Hyperlipidemia [E78.5] 02/09/2016   • Hypothyroidism [E03.9] 02/09/2016      Resolved Hospital Problems    Diagnosis Date Noted Date Resolved   No resolved problems to display.       Plan:   Metabolic Encephalopathy  -likely multifactorial with possible PNA, hypoxic respiratory failure, metastatic cancer, etc.; also has underlying schizophrenia     Pneumonia  -abx switched to augmentin; discontinue due to comfort care status    COPD  -duo-nebs for comfort    DVT Prophylaxis  -lovenox    Will move to [facility] for palliative care.  Consult [facility].    Disposition  4 [facility] with [facility] consult.  Patient is currently unable to participate in decision-making due to mental status.      Wilman Vega MD  [facility]  08/15/17  5:18 PM

## 2017-08-15 NOTE — CONSULTS
Purpose of the visit was to evaluate for: goals of care/advanced care planning, support for patient/family, hospice referral/discussion, pain/symptom management, transfer to comfort care bed/unit and comfort care. Spoke with RN as well as family and POA and discussed palliative care, goals of care, care options, [facility] and [facility] scattered bed status.      Assessment:  Patient is palliative care appropriate for inpatient care given metastatic breast cancer to multiple bone sites, possible PNA. She has history of schizophrenia and COPD. She is presently a DNR. Her sister is her POA and she would like for her to move to Palliative Care for full Comfort Care.     Recommendations/Plan: transfer to [facility] for palliative care. Have [facility] referral after transfer, family is unable to take her home.  They do not want her to go back to the nursing home she was at before. Family would like for her to be able to eat whatever she wants instead of a pureed diet she was on, understanding that she may choke on it.     Other Comments: Call placed to Dr. Vega about transfer to Palliative Care for Full Comfort.     Thank you for the referral.

## 2017-08-15 NOTE — PLAN OF CARE
Problem: Patient Care Overview (Adult)  Goal: Plan of Care Review  Outcome: Ongoing (interventions implemented as appropriate)    08/15/17 6390   Coping/Psychosocial Response Interventions   Plan Of Care Reviewed With patient   Patient Care Overview   Progress progress toward functional goals as expected   Outcome Evaluation   Outcome Summary/Follow up Plan Pt. confused and disoriented, no complaints of pain, anxious and throwing pillows, gave ativan SL x1, no IV in place yet, called the IV team. Continue to monitor for pain and symptom managment.         Problem: Fall Risk (Adult)  Goal: Absence of Falls  Outcome: Ongoing (interventions implemented as appropriate)    Problem: Pain, Acute (Adult)  Goal: Acceptable Pain Control/Comfort Level  Outcome: Ongoing (interventions implemented as appropriate)    Problem: Dying Patient, Actively (Adult)  Goal: Identify Related Risk Factors and Signs and Symptoms  Outcome: Ongoing (interventions implemented as appropriate)  Goal: Comfort/Pain Control  Outcome: Ongoing (interventions implemented as appropriate)  Goal: Dying Process, Peace and Dignity  Outcome: Ongoing (interventions implemented as appropriate)

## 2017-08-15 NOTE — PLAN OF CARE
Problem: Patient Care Overview (Adult)  Goal: Plan of Care Review  Outcome: Ongoing (interventions implemented as appropriate)    08/15/17 0215   Coping/Psychosocial Response Interventions   Plan Of Care Reviewed With patient   Patient Care Overview   Progress improving   Outcome Evaluation   Outcome Summary/Follow up Plan Pt was still confused, medicated as ordered & tolerating well, sinus Tach. on the monitor, vss,awaiting meeting with palliative & family today, I will continue to monitor.       Goal: Adult Individualization and Mutuality  Outcome: Ongoing (interventions implemented as appropriate)  Goal: Discharge Needs Assessment  Outcome: Ongoing (interventions implemented as appropriate)    Problem: Tissue Perfusion, Ineffective Peripheral (Adult)  Goal: Adequate Tissue Perfusion  Outcome: Ongoing (interventions implemented as appropriate)    Problem: Fall Risk (Adult)  Goal: Absence of Falls  Outcome: Ongoing (interventions implemented as appropriate)    Problem: Pain, Acute (Adult)  Goal: Acceptable Pain Control/Comfort Level  Outcome: Ongoing (interventions implemented as appropriate)

## 2017-08-16 NOTE — PLAN OF CARE
Problem: Patient Care Overview (Adult)  Goal: Plan of Care Review  Outcome: Ongoing (interventions implemented as appropriate)  Continue symptom management and comfort care    08/15/17 1750 08/16/17 0622   Coping/Psychosocial Response Interventions   Plan Of Care Reviewed With patient --    Patient Care Overview   Progress progress toward functional goals as expected --    Outcome Evaluation   Outcome Summary/Follow up Plan --  Patient rested comfortable all night. No complaints of pain or anxiety. Monitored closely for S&S of withdrawls from Psych meds.       Goal: Adult Individualization and Mutuality  Outcome: Ongoing (interventions implemented as appropriate)  Goal: Discharge Needs Assessment  Outcome: Ongoing (interventions implemented as appropriate)    Problem: Pain, Acute (Adult)  Goal: Acceptable Pain Control/Comfort Level  Outcome: Ongoing (interventions implemented as appropriate)    Problem: Dying Patient, Actively (Adult)  Goal: Identify Related Risk Factors and Signs and Symptoms  Outcome: Ongoing (interventions implemented as appropriate)  Goal: Comfort/Pain Control  Outcome: Ongoing (interventions implemented as appropriate)  Goal: Dying Process, Peace and Dignity  Outcome: Ongoing (interventions implemented as appropriate)

## 2017-08-16 NOTE — PROGRESS NOTES
Name: Bharti Roche ADMIT: 2017   : 1955  PCP: Catracho Bell MD    MRN: [id number] LOS: 5 days   AGE/SEX: 62 y.o. female  ROOM: [facility]   Subjective   Subjective  CC/reason for follow-up: confusion  No acute events.  Patient slept through the night.  Required 2 doses of IV ativan and 2 doses of IV dilaudid.  No complaints today.    Objective   Vital Signs  Temp:  [100.3 °F (37.9 °C)-100.9 °F (38.3 °C)] 100.9 °F (38.3 °C)  Heart Rate:  [] 94  Resp:  [16-28] 16  BP: ()/(63-72) 104/72  SpO2:  [90 %-92 %] 92 %  on  Flow (L/min):  [4] 4;   O2 Device: nasal cannula  Body mass index is 26.76 kg/(m^2).     Physical Exam  General:  Alert, no distress, appears chronically ill  Head: NCAT  Eyes: EOMI, conjunctivae normal  Mouth/Throat: oropharynx clear and moist  Neck: supple, nml ROM  Cardiac: normal rate, regular rhythm  Respiratory: normal effort, decreased breath sounds at the bases  Abdomen: soft, non-tender,bowel sounds normoactive  Musculoskeletal: no deformity, no tenderness, no dependent edema  Extremities: no cyanosis, warm, peripheral pulses intact  Skin: no rashes, warm/dry  Neuro: oriented x1, confused, does not follow commands  Psych: mood and affect appropriate  Results Review:       I reviewed the patient's new clinical results.    Results from last 7 days  Lab Units 17  0438 17  0537 17  1728   WBC 10*3/mm3 10.33 10.90* 9.88   HEMOGLOBIN g/dL 7.5* 7.6* 8.9*   PLATELETS 10*3/mm3 346 353 359       Results from last 7 days  Lab Units 17  0204 17  0438 17  0537 17  1728   SODIUM mmol/L  --  142 146* 144   POTASSIUM mmol/L 4.0 2.9* 3.5 3.4*   CHLORIDE mmol/L  --  100 102 98   CO2 mmol/L  --  31.3* 31.5* 29.9*   BUN mg/dL  --  21 26* 27*   CREATININE mg/dL  --  0.73 0.49* 0.63   GLUCOSE mg/dL  --  87 85 97   Estimated Creatinine Clearance: 85 mL/min (by C-G formula based on Cr of 0.73).    Results from last 7 days  Lab Units 17  7955  08/12/17  0537 08/11/17  1728   CALCIUM mg/dL 8.7 8.6 8.9   ALBUMIN g/dL  --  2.0* 2.90*         ipratropium-albuterol 3 mL Nebulization 4x Daily - RT   nicotine 1 patch Transdermal Q24H   pantoprazole 40 mg Oral QAM      Diet Soft Texture; Ground; Nectar / Syrup Thick      Assessment/Plan   Assessment:     Active Hospital Problems (** Indicates Principal Problem)    Diagnosis Date Noted   • **Altered mental status [R41.82] 08/11/2017   • Palliative care patient [Z51.5] 08/15/2017   • Metabolic encephalopathy [G93.41] 08/11/2017   • Hypoxemia [R09.02] 08/11/2017   • Metastatic disease [C80.1] 08/11/2017   • Breast cancer [C50.919] 08/11/2017   • Diabetes mellitus [E11.9] 08/11/2017   • DNR (do not resuscitate) [Z66] 08/11/2017   • Acute respiratory failure with hypoxia [J96.01] 05/01/2017   • Other pneumonia, unspecified organism [J18.8] 05/01/2017   • Chronic obstructive pulmonary disease [J44.9] 02/09/2016   • Gastroesophageal reflux disease with esophagitis [K21.0] 02/09/2016   • Hyperlipidemia [E78.5] 02/09/2016   • Hypothyroidism [E03.9] 02/09/2016      Resolved Hospital Problems    Diagnosis Date Noted Date Resolved   No resolved problems to display.       Plan:   Continue with palliative care.  [facility] consulted, will plan dispo after their evaluation.  Based on palliative performance index has days to weeks.    D/w RN    Disposition  TBD.      Wilman Vega MD  [facility]  08/16/17  10:22 AM

## 2017-08-16 NOTE — PLAN OF CARE
Problem: Patient Care Overview (Adult)  Goal: Plan of Care Review  Outcome: Ongoing (interventions implemented as appropriate)    08/16/17 0778   Coping/Psychosocial Response Interventions   Plan Of Care Reviewed With patient   Patient Care Overview   Progress declining   Outcome Evaluation   Outcome Summary/Follow up Plan patient premedicated prior to turns, this afternoon additional medication was needed prior to turns due to vomiting. family at bedside and met with [facility] this afternoon. will continue to monitor patient and treat with comfort measures.          Problem: Pain, Acute (Adult)  Goal: Acceptable Pain Control/Comfort Level  Outcome: Ongoing (interventions implemented as appropriate)    Problem: Dying Patient, Actively (Adult)  Goal: Identify Related Risk Factors and Signs and Symptoms  Outcome: Outcome(s) achieved Date Met:  08/16/17  Goal: Comfort/Pain Control  Outcome: Ongoing (interventions implemented as appropriate)  Goal: Dying Process, Peace and Dignity  Outcome: Ongoing (interventions implemented as appropriate)

## 2017-08-16 NOTE — DISCHARGE SUMMARY
Date of Admission: 8/11/2017  Date of Discharge:  8/16/2017  Primary Care Physician: Catracho Bell MD     Discharge Diagnosis:  Active Hospital Problems (** Indicates Principal Problem)    Diagnosis Date Noted   • **Altered mental status [R41.82] 08/11/2017   • Palliative care patient [Z51.5] 08/15/2017   • Metabolic encephalopathy [G93.41] 08/11/2017   • Hypoxemia [R09.02] 08/11/2017   • Metastatic disease [C80.1] 08/11/2017   • Breast cancer [C50.919] 08/11/2017   • Diabetes mellitus [E11.9] 08/11/2017   • DNR (do not resuscitate) [Z66] 08/11/2017   • Acute respiratory failure with hypoxia [J96.01] 05/01/2017   • Other pneumonia, unspecified organism [J18.8] 05/01/2017   • Chronic obstructive pulmonary disease [J44.9] 02/09/2016   • Gastroesophageal reflux disease with esophagitis [K21.0] 02/09/2016   • Hyperlipidemia [E78.5] 02/09/2016   • Hypothyroidism [E03.9] 02/09/2016      Resolved Hospital Problems    Diagnosis Date Noted Date Resolved   No resolved problems to display.       Presenting Problem/History of Present Illness:  Metabolic encephalopathy [G93.41]  Hypoxemia [R09.02]  Metastatic disease [C80.1]  Altered mental status, unspecified altered mental status type [R41.82]     Hospital Course:  The patient is a 62 y.o. female who presented with altered mental status.  She had a history of metastatic breast cancer, COPD, paranoid schizophrenia, and dysphagia.  Please see H&P from 8/11/17 for further details. On admission, she was in acute respiratory failure with hypoxia with respiratory distress and oxygen sats in the 80s on room air.  She was found to have pneumonia and was started on broad spectrum antibiotics.  Pulmonology and oncology were consulted.  She had had a history of breast cancer with evidence of diffuse bony metastasis.  It was determined that due to her advanced stage and her acute illness she was no longer a candidate for chemotherapy.  Palliative care was consulted and the patient's  "family decided to move toward comfort measures only.  She was transferred to a palliative care floor and evaluated by [facility].  She was subsequently transferred to hospice for ongoing comfort care.    Exam Today:  Blood pressure 104/72, pulse 94, temperature (!) 100.9 °F (38.3 °C), temperature source Oral, resp. rate 16, height 66\" (167.6 cm), weight 165 lb 12.8 oz (75.2 kg), SpO2 92 %, not currently breastfeeding.  General:  Alert, no distress, appears chronically ill  Head: NCAT  Eyes: EOMI, conjunctivae normal  Mouth/Throat: oropharynx clear and moist  Neck: supple, nml ROM  Cardiac: normal rate, regular rhythm  Respiratory: normal effort, decreased breath sounds at the bases  Abdomen: soft, non-tender,bowel sounds normoactive  Musculoskeletal: no deformity, no tenderness, no dependent edema  Extremities: no cyanosis, warm, peripheral pulses intact  Skin: no rashes, warm/dry  Neuro: oriented x1, confused, does not follow commands  Psych: mood and affect appropriate    Procedures Performed:         Consults:   Consults     Date and Time Order Name Status Description    8/12/2017 1050 Inpatient Consult to Hematology & Oncology Completed     8/11/2017 2342 Inpatient Consult to Pulmonology Completed     8/11/2017 1959 [facility] (on-call MD unless specified) Completed            Discharge Disposition:  Hospice/Medical Facility ([facility])    Discharge Medications:   Bharti Roche   Home Medication Instructions NEHA:[id number]    Printed on:08/16/17 1351   Medication Information                      albuterol (PROVENTIL HFA;VENTOLIN HFA) 108 (90 BASE) MCG/ACT inhaler  Ventolin  (90 Base) MCG/ACT Inhalation Aerosol Solution; Patient Sig: Ventolin  (90 Base) MCG/ACT Inhalation Aerosol Solution INHALE 1 TO 2 PUFFS EVERY 4 TO 6 HOURS AS NEEDED.; 2; 5; 13-Aug-2012; Active             alendronate (FOSAMAX) 10 MG tablet  TAKE THREE TABLETS BY MOUTH EVERY WEDNESDAY ON EMPTY STOMACH; MUST SIT UP RIGHT " FOR 30 MINUTES             anastrozole (ARIMIDEX) 1 MG tablet  TAKE 1 TABLET BY MOUTH DAILY             aspirin 81 MG chewable tablet  CHEW ONE TABLET BY MOUTH DAILY             atorvastatin (LIPITOR) 40 MG tablet  TAKE 1 TABLET BY MOUTH DAILY AS DIRECTED FOR HIGH CHOLESTEROL             benztropine (COGENTIN) 1 MG tablet  Take 1 mg by mouth 2 (Two) Times a Day.             budesonide (PULMICORT) 0.25 MG/2ML nebulizer solution  Inhale 0.5 mg 2 (Two) Times a Day. 0.5 mg/2 ml             budesonide-formoterol (SYMBICORT) 160-4.5 MCG/ACT inhaler  Symbicort 160-4.5 MCG/ACT Inhalation Aerosol; Patient Sig: Symbicort 160-4.5 MCG/ACT Inhalation Aerosol ; 10; 0; 19-Aug-2014; Active             Calcium Carbonate 1500 (600 CA) MG tablet  TAKE 1 TABLET BY MOUTH DAILY             cholecalciferol (VITAMIN D3) 1000 UNITS tablet  TAKE 1 TABLET BY MOUTH DAILY             cloZAPine (CLOZARIL) 100 MG tablet  Take 1 tablet by mouth Every Night.             divalproex (DEPAKOTE) 500 MG EC tablet  Take 1 tablet by mouth. Take one tablet by mouth every morning and two tablets by mouth every evening             docusate sodium (COLACE) 100 MG capsule  TAKE 1 CAPSULE BY MOUTH TWICE A DAY AS NEEDED             ferrous sulfate 325 (65 FE) MG tablet  Take 325 mg by mouth 3 (Three) Times a Day With Meals.             levothyroxine (SYNTHROID, LEVOTHROID) 112 MCG tablet  TAKE ONE TABLET BY MOUTH DAILY             midodrine (PROAMATINE) 5 MG tablet  Take 5 mg by mouth 3 (Three) Times a Day.             nicotine (NICODERM CQ) 21 MG/24HR patch  Place 1 patch on the skin every day. Do not smoke with patch, call before running out for decreasing dose             nicotine (NICODERM CQ) 21 MG/24HR patch  Place 1 patch on the skin Daily. For tobacco cessation             omeprazole (priLOSEC) 20 MG capsule  Take 20 mg by mouth Daily.             pantoprazole (PROTONIX) 40 MG EC tablet  Take 1 tablet by mouth Daily.             risperiDONE (RisperDAL) 1  MG tablet  Take 2 tablets by mouth nightly.             sertraline (ZOLOFT) 100 MG tablet  Take  by mouth Daily.                 Discharge Diet: Regular as tolerated    Activity at Discharge: as tolerated    Follow-up Appointments:  No future appointments.      Test Results Pending at Discharge:   Order Current Status    Blood Culture Preliminary result    Blood Culture Preliminary result           Wilman Vega MD  08/16/17  1:51 PM    Time Spent on Discharge Activities: Greater than 30 minutes.

## 2017-08-17 NOTE — DISCHARGE SUMMARY
Primary Care Physician: Catracho Bell MD    Date of Death:  8/17/2017  Time of Death:  10:10AM    Cause of Death: Pneumonia    Final Diagnosis:  Active Hospital Problems (** Indicates Principal Problem)    Diagnosis Date Noted   • **Altered mental status [R41.82] 08/11/2017   • Palliative care patient [Z51.5] 08/15/2017   • Metabolic encephalopathy [G93.41] 08/11/2017   • Hypoxemia [R09.02] 08/11/2017   • Metastatic disease [C80.1] 08/11/2017   • Breast cancer [C50.919] 08/11/2017   • Diabetes mellitus [E11.9] 08/11/2017   • DNR (do not resuscitate) [Z66] 08/11/2017   • Acute respiratory failure with hypoxia [J96.01] 05/01/2017   • Other pneumonia, unspecified organism [J18.8] 05/01/2017   • Chronic obstructive pulmonary disease [J44.9] 02/09/2016   • Gastroesophageal reflux disease with esophagitis [K21.0] 02/09/2016   • Hyperlipidemia [E78.5] 02/09/2016   • Hypothyroidism [E03.9] 02/09/2016      Resolved Hospital Problems    Diagnosis Date Noted Date Resolved   No resolved problems to display.       Presenting Problem/History of Present Illness:  Metabolic encephalopathy [G93.41]  Hypoxemia [R09.02]  Metastatic disease [C80.1]  Altered mental status, unspecified altered mental status type [R41.82]      Hospital Course  The patient is a 62 y.o. female who presented with altered mental status.  She had a history of metastatic breast cancer, COPD, paranoid schizophrenia, and dysphagia.  Please see H&P from 8/11/17 for further details. On admission, she was in acute respiratory failure with hypoxia with respiratory distress and oxygen sats in the 80s on room air.  She was found to have pneumonia and was started on broad spectrum antibiotics.  Pulmonology and oncology were consulted.  She had had a history of breast cancer with evidence of diffuse bony metastasis.  It was determined that due to her advanced stage and her acute illness she was no longer a candidate for chemotherapy.  Palliative care was consulted and  the patient's family decided to move toward comfort measures only.  She was transferred to a palliative care floor and evaluated by [facility].  She was subsequently transferred to hospice for ongoing comfort care.  She rapidly declined and  the following day at 10:10AM.       Wilman Vega MD  17  10:42 AM

## 2017-08-17 NOTE — PROGRESS NOTES
Continued Stay Note  [facility]     Patient Name: Bharti Roche  MRN: [id number]  Today's Date: 2017    Admit Date: 2017          Discharge Plan       17 1155    Final Note    Final Note Pt .   KARTHIKEYAN Browning              Discharge Codes       17 1155    Discharge Codes    Discharge Codes 20          Expected Discharge Date and Time     Expected Discharge Date Expected Discharge Time    Aug 17, 2017             Latonya Reed RN

## 2017-08-17 NOTE — H&P
Name: Bharti Roche ADMIT: 2017   : 1955  PCP: Catracho Bell MD    MRN: [id number] LOS: 1 days   AGE/SEX: 62 y.o. female  ROOM: [facility]     No chief complaint on file.      Subjective   The patient is a 62 y.o. female who presented with altered mental status.  She had a history of metastatic breast cancer, COPD, paranoid schizophrenia, and dysphagia.  Please see H&P from 17 for further details. On admission, she was in acute respiratory failure with hypoxia with respiratory distress and oxygen sats in the 80s on room air.  She was found to have pneumonia and was started on broad spectrum antibiotics.  Pulmonology and oncology were consulted.  She had had a history of breast cancer with evidence of diffuse bony metastasis.  It was determined that due to her advanced stage and her acute illness she was no longer a candidate for chemotherapy.  Palliative care was consulted and the patient's family decided to move toward comfort measures only.  She was transferred to a palliative care floor and evaluated by [facility].  She was subsequently transferred to hospice for ongoing comfort care.    History of Present Illness    Past Medical History:   Diagnosis Date   • Abdominal pain    • Abscess of chest wall    • Acute bronchitis with bronchospasm    • Anemia 2016   • Asthma    • Breast cancer     Breast cancer, ER/MS positive   • Chronic bronchitis    • Cryptogenic organizing pneumonia    • Depression    • Diabetes mellitus    • Esophageal reflux    • High blood cholesterol level     reported cholesterol level was high   • Influenza    • Osteoporosis    • Paranoid schizophrenia    • Pneumonia      Past Surgical History:   Procedure Laterality Date   • MASTECTOMY RADICAL Bilateral     Breast cancer   • OTHER SURGICAL HISTORY      breats surgery mastectomy     Family History   Problem Relation Age of Onset   • Cancer Mother      non-Hodgkin's lymphoma   • Prostate cancer Father    • Lung cancer  Father    • Esophageal cancer Brother      Social History   Substance Use Topics   • Smoking status: Current Every Day Smoker     Packs/day: 2.00     Types: Cigarettes   • Smokeless tobacco: Not on file      Comment: starting smoking again   • Alcohol use No     Prescriptions Prior to Admission   Medication Sig Dispense Refill Last Dose   • albuterol (PROVENTIL HFA;VENTOLIN HFA) 108 (90 BASE) MCG/ACT inhaler Ventolin  (90 Base) MCG/ACT Inhalation Aerosol Solution; Patient Sig: Ventolin  (90 Base) MCG/ACT Inhalation Aerosol Solution INHALE 1 TO 2 PUFFS EVERY 4 TO 6 HOURS AS NEEDED.; 2; 5; 13-Aug-2012; Active   Taking   • alendronate (FOSAMAX) 10 MG tablet TAKE THREE TABLETS BY MOUTH EVERY WEDNESDAY ON EMPTY STOMACH; MUST SIT UP RIGHT FOR 30 MINUTES 12 tablet 3    • anastrozole (ARIMIDEX) 1 MG tablet TAKE 1 TABLET BY MOUTH DAILY 30 tablet 2 Taking   • aspirin 81 MG chewable tablet CHEW ONE TABLET BY MOUTH DAILY 30 tablet 6 Taking   • atorvastatin (LIPITOR) 40 MG tablet TAKE 1 TABLET BY MOUTH DAILY AS DIRECTED FOR HIGH CHOLESTEROL 90 tablet 1 Taking   • benztropine (COGENTIN) 1 MG tablet Take 1 mg by mouth 2 (Two) Times a Day.   Taking   • budesonide (PULMICORT) 0.25 MG/2ML nebulizer solution Inhale 0.5 mg 2 (Two) Times a Day. 0.5 mg/2 ml      • budesonide-formoterol (SYMBICORT) 160-4.5 MCG/ACT inhaler Symbicort 160-4.5 MCG/ACT Inhalation Aerosol; Patient Sig: Symbicort 160-4.5 MCG/ACT Inhalation Aerosol ; 10; 0; 19-Aug-2014; Active   Taking   • Calcium Carbonate 1500 (600 CA) MG tablet TAKE 1 TABLET BY MOUTH DAILY 30 tablet 1 Not Taking   • cholecalciferol (VITAMIN D3) 1000 UNITS tablet TAKE 1 TABLET BY MOUTH DAILY 28 tablet 3    • cloZAPine (CLOZARIL) 100 MG tablet Take 1 tablet by mouth Every Night. 30 tablet 0 Taking   • divalproex (DEPAKOTE) 500 MG EC tablet Take 1 tablet by mouth. Take one tablet by mouth every morning and two tablets by mouth every evening   Taking   • docusate sodium (COLACE) 100  MG capsule TAKE 1 CAPSULE BY MOUTH TWICE A DAY AS NEEDED 60 capsule 1 Taking   • ferrous sulfate 325 (65 FE) MG tablet Take 325 mg by mouth 3 (Three) Times a Day With Meals.      • levothyroxine (SYNTHROID, LEVOTHROID) 112 MCG tablet TAKE ONE TABLET BY MOUTH DAILY 30 tablet 5 Taking   • midodrine (PROAMATINE) 5 MG tablet Take 5 mg by mouth 3 (Three) Times a Day.      • nicotine (NICODERM CQ) 21 MG/24HR patch Place 1 patch on the skin every day. Do not smoke with patch, call before running out for decreasing dose 45 patch 0 Not Taking   • nicotine (NICODERM CQ) 21 MG/24HR patch Place 1 patch on the skin Daily. For tobacco cessation 30 patch 1    • omeprazole (priLOSEC) 20 MG capsule Take 20 mg by mouth Daily.      • pantoprazole (PROTONIX) 40 MG EC tablet Take 1 tablet by mouth Daily. 30 tablet 3 Taking   • risperiDONE (RisperDAL) 1 MG tablet Take 2 tablets by mouth nightly.   Taking   • sertraline (ZOLOFT) 100 MG tablet Take  by mouth Daily.   Taking     Allergies:  Review of patient's allergies indicates no known allergies.    Review of Systems   Unable to perform ROS: Mental status change        Objective    Vital Signs  Temp:  [101.2 °F (38.4 °C)-105.8 °F (41 °C)] 105.4 °F (40.8 °C)  Heart Rate:  [142-154] 154  Resp:  [28-40] 28  BP: ()/(50-74) 78/50  SpO2:  [54 %-67 %] 67 %  on  Flow (L/min):  [4] 4;   O2 Device: nasal cannula  There is no height or weight on file to calculate BMI.    Physical Exam  General:  somnolent, no distress, appears chronically ill  Head: NCAT  Eyes: EOMI, conjunctivae normal  Mouth/Throat: oropharynx clear and moist  Neck: supple, nml ROM  Cardiac: normal rate, regular rhythm  Respiratory: normal effort, decreased breath sounds at the bases  Abdomen: soft, non-tender,bowel sounds normoactive  Musculoskeletal: no deformity, no tenderness, no dependent edema  Extremities: no cyanosis, warm, peripheral pulses intact  Skin: no rashes, warm/dry  Neuro: somnolent, does not follow  commands  Psych: mood and affect appropriate  Results Review:   I reviewed the patient's new clinical results.    Results from last 7 days  Lab Units 08/13/17  0438 08/12/17  0537 08/11/17  1728   WBC 10*3/mm3 10.33 10.90* 9.88   HEMOGLOBIN g/dL 7.5* 7.6* 8.9*   PLATELETS 10*3/mm3 346 353 359       Results from last 7 days  Lab Units 08/14/17  0204 08/13/17  0438 08/12/17  0537 08/11/17  1728   SODIUM mmol/L  --  142 146* 144   POTASSIUM mmol/L 4.0 2.9* 3.5 3.4*   CHLORIDE mmol/L  --  100 102 98   CO2 mmol/L  --  31.3* 31.5* 29.9*   BUN mg/dL  --  21 26* 27*   CREATININE mg/dL  --  0.73 0.49* 0.63   GLUCOSE mg/dL  --  87 85 97   ALBUMIN g/dL  --   --  2.0* 2.90*   BILIRUBIN mg/dL  --   --   --  0.2   ALK PHOS U/L  --   --   --  198*   AST (SGOT) U/L  --   --   --  18   ALT (SGPT) U/L  --   --   --  5   Estimated Creatinine Clearance: 85 mL/min (by C-G formula based on Cr of 0.73).    Results from last 7 days  Lab Units 08/11/17  1728   TROPONIN T ng/mL <0.010   PROBNP pg/mL 120.9         Results from last 7 days  Lab Units 08/11/17  1728   NITRITE UA  Positive*   WBC UA /HPF 0-2   BACTERIA UA /HPF None Seen   SQUAM EPITHEL UA /HPF 3-6*   URINECX  No growth       No orders to display     Assessment/Plan   Assessment:     Active Hospital Problems (** Indicates Principal Problem)    Diagnosis Date Noted   • Altered mental status [R41.82] 08/11/2017      Resolved Hospital Problems    Diagnosis Date Noted Date Resolved   No resolved problems to display.         Plan:   Continue with palliative care.  [facility] consulted, she will be transferred ot a hospice scattered bed.         I discussed the patients findings and my recommendations with patient, family and nursing staff.          Wilman Vega MD  [facility]  08/17/17  10:45 AM

## 2017-08-17 NOTE — H&P
Name: Bharti Roche ADMIT: 2017   : 1955  PCP: Catracho Bell MD    MRN: [id number] LOS: 5 days   AGE/SEX: 62 y.o. female  ROOM: [facility]     Chief Complaint   Patient presents with   • Altered Mental Status   • Anemia       Subjective   The patient is a 62 y.o. female who presented with altered mental status.  She had a history of metastatic breast cancer, COPD, paranoid schizophrenia, and dysphagia.  Please see H&P from 17 for further details. On admission, she was in acute respiratory failure with hypoxia with respiratory distress and oxygen sats in the 80s on room air.  She was found to have pneumonia and was started on broad spectrum antibiotics.  Pulmonology and oncology were consulted.  She had had a history of breast cancer with evidence of diffuse bony metastasis.  It was determined that due to her advanced stage and her acute illness she was no longer a candidate for chemotherapy.  Palliative care was consulted and the patient's family decided to move toward comfort measures only.  She was transferred to a palliative care floor and evaluated by [facility].  She was subsequently transferred to hospice for ongoing comfort care.    History of Present Illness    Past Medical History:   Diagnosis Date   • Abdominal pain    • Abscess of chest wall    • Acute bronchitis with bronchospasm    • Anemia 2016   • Asthma    • Breast cancer     Breast cancer, ER/SD positive   • Chronic bronchitis    • Cryptogenic organizing pneumonia    • Depression    • Diabetes mellitus    • Esophageal reflux    • High blood cholesterol level     reported cholesterol level was high   • Influenza    • Osteoporosis    • Paranoid schizophrenia    • Pneumonia      Past Surgical History:   Procedure Laterality Date   • MASTECTOMY RADICAL Bilateral     Breast cancer   • OTHER SURGICAL HISTORY      breats surgery mastectomy     Family History   Problem Relation Age of Onset   • Cancer Mother      non-Hodgkin's  lymphoma   • Prostate cancer Father    • Lung cancer Father    • Esophageal cancer Brother      Social History   Substance Use Topics   • Smoking status: Current Every Day Smoker     Packs/day: 2.00     Types: Cigarettes   • Smokeless tobacco: None      Comment: starting smoking again   • Alcohol use No     Prescriptions Prior to Admission   Medication Sig Dispense Refill Last Dose   • albuterol (PROVENTIL HFA;VENTOLIN HFA) 108 (90 BASE) MCG/ACT inhaler Ventolin  (90 Base) MCG/ACT Inhalation Aerosol Solution; Patient Sig: Ventolin  (90 Base) MCG/ACT Inhalation Aerosol Solution INHALE 1 TO 2 PUFFS EVERY 4 TO 6 HOURS AS NEEDED.; 2; 5; 13-Aug-2012; Active   Taking   • alendronate (FOSAMAX) 10 MG tablet TAKE THREE TABLETS BY MOUTH EVERY WEDNESDAY ON EMPTY STOMACH; MUST SIT UP RIGHT FOR 30 MINUTES 12 tablet 3    • anastrozole (ARIMIDEX) 1 MG tablet TAKE 1 TABLET BY MOUTH DAILY 30 tablet 2 Taking   • aspirin 81 MG chewable tablet CHEW ONE TABLET BY MOUTH DAILY 30 tablet 6 Taking   • atorvastatin (LIPITOR) 40 MG tablet TAKE 1 TABLET BY MOUTH DAILY AS DIRECTED FOR HIGH CHOLESTEROL 90 tablet 1 Taking   • benztropine (COGENTIN) 1 MG tablet Take 1 mg by mouth 2 (Two) Times a Day.   Taking   • budesonide (PULMICORT) 0.25 MG/2ML nebulizer solution Inhale 0.5 mg 2 (Two) Times a Day. 0.5 mg/2 ml      • budesonide-formoterol (SYMBICORT) 160-4.5 MCG/ACT inhaler Symbicort 160-4.5 MCG/ACT Inhalation Aerosol; Patient Sig: Symbicort 160-4.5 MCG/ACT Inhalation Aerosol ; 10; 0; 19-Aug-2014; Active   Taking   • Calcium Carbonate 1500 (600 CA) MG tablet TAKE 1 TABLET BY MOUTH DAILY 30 tablet 1 Not Taking   • cholecalciferol (VITAMIN D3) 1000 UNITS tablet TAKE 1 TABLET BY MOUTH DAILY 28 tablet 3    • cloZAPine (CLOZARIL) 100 MG tablet Take 1 tablet by mouth Every Night. 30 tablet 0 Taking   • divalproex (DEPAKOTE) 500 MG EC tablet Take 1 tablet by mouth. Take one tablet by mouth every morning and two tablets by mouth every  evening   Taking   • docusate sodium (COLACE) 100 MG capsule TAKE 1 CAPSULE BY MOUTH TWICE A DAY AS NEEDED 60 capsule 1 Taking   • ferrous sulfate 325 (65 FE) MG tablet Take 325 mg by mouth 3 (Three) Times a Day With Meals.      • levothyroxine (SYNTHROID, LEVOTHROID) 112 MCG tablet TAKE ONE TABLET BY MOUTH DAILY 30 tablet 5 Taking   • midodrine (PROAMATINE) 5 MG tablet Take 5 mg by mouth 3 (Three) Times a Day.      • nicotine (NICODERM CQ) 21 MG/24HR patch Place 1 patch on the skin every day. Do not smoke with patch, call before running out for decreasing dose 45 patch 0 Not Taking   • nicotine (NICODERM CQ) 21 MG/24HR patch Place 1 patch on the skin Daily. For tobacco cessation 30 patch 1    • omeprazole (priLOSEC) 20 MG capsule Take 20 mg by mouth Daily.      • pantoprazole (PROTONIX) 40 MG EC tablet Take 1 tablet by mouth Daily. 30 tablet 3 Taking   • risperiDONE (RisperDAL) 1 MG tablet Take 2 tablets by mouth nightly.   Taking   • sertraline (ZOLOFT) 100 MG tablet Take  by mouth Daily.   Taking     Allergies:  Review of patient's allergies indicates no known allergies.    Review of Systems   Unable to perform ROS: Mental status change        Objective    Vital Signs  Temp:  [101.2 °F (38.4 °C)-105.8 °F (41 °C)] 105.4 °F (40.8 °C)  Heart Rate:  [142-154] 154  Resp:  [28-40] 28  BP: ()/(50-74) 78/50  SpO2:  [54 %-67 %] 67 %  on  Flow (L/min):  [4] 4;   O2 Device: nasal cannula  Body mass index is 26.76 kg/(m^2).    Physical Exam  General:  somnolent, no distress, appears chronically ill  Head: NCAT  Eyes: EOMI, conjunctivae normal  Mouth/Throat: oropharynx clear and moist  Neck: supple, nml ROM  Cardiac: normal rate, regular rhythm  Respiratory: normal effort, decreased breath sounds at the bases  Abdomen: soft, non-tender,bowel sounds normoactive  Musculoskeletal: no deformity, no tenderness, no dependent edema  Extremities: no cyanosis, warm, peripheral pulses intact  Skin: no rashes, warm/dry  Neuro:  somnolent, does not follow commands  Psych: mood and affect appropriate  Results Review:   I reviewed the patient's new clinical results.    Results from last 7 days  Lab Units 08/13/17  0438 08/12/17  0537 08/11/17  1728   WBC 10*3/mm3 10.33 10.90* 9.88   HEMOGLOBIN g/dL 7.5* 7.6* 8.9*   PLATELETS 10*3/mm3 346 353 359     Results from last 7 days  Lab Units 08/14/17  0204 08/13/17  0438 08/12/17  0537 08/11/17  1728   SODIUM mmol/L  --  142 146* 144   POTASSIUM mmol/L 4.0 2.9* 3.5 3.4*   CHLORIDE mmol/L  --  100 102 98   CO2 mmol/L  --  31.3* 31.5* 29.9*   BUN mg/dL  --  21 26* 27*   CREATININE mg/dL  --  0.73 0.49* 0.63   GLUCOSE mg/dL  --  87 85 97   ALBUMIN g/dL  --   --  2.0* 2.90*   BILIRUBIN mg/dL  --   --   --  0.2   ALK PHOS U/L  --   --   --  198*   AST (SGOT) U/L  --   --   --  18   ALT (SGPT) U/L  --   --   --  5   Estimated Creatinine Clearance: 85 mL/min (by C-G formula based on Cr of 0.73).  Results from last 7 days  Lab Units 08/11/17  1728   TROPONIN T ng/mL <0.010   PROBNP pg/mL 120.9       Results from last 7 days  Lab Units 08/11/17  1728   NITRITE UA  Positive*   WBC UA /HPF 0-2   BACTERIA UA /HPF None Seen   SQUAM EPITHEL UA /HPF 3-6*   URINECX  No growth       CT Head Without Contrast   Final Result       1. This exam is motion degraded. This limits evaluation even despite   repeat imaging through the head. There is minimal small vessel disease   in the cerebral white matter. There is a 14 x 10 mm ovoid lucent area in   the left superior parietal bone along the left superior vertex of the   skull with adjacent soft tissue thickening of the scalp and this finding   is new when compared to head CT 4 months ago on 04/06/2017 and the fact   that it is new over the last 4 months suggests that it is either a   myelomatous or a metastatic skull lesion. Correlate with physical exam   and a contrast-enhanced MRI of the head could be obtained to further   characterize. In reviewing today's chest CT  there are multiple lytic and   blastic lesions in the spine that are new when compared to a chest CT   06/26/2016 indicating new osseous metastatic disease to the bony thorax   and the patient has an enlarged spleen that is new when compared to   01/26/2016 suggesting either myelofibrosis or myeloproliferative   disorder and the findings must be correlated clinically. The remainder   of the head CT is unremarkable.       The results were communicated to Dr. Solares in the emergency room by   telephone on 08/11/2017 at 6:20 PM.       This report was finalized on 8/11/2017 8:47 PM by Dr. Angel Siu MD.          CT Chest Without Contrast   Final Result   Addendum 1 of 1   CORRECTION: due to computer voice recognition error:       HISTORY: 62-YEAR-OLD obese female...       ADDENDUM:       FINDINGS:   4. Left humeral head demonstrated apparent metastatic disease on plain   film imaging, this area is not well seen on CT but they are abnormal   suggesting metastatic disease.   6. Diffuse sclerotic bony metastasis involving multiple vertebral   bodies.   7. Somewhat subtle sternal fracture only appreciated on the parasagittal   images.   8.       This report was finalized on 8/11/2017 6:36 PM by Dr. Marin Chery MD.          Final   1. Acute interstitial disease hiatus and edema versus infectious or   inflammatory change.   2. Splenomegaly.   3. No left lung mass nor active airspace.   4. Right middle lobe nodule measuring 6 mm image #34       Recommend referral to [facility].       This report was finalized on 8/11/2017 6:16 PM by Dr. Marin Chery MD.          XR Chest 1 View   Final Result        Assessment/Plan   Assessment:     Active Hospital Problems (** Indicates Principal Problem)    Diagnosis Date Noted   • **Altered mental status [R41.82] 08/11/2017   • Palliative care patient [Z51.5] 08/15/2017   • Metabolic encephalopathy [G93.41] 08/11/2017   • Hypoxemia [R09.02]  08/11/2017   • Metastatic disease [C80.1] 08/11/2017   • Breast cancer [C50.919] 08/11/2017   • Diabetes mellitus [E11.9] 08/11/2017   • DNR (do not resuscitate) [Z66] 08/11/2017   • Acute respiratory failure with hypoxia [J96.01] 05/01/2017   • Other pneumonia, unspecified organism [J18.8] 05/01/2017   • Chronic obstructive pulmonary disease [J44.9] 02/09/2016   • Gastroesophageal reflux disease with esophagitis [K21.0] 02/09/2016   • Hyperlipidemia [E78.5] 02/09/2016   • Hypothyroidism [E03.9] 02/09/2016      Resolved Hospital Problems    Diagnosis Date Noted Date Resolved   No resolved problems to display.         Plan:   Continue with palliative care.  [facility] consulted, she will be transferred ot a hospice scattered bed.         I discussed the patients findings and my recommendations with patient, family and nursing staff.          Wilman Vega MD  [facility]  08/17/17  10:39 AM

## 2017-08-17 NOTE — PROGRESS NOTES
Continued Stay Note  [facility]     Patient Name: Bharti Roche  MRN: [id number]  Today's Date: 8/17/2017    Admit Date: 8/16/2017          Discharge Plan       08/17/17 0850    Case Management/Social Work Plan    Plan [facility] to follow.  KARTHIKEYAN Browning    Additional Comments Per [facility] on 8/16 awaiting family to sign consent forms for [facility].  [facility] to follow.  KARTHIKEYAN Browning              Discharge Codes     None            Latonya Reed RN

## 2017-08-17 NOTE — CONSULTS
Spiritual Care Consult, Family Request:   was paged to visit family and contact .  Spiritual Assessment reveals that pt is [place] and has family support.  Pt's sister was bedside at time of pastoral visit.   offered prayer and empathic listening for pt's sister.  Per sister's request,  contacted  for [facility].   expected to arrive shortly.   notified pt's sister that  is on way.  Pt's sister was receptive of pastoral support and appreciative of prayer and referral to .  Follow-up if family requests.    Cedrick Winter M.Div.  Chaplain Resident

## 2017-08-17 NOTE — PLAN OF CARE
Problem: Patient Care Overview (Adult)  Goal: Plan of Care Review  Outcome: Ongoing (interventions implemented as appropriate)  Continue symptom management and comfort care.    08/17/17 0547   Coping/Psychosocial Response Interventions   Plan Of Care Reviewed With durable power of ;family   Patient Care Overview   Progress declining   Outcome Evaluation   Outcome Summary/Follow up Plan Patient declining repidly. Large amount of family at bedside. Clergy has been by. Extremely congested. Suction at bedside and Robinul Q2 hour per family request. Patient is unresponsive and appears to be in no pain.       Goal: Adult Individualization and Mutuality  Outcome: Ongoing (interventions implemented as appropriate)  Goal: Discharge Needs Assessment  Outcome: Ongoing (interventions implemented as appropriate)    Problem: Pain, Acute (Adult)  Goal: Acceptable Pain Control/Comfort Level  Outcome: Ongoing (interventions implemented as appropriate)    Problem: Dying Patient, Actively (Adult)  Goal: Comfort/Pain Control  Outcome: Ongoing (interventions implemented as appropriate)  Goal: Dying Process, Peace and Dignity  Outcome: Ongoing (interventions implemented as appropriate)

## 2019-12-17 NOTE — PLAN OF CARE
Problem: Patient Care Overview (Adult)  Goal: Plan of Care Review  Outcome: Ongoing (interventions implemented as appropriate)    04/13/17 1420   Coping/Psychosocial Response Interventions   Plan Of Care Reviewed With patient   Patient Care Overview   Progress progress towards functional goals is fair   Outcome Evaluation   Outcome Summary/Follow up Plan Pt with cognition issues that impact progress with safety and I            None